# Patient Record
Sex: MALE | Race: WHITE | NOT HISPANIC OR LATINO | Employment: OTHER | URBAN - METROPOLITAN AREA
[De-identification: names, ages, dates, MRNs, and addresses within clinical notes are randomized per-mention and may not be internally consistent; named-entity substitution may affect disease eponyms.]

---

## 2019-12-29 ENCOUNTER — PATIENT MESSAGE (OUTPATIENT)
Dept: FAMILY MEDICINE | Facility: CLINIC | Age: 79
End: 2019-12-29

## 2019-12-29 DIAGNOSIS — I25.10 CORONARY ARTERY DISEASE INVOLVING NATIVE CORONARY ARTERY OF NATIVE HEART WITHOUT ANGINA PECTORIS: ICD-10-CM

## 2019-12-29 DIAGNOSIS — Z79.899 ENCOUNTER FOR LONG-TERM (CURRENT) USE OF OTHER MEDICATIONS: Primary | ICD-10-CM

## 2019-12-29 DIAGNOSIS — E78.49 OTHER HYPERLIPIDEMIA: ICD-10-CM

## 2020-01-07 LAB
ALBUMIN SERPL-MCNC: 4 G/DL (ref 3.6–5.1)
ALBUMIN/GLOB SERPL: 1.5 (CALC) (ref 1–2.5)
ALP SERPL-CCNC: 53 U/L (ref 40–115)
ALT SERPL-CCNC: 27 U/L (ref 9–46)
APPEARANCE UR: CLEAR
AST SERPL-CCNC: 32 U/L (ref 10–35)
BACTERIA #/AREA URNS HPF: NORMAL /HPF
BACTERIA UR CULT: NORMAL
BASOPHILS # BLD AUTO: 60 CELLS/UL (ref 0–200)
BASOPHILS NFR BLD AUTO: 0.9 %
BILIRUB SERPL-MCNC: 1.2 MG/DL (ref 0.2–1.2)
BILIRUB UR QL STRIP: NEGATIVE
BUN SERPL-MCNC: 17 MG/DL (ref 7–25)
BUN/CREAT SERPL: NORMAL (CALC) (ref 6–22)
CALCIUM SERPL-MCNC: 9.5 MG/DL (ref 8.6–10.3)
CHLORIDE SERPL-SCNC: 107 MMOL/L (ref 98–110)
CHOLEST SERPL-MCNC: 118 MG/DL
CHOLEST/HDLC SERPL: 2.8 (CALC)
CO2 SERPL-SCNC: 28 MMOL/L (ref 20–32)
COLOR UR: YELLOW
CREAT SERPL-MCNC: 1.06 MG/DL (ref 0.7–1.11)
EOSINOPHIL # BLD AUTO: 261 CELLS/UL (ref 15–500)
EOSINOPHIL NFR BLD AUTO: 3.9 %
ERYTHROCYTE [DISTWIDTH] IN BLOOD BY AUTOMATED COUNT: 12.5 % (ref 11–15)
GFRSERPLBLD MDRD-ARVRAT: 65 ML/MIN/1.73M2
GLOBULIN SER CALC-MCNC: 2.7 G/DL (CALC) (ref 1.9–3.7)
GLUCOSE SERPL-MCNC: 85 MG/DL (ref 65–99)
GLUCOSE UR QL STRIP: NEGATIVE
HCT VFR BLD AUTO: 37.8 % (ref 38.5–50)
HDLC SERPL-MCNC: 42 MG/DL
HGB BLD-MCNC: 12.9 G/DL (ref 13.2–17.1)
HGB UR QL STRIP: NEGATIVE
HYALINE CASTS #/AREA URNS LPF: NORMAL /LPF
KETONES UR QL STRIP: NEGATIVE
LDLC SERPL CALC-MCNC: 60 MG/DL (CALC)
LEUKOCYTE ESTERASE UR QL STRIP: NEGATIVE
LYMPHOCYTES # BLD AUTO: 1407 CELLS/UL (ref 850–3900)
LYMPHOCYTES NFR BLD AUTO: 21 %
MCH RBC QN AUTO: 32.8 PG (ref 27–33)
MCHC RBC AUTO-ENTMCNC: 34.1 G/DL (ref 32–36)
MCV RBC AUTO: 96.2 FL (ref 80–100)
MONOCYTES # BLD AUTO: 616 CELLS/UL (ref 200–950)
MONOCYTES NFR BLD AUTO: 9.2 %
NEUTROPHILS # BLD AUTO: 4355 CELLS/UL (ref 1500–7800)
NEUTROPHILS NFR BLD AUTO: 65 %
NITRITE UR QL STRIP: NEGATIVE
NONHDLC SERPL-MCNC: 76 MG/DL (CALC)
PH UR STRIP: NORMAL [PH] (ref 5–8)
PLATELET # BLD AUTO: 184 THOUSAND/UL (ref 140–400)
PMV BLD REES-ECKER: 11.2 FL (ref 7.5–12.5)
POTASSIUM SERPL-SCNC: 4.2 MMOL/L (ref 3.5–5.3)
PROT SERPL-MCNC: 6.7 G/DL (ref 6.1–8.1)
PROT UR QL STRIP: NEGATIVE
RBC # BLD AUTO: 3.93 MILLION/UL (ref 4.2–5.8)
RBC #/AREA URNS HPF: NORMAL /HPF
SODIUM SERPL-SCNC: 142 MMOL/L (ref 135–146)
SP GR UR STRIP: 1.02 (ref 1–1.03)
SQUAMOUS #/AREA URNS HPF: NORMAL /HPF
TRIGL SERPL-MCNC: 80 MG/DL
TSH SERPL-ACNC: 3.79 MIU/L (ref 0.4–4.5)
WBC # BLD AUTO: 6.7 THOUSAND/UL (ref 3.8–10.8)
WBC #/AREA URNS HPF: NORMAL /HPF

## 2020-01-15 ENCOUNTER — OFFICE VISIT (OUTPATIENT)
Dept: FAMILY MEDICINE | Facility: CLINIC | Age: 80
End: 2020-01-15
Payer: MEDICARE

## 2020-01-15 VITALS
BODY MASS INDEX: 21.19 KG/M2 | HEART RATE: 60 BPM | HEIGHT: 67 IN | DIASTOLIC BLOOD PRESSURE: 60 MMHG | SYSTOLIC BLOOD PRESSURE: 102 MMHG | WEIGHT: 135 LBS

## 2020-01-15 DIAGNOSIS — K21.9 GASTRO-ESOPHAGEAL REFLUX DISEASE WITHOUT ESOPHAGITIS: Primary | ICD-10-CM

## 2020-01-15 DIAGNOSIS — Z12.5 SPECIAL SCREENING FOR MALIGNANT NEOPLASM OF PROSTATE: ICD-10-CM

## 2020-01-15 DIAGNOSIS — F41.9 ANXIETY: ICD-10-CM

## 2020-01-15 DIAGNOSIS — M50.30 DDD (DEGENERATIVE DISC DISEASE), CERVICAL: ICD-10-CM

## 2020-01-15 DIAGNOSIS — M17.12 PRIMARY OSTEOARTHRITIS OF LEFT KNEE: ICD-10-CM

## 2020-01-15 DIAGNOSIS — H40.1131 PRIMARY OPEN ANGLE GLAUCOMA (POAG) OF BOTH EYES, MILD STAGE: ICD-10-CM

## 2020-01-15 DIAGNOSIS — H90.0 CONDUCTIVE HEARING LOSS, BILATERAL: ICD-10-CM

## 2020-01-15 DIAGNOSIS — N40.0 ENLARGED PROSTATE WITHOUT LOWER URINARY TRACT SYMPTOMS (LUTS): ICD-10-CM

## 2020-01-15 DIAGNOSIS — I25.10 ATHEROSCLEROSIS OF NATIVE CORONARY ARTERY OF NATIVE HEART WITHOUT ANGINA PECTORIS: ICD-10-CM

## 2020-01-15 DIAGNOSIS — E78.00 PURE HYPERCHOLESTEROLEMIA: ICD-10-CM

## 2020-01-15 DIAGNOSIS — Z95.5 H/O HEART ARTERY STENT: ICD-10-CM

## 2020-01-15 DIAGNOSIS — D50.0 IRON DEFICIENCY ANEMIA DUE TO CHRONIC BLOOD LOSS: ICD-10-CM

## 2020-01-15 PROCEDURE — 1159F MED LIST DOCD IN RCRD: CPT | Mod: S$GLB,,, | Performed by: FAMILY MEDICINE

## 2020-01-15 PROCEDURE — 99214 OFFICE O/P EST MOD 30 MIN: CPT | Mod: S$GLB,,, | Performed by: FAMILY MEDICINE

## 2020-01-15 PROCEDURE — 1159F PR MEDICATION LIST DOCUMENTED IN MEDICAL RECORD: ICD-10-PCS | Mod: S$GLB,,, | Performed by: FAMILY MEDICINE

## 2020-01-15 PROCEDURE — 99214 PR OFFICE/OUTPT VISIT, EST, LEVL IV, 30-39 MIN: ICD-10-PCS | Mod: S$GLB,,, | Performed by: FAMILY MEDICINE

## 2020-01-15 RX ORDER — PERPHENAZINE/AMITRIPTYLINE HCL 4MG-10MG
TABLET ORAL
COMMUNITY
End: 2021-04-08

## 2020-01-15 RX ORDER — BALOXAVIR MARBOXIL 20 MG/1
TABLET, FILM COATED ORAL
COMMUNITY
Start: 2019-11-29 | End: 2020-01-15

## 2020-01-15 RX ORDER — ASPIRIN 81 MG/1
81 TABLET ORAL DAILY
COMMUNITY

## 2020-01-15 RX ORDER — ALPRAZOLAM 0.25 MG/1
0.25 TABLET ORAL NIGHTLY PRN
COMMUNITY
Start: 2019-11-20 | End: 2020-07-08 | Stop reason: SDUPTHER

## 2020-01-15 RX ORDER — LATANOPROST 50 UG/ML
1 SOLUTION/ DROPS OPHTHALMIC NIGHTLY
COMMUNITY
Start: 2020-01-05

## 2020-01-15 RX ORDER — PANTOPRAZOLE SODIUM 40 MG/1
40 TABLET, DELAYED RELEASE ORAL DAILY
COMMUNITY
Start: 2019-11-20 | End: 2020-01-15 | Stop reason: SDUPTHER

## 2020-01-15 RX ORDER — EPINEPHRINE 0.22MG
100 AEROSOL WITH ADAPTER (ML) INHALATION DAILY
COMMUNITY
End: 2022-08-18

## 2020-01-15 RX ORDER — METOPROLOL SUCCINATE 25 MG/1
12.5 TABLET, EXTENDED RELEASE ORAL DAILY
COMMUNITY
End: 2020-10-08 | Stop reason: SDUPTHER

## 2020-01-15 RX ORDER — GLUCOSAMINE/CHONDRO SU A 500-400 MG
1 TABLET ORAL 3 TIMES DAILY
COMMUNITY
End: 2022-08-18

## 2020-01-15 RX ORDER — ATORVASTATIN CALCIUM 40 MG/1
40 TABLET, FILM COATED ORAL DAILY
COMMUNITY
Start: 2020-01-03 | End: 2021-03-15

## 2020-01-15 RX ORDER — CETIRIZINE HYDROCHLORIDE 10 MG/1
TABLET ORAL
COMMUNITY
Start: 2019-11-29 | End: 2022-08-18

## 2020-01-15 RX ORDER — PANTOPRAZOLE SODIUM 40 MG/1
40 TABLET, DELAYED RELEASE ORAL DAILY
Qty: 90 TABLET | Refills: 1 | Status: SHIPPED | OUTPATIENT
Start: 2020-01-15 | End: 2020-07-08 | Stop reason: SDUPTHER

## 2020-01-15 NOTE — PROGRESS NOTES
SUBJECTIVE:    Patient ID: Jarred Harrison is a 79 y.o. male.    Chief Complaint: Follow-up (bottles brought but only wants Pantoprazole. Gets other refills from another drBoubacar - )    This 79-year-old male has been active around the house doing chores and yd work.  He is not able exercises he has had a drop toe surgery on the right foot and at 2006 that impairs his walking.    He was seen at urgent care in the month of December for in influenza and flu with fever.  He had the flu vaccine prior.  Today says I feel good.    Cardiologist is Dr. Galen Taylor he has reduce the patient's metoprolol down to 12.5 mg daily and discontinued his lisinopril.    Ophthalmologist is  who states patient glaucoma is doing very well.    Gastroenterologist Dr. neal has done a EGD with dilation for patient's dysphagia in 2018.  Patient is swallowing well and eating well      Patient Message on 12/29/2019   Component Date Value Ref Range Status    WBC 01/06/2020 6.7  3.8 - 10.8 Thousand/uL Final    RBC 01/06/2020 3.93* 4.20 - 5.80 Million/uL Final    Hemoglobin 01/06/2020 12.9* 13.2 - 17.1 g/dL Final    Hematocrit 01/06/2020 37.8* 38.5 - 50.0 % Final    Mean Corpuscular Volume 01/06/2020 96.2  80.0 - 100.0 fL Final    Mean Corpuscular Hemoglobin 01/06/2020 32.8  27.0 - 33.0 pg Final    Mean Corpuscular Hemoglobin Conc 01/06/2020 34.1  32.0 - 36.0 g/dL Final    RDW 01/06/2020 12.5  11.0 - 15.0 % Final    Platelets 01/06/2020 184  140 - 400 Thousand/uL Final    MPV 01/06/2020 11.2  7.5 - 12.5 fL Final    Neutrophils Absolute 01/06/2020 4,355  1,500 - 7,800 cells/uL Final    Lymph # 01/06/2020 1,407  850 - 3,900 cells/uL Final    Mono # 01/06/2020 616  200 - 950 cells/uL Final    Eos # 01/06/2020 261  15 - 500 cells/uL Final    Baso # 01/06/2020 60  0 - 200 cells/uL Final    Neutrophils Relative 01/06/2020 65  % Final    Lymph% 01/06/2020 21.0  % Final    Mono% 01/06/2020 9.2  % Final     Eosinophil% 01/06/2020 3.9  % Final    Basophil% 01/06/2020 0.9  % Final    Glucose 01/06/2020 85  65 - 99 mg/dL Final    BUN, Bld 01/06/2020 17  7 - 25 mg/dL Final    Creatinine 01/06/2020 1.06  0.70 - 1.11 mg/dL Final    eGFR if non African American 01/06/2020 65  > OR = 60 mL/min/1.73m2 Final    eGFR if African American 01/06/2020 76  > OR = 60 mL/min/1.73m2 Final    BUN/Creatinine Ratio 01/06/2020 NOT APPLICABLE  6 - 22 (calc) Final    Sodium 01/06/2020 142  135 - 146 mmol/L Final    Potassium 01/06/2020 4.2  3.5 - 5.3 mmol/L Final    Chloride 01/06/2020 107  98 - 110 mmol/L Final    CO2 01/06/2020 28  20 - 32 mmol/L Final    Calcium 01/06/2020 9.5  8.6 - 10.3 mg/dL Final    Total Protein 01/06/2020 6.7  6.1 - 8.1 g/dL Final    Albumin 01/06/2020 4.0  3.6 - 5.1 g/dL Final    Globulin, Total 01/06/2020 2.7  1.9 - 3.7 g/dL (calc) Final    Albumin/Globulin Ratio 01/06/2020 1.5  1.0 - 2.5 (calc) Final    Total Bilirubin 01/06/2020 1.2  0.2 - 1.2 mg/dL Final    Alkaline Phosphatase 01/06/2020 53  40 - 115 U/L Final    AST 01/06/2020 32  10 - 35 U/L Final    ALT 01/06/2020 27  9 - 46 U/L Final    Cholesterol 01/06/2020 118  <200 mg/dL Final    HDL 01/06/2020 42  >40 mg/dL Final    Triglycerides 01/06/2020 80  <150 mg/dL Final    LDL Cholesterol 01/06/2020 60  mg/dL (calc) Final    Hdl/Cholesterol Ratio 01/06/2020 2.8  <5.0 (calc) Final    Non HDL Chol. (LDL+VLDL) 01/06/2020 76  <130 mg/dL (calc) Final    TSH 01/06/2020 3.79  0.40 - 4.50 mIU/L Final    Color, UA 01/06/2020 YELLOW  YELLOW Final    Appearance, UA 01/06/2020 CLEAR  CLEAR Final    Specific Gravity, UA 01/06/2020 1.016  1.001 - 1.035 Final    pH, UA 01/06/2020 < OR = 5.0  5.0 - 8.0 Final    Glucose, UA 01/06/2020 NEGATIVE  NEGATIVE Final    Bilirubin, UA 01/06/2020 NEGATIVE  NEGATIVE Final    Ketones, UA 01/06/2020 NEGATIVE  NEGATIVE Final    Occult Blood UA 01/06/2020 NEGATIVE  NEGATIVE Final    Protein, UA 01/06/2020  NEGATIVE  NEGATIVE Final    Nitrite, UA 01/06/2020 NEGATIVE  NEGATIVE Final    Leukocytes, UA 01/06/2020 NEGATIVE  NEGATIVE Final    WBC Casts, UA 01/06/2020 NONE SEEN  < OR = 5 /HPF Final    RBC Casts, UA 01/06/2020 0-2  < OR = 2 /HPF Final    Squam Epithel, UA 01/06/2020 NONE SEEN  < OR = 5 /HPF Final    Bacteria, UA 01/06/2020 NONE SEEN  NONE SEEN /HPF Final    Hyaline Casts, UA 01/06/2020 NONE SEEN  NONE SEEN /LPF Final    Reflexive Urine Culture 01/06/2020 NO CULTURE INDICATED   Final       No past medical history on file.  No past surgical history on file.  No family history on file.    Marital Status:   Alcohol History:  has no alcohol history on file.  Tobacco History:  reports that he has never smoked. He has never used smokeless tobacco.  Drug History:  has no drug history on file.    Review of patient's allergies indicates:   Allergen Reactions    Hydrocodone        Current Outpatient Medications:     ALPRAZolam (XANAX) 0.25 MG tablet, Take 0.25 mg by mouth nightly as needed. , Disp: , Rfl:     aspirin (ECOTRIN) 81 MG EC tablet, Take 81 mg by mouth once daily., Disp: , Rfl:     atorvastatin (LIPITOR) 40 MG tablet, Take 40 mg by mouth once daily. , Disp: , Rfl:     coconut oil 1,000 mg Cap, Take by mouth., Disp: , Rfl:     coenzyme Q10 (CO Q-10) 100 mg capsule, Take 100 mg by mouth once daily., Disp: , Rfl:     glucosamine-chondroitin 500-400 mg tablet, Take 1 tablet by mouth 3 (three) times daily., Disp: , Rfl:     latanoprost 0.005 % ophthalmic solution, , Disp: , Rfl:     metoprolol succinate (TOPROL-XL) 25 MG 24 hr tablet, Take 12.5 mg by mouth once daily., Disp: , Rfl:     pantoprazole (PROTONIX) 40 MG tablet, Take 40 mg by mouth once daily. , Disp: , Rfl:     cetirizine (ZYRTEC) 10 MG tablet, TK 1 T PO D PRF ALLERGIES/SINUS COG, Disp: , Rfl:     Review of Systems   Constitutional: Negative for appetite change, chills, fatigue, fever and unexpected weight change.   HENT:  "Negative for congestion, ear pain and trouble swallowing.    Eyes: Negative for pain, discharge and visual disturbance.   Respiratory: Negative for apnea, cough, shortness of breath and wheezing.    Cardiovascular: Negative for chest pain and leg swelling.   Gastrointestinal: Negative for abdominal pain, blood in stool, constipation, diarrhea, nausea and vomiting.        Dysphagia resolved  after EGD w/ dilation , hiatal  Hernia, pantoprazole helps   Endocrine: Negative for cold intolerance, heat intolerance and polydipsia.   Genitourinary: Negative for dysuria, hematuria, testicular pain and urgency.   Musculoskeletal: Positive for arthralgias (wears brace on left knee, rt hand pains , ). Negative for gait problem, joint swelling and myalgias.   Neurological: Negative for dizziness, seizures and numbness.   Psychiatric/Behavioral: Positive for sleep disturbance (wakes up and  worries about family issues). Negative for agitation, behavioral problems and hallucinations. The patient is not nervous/anxious.           Objective:      Vitals:    01/15/20 0925   BP: 102/60   Pulse: 60   Weight: 61.2 kg (135 lb)   Height: 5' 7.25" (1.708 m)     Body mass index is 20.99 kg/m².  Physical Exam   Constitutional: He is oriented to person, place, and time. He appears well-developed and well-nourished.   Has female in no apparent distress   HENT:   Head: Normocephalic and atraumatic.   Right Ear: External ear normal.   Left Ear: External ear normal.   Nose: Nose normal.   Mouth/Throat: Oropharynx is clear and moist.   Wears bilateral hearing aids   Eyes: Pupils are equal, round, and reactive to light. EOM are normal.   Neck: Normal range of motion. Neck supple. Carotid bruit is not present. No thyromegaly present.   Cardiovascular: Normal rate, regular rhythm, normal heart sounds and intact distal pulses.   No murmur heard.  Pulmonary/Chest: Effort normal and breath sounds normal. He has no wheezes. He has no rales.   Abdominal: " Soft. Bowel sounds are normal. He exhibits no distension. There is no hepatosplenomegaly. There is no tenderness.   Musculoskeletal: Normal range of motion. He exhibits no tenderness or deformity.        Lumbar back: Normal. He exhibits no pain and no spasm.   Bends 90 degrees at  waist shoulders have full range of motion, knees have full range of motion with no crepitance.  He walks with a normal gait   Lymphadenopathy:     He has no cervical adenopathy.   Neurological: He is alert and oriented to person, place, and time. No cranial nerve deficit. Coordination normal.   Skin: Skin is warm and dry. No rash noted.   Psychiatric: He has a normal mood and affect. His behavior is normal. Judgment and thought content normal.   Nursing note and vitals reviewed.        Assessment:       1. Gastro-esophageal reflux disease without esophagitis    2. DDD (degenerative disc disease), cervical    3. Anxiety    4. Primary open angle glaucoma (POAG) of both eyes, mild stage    5. Conductive hearing loss, bilateral    6. Pure hypercholesterolemia    7. H/O heart artery stent    8. Atherosclerosis of native coronary artery of native heart without angina pectoris    9. Enlarged prostate without lower urinary tract symptoms (luts)    10. Iron deficiency anemia due to chronic blood loss    11. Primary osteoarthritis of left knee         Plan:       Gastro-esophageal reflux disease without esophagitis  Patient needs refill on pantoprazole.  He has no GI complaints at this time  DDD (degenerative disc disease), cervical    Anxiety  Patient has anxiety at nightly causes some insomnia  Primary open angle glaucoma (POAG) of both eyes, mild stage  Controlled well with current eye drops  Conductive hearing loss, bilateral  He wears bilateral hearing aids  Pure hypercholesterolemia  Cholesterol is at goal with current regimen  H/O heart artery stent  Followed by Cardiology q.6 months  Atherosclerosis of native coronary artery of native heart  without angina pectoris    Enlarged prostate without lower urinary tract symptoms (luts)  No longer sees any urologist, we will do a PSA in 6 months  Iron deficiency anemia due to chronic blood loss  Hematocrit stable at 37.8 hands is this is normal chromic normocytic indices with no response to iron therapy in the past  Primary osteoarthritis of left knee  Continue to wear knee brace as needed    No follow-ups on file.

## 2020-07-02 ENCOUNTER — PES CALL (OUTPATIENT)
Dept: ADMINISTRATIVE | Facility: CLINIC | Age: 80
End: 2020-07-02

## 2020-07-02 ENCOUNTER — PATIENT MESSAGE (OUTPATIENT)
Dept: FAMILY MEDICINE | Facility: CLINIC | Age: 80
End: 2020-07-02

## 2020-07-08 ENCOUNTER — TELEPHONE (OUTPATIENT)
Dept: FAMILY MEDICINE | Facility: CLINIC | Age: 80
End: 2020-07-08

## 2020-07-10 ENCOUNTER — TELEPHONE (OUTPATIENT)
Dept: FAMILY MEDICINE | Facility: CLINIC | Age: 80
End: 2020-07-10

## 2020-07-10 LAB
ALBUMIN SERPL-MCNC: 4.2 G/DL (ref 3.6–5.1)
ALBUMIN/GLOB SERPL: 1.6 (CALC) (ref 1–2.5)
ALP SERPL-CCNC: 45 U/L (ref 35–144)
ALT SERPL-CCNC: 22 U/L (ref 9–46)
AST SERPL-CCNC: 30 U/L (ref 10–35)
BASOPHILS # BLD AUTO: 62 CELLS/UL (ref 0–200)
BASOPHILS NFR BLD AUTO: 0.8 %
BILIRUB SERPL-MCNC: 1.2 MG/DL (ref 0.2–1.2)
BUN SERPL-MCNC: 19 MG/DL (ref 7–25)
BUN/CREAT SERPL: NORMAL (CALC) (ref 6–22)
CALCIUM SERPL-MCNC: 9.9 MG/DL (ref 8.6–10.3)
CHLORIDE SERPL-SCNC: 109 MMOL/L (ref 98–110)
CHOLEST SERPL-MCNC: 117 MG/DL
CHOLEST/HDLC SERPL: 2.5 (CALC)
CO2 SERPL-SCNC: 27 MMOL/L (ref 20–32)
CREAT SERPL-MCNC: 0.96 MG/DL (ref 0.7–1.11)
EOSINOPHIL # BLD AUTO: 250 CELLS/UL (ref 15–500)
EOSINOPHIL NFR BLD AUTO: 3.2 %
ERYTHROCYTE [DISTWIDTH] IN BLOOD BY AUTOMATED COUNT: 12.3 % (ref 11–15)
FERRITIN SERPL-MCNC: 171 NG/ML (ref 24–380)
GFRSERPLBLD MDRD-ARVRAT: 73 ML/MIN/1.73M2
GLOBULIN SER CALC-MCNC: 2.7 G/DL (CALC) (ref 1.9–3.7)
GLUCOSE SERPL-MCNC: 97 MG/DL (ref 65–99)
HCT VFR BLD AUTO: 39.2 % (ref 38.5–50)
HDLC SERPL-MCNC: 47 MG/DL
HGB BLD-MCNC: 13 G/DL (ref 13.2–17.1)
LDLC SERPL CALC-MCNC: 56 MG/DL (CALC)
LYMPHOCYTES # BLD AUTO: 1412 CELLS/UL (ref 850–3900)
LYMPHOCYTES NFR BLD AUTO: 18.1 %
MCH RBC QN AUTO: 32.1 PG (ref 27–33)
MCHC RBC AUTO-ENTMCNC: 33.2 G/DL (ref 32–36)
MCV RBC AUTO: 96.8 FL (ref 80–100)
MONOCYTES # BLD AUTO: 593 CELLS/UL (ref 200–950)
MONOCYTES NFR BLD AUTO: 7.6 %
NEUTROPHILS # BLD AUTO: 5483 CELLS/UL (ref 1500–7800)
NEUTROPHILS NFR BLD AUTO: 70.3 %
NONHDLC SERPL-MCNC: 70 MG/DL (CALC)
PLATELET # BLD AUTO: 169 THOUSAND/UL (ref 140–400)
PMV BLD REES-ECKER: 11 FL (ref 7.5–12.5)
POTASSIUM SERPL-SCNC: 4.5 MMOL/L (ref 3.5–5.3)
PROT SERPL-MCNC: 6.9 G/DL (ref 6.1–8.1)
PSA SERPL-MCNC: 2.8 NG/ML
RBC # BLD AUTO: 4.05 MILLION/UL (ref 4.2–5.8)
SODIUM SERPL-SCNC: 143 MMOL/L (ref 135–146)
TRIGL SERPL-MCNC: 63 MG/DL
WBC # BLD AUTO: 7.8 THOUSAND/UL (ref 3.8–10.8)

## 2020-07-15 ENCOUNTER — OFFICE VISIT (OUTPATIENT)
Dept: FAMILY MEDICINE | Facility: CLINIC | Age: 80
End: 2020-07-15
Payer: MEDICARE

## 2020-07-15 DIAGNOSIS — M54.12 CERVICAL RADICULOPATHY: ICD-10-CM

## 2020-07-15 DIAGNOSIS — M17.12 PRIMARY OSTEOARTHRITIS OF LEFT KNEE: ICD-10-CM

## 2020-07-15 DIAGNOSIS — I49.5 SICK SINUS SYNDROME: ICD-10-CM

## 2020-07-15 DIAGNOSIS — N40.0 BENIGN PROSTATIC HYPERPLASIA WITHOUT LOWER URINARY TRACT SYMPTOMS: ICD-10-CM

## 2020-07-15 DIAGNOSIS — E78.2 MIXED HYPERLIPIDEMIA: ICD-10-CM

## 2020-07-15 DIAGNOSIS — F41.9 ANXIETY: ICD-10-CM

## 2020-07-15 DIAGNOSIS — K21.9 GASTRO-ESOPHAGEAL REFLUX DISEASE WITHOUT ESOPHAGITIS: ICD-10-CM

## 2020-07-15 DIAGNOSIS — H90.0 CONDUCTIVE HEARING LOSS, BILATERAL: ICD-10-CM

## 2020-07-15 DIAGNOSIS — I25.10 ATHEROSCLEROSIS OF NATIVE CORONARY ARTERY OF NATIVE HEART WITHOUT ANGINA PECTORIS: Primary | ICD-10-CM

## 2020-07-15 DIAGNOSIS — Z95.5 H/O HEART ARTERY STENT: ICD-10-CM

## 2020-07-15 DIAGNOSIS — E78.00 PURE HYPERCHOLESTEROLEMIA: ICD-10-CM

## 2020-07-15 DIAGNOSIS — I20.89 OTHER FORMS OF ANGINA PECTORIS: ICD-10-CM

## 2020-07-15 DIAGNOSIS — M50.30 DDD (DEGENERATIVE DISC DISEASE), CERVICAL: ICD-10-CM

## 2020-07-15 DIAGNOSIS — H40.1131 PRIMARY OPEN ANGLE GLAUCOMA (POAG) OF BOTH EYES, MILD STAGE: ICD-10-CM

## 2020-07-15 PROCEDURE — 99442 PR PHYSICIAN TELEPHONE EVALUATION 11-20 MIN: CPT | Mod: 95,,, | Performed by: FAMILY MEDICINE

## 2020-07-15 PROCEDURE — 99442 PR PHYSICIAN TELEPHONE EVALUATION 11-20 MIN: ICD-10-PCS | Mod: 95,,, | Performed by: FAMILY MEDICINE

## 2020-07-15 RX ORDER — ALPRAZOLAM 0.25 MG/1
0.25 TABLET ORAL NIGHTLY PRN
Qty: 90 TABLET | Refills: 1 | Status: SHIPPED | OUTPATIENT
Start: 2020-07-15 | End: 2021-01-18

## 2020-07-15 RX ORDER — PANTOPRAZOLE SODIUM 40 MG/1
40 TABLET, DELAYED RELEASE ORAL DAILY
Qty: 90 TABLET | Refills: 1 | Status: SHIPPED | OUTPATIENT
Start: 2020-07-15 | End: 2020-12-07 | Stop reason: SDUPTHER

## 2020-07-15 NOTE — PROGRESS NOTES
Subjective:        The chief complaint leading to consultation is:  Hypertension  The patient location is:  Home  Visit type: Virtual visit with synchronous audio/video or audio only  This was a phone conversation in lieu of in-person visit due to the coronavirus emergency. Patient acknowledged and agreed to the telephone encounter.     This is a telemedicine visit for an 80-year-old male.  He has been isolating at home due to the COVID virus crisis.  He stays active outdoor by mowing the lawn and weed eating.  His walking is fine has no limitations.  He has had no recent falls.    He sees Dr. Galen De La Torre cardiology.  He has some coronary stents ; followed by the cardiologist.  He denies any angina or shortness of breath .      No past surgical history on file.  No past medical history on file.  No family history on file.     Social History:   Marital Status:   Alcohol History:  has no history on file for alcohol.  Tobacco History:  reports that he has never smoked. He has never used smokeless tobacco.  Drug History:  has no history on file for drug.    Review of patient's allergies indicates:   Allergen Reactions    Hydrocodone        Current Outpatient Medications   Medication Sig Dispense Refill    ALPRAZolam (XANAX) 0.25 MG tablet Take 1 tablet (0.25 mg total) by mouth nightly as needed. 90 tablet 1    aspirin (ECOTRIN) 81 MG EC tablet Take 81 mg by mouth once daily.      atorvastatin (LIPITOR) 40 MG tablet Take 40 mg by mouth once daily.       cetirizine (ZYRTEC) 10 MG tablet TK 1 T PO D PRF ALLERGIES/SINUS COG      coconut oil 1,000 mg Cap Take by mouth.      coenzyme Q10 (CO Q-10) 100 mg capsule Take 100 mg by mouth once daily.      glucosamine-chondroitin 500-400 mg tablet Take 1 tablet by mouth 3 (three) times daily.      latanoprost 0.005 % ophthalmic solution       metoprolol succinate (TOPROL-XL) 25 MG 24 hr tablet Take 12.5 mg by mouth once daily.      pantoprazole (PROTONIX)  40 MG tablet Take 1 tablet (40 mg total) by mouth once daily. 90 tablet 1     No current facility-administered medications for this visit.        Review of Systems   Constitutional: Negative for appetite change, chills, fatigue, fever and unexpected weight change.   HENT: Negative for congestion, ear pain and trouble swallowing.    Eyes: Negative for pain, discharge and visual disturbance.   Respiratory: Negative for apnea, cough, shortness of breath and wheezing.    Cardiovascular: Negative for chest pain and leg swelling.   Gastrointestinal: Negative for abdominal pain, blood in stool, constipation, diarrhea, nausea and vomiting.        Pantoprazole q am ,    Endocrine: Negative for cold intolerance, heat intolerance and polydipsia.   Genitourinary: Negative for dysuria, hematuria, testicular pain and urgency.        Nocturia 2-3 x night   Musculoskeletal: Positive for back pain (only hurts if I overdo the yardwork) and neck pain. Negative for gait problem, joint swelling and myalgias.   Neurological: Negative for dizziness, seizures and numbness.   Psychiatric/Behavioral: Positive for sleep disturbance (benadryl 25  mg hs / xanax also used). Negative for agitation, behavioral problems and hallucinations. The patient is not nervous/anxious.          Objective:        Physical Exam:   Physical Exam         Assessment:       1. Atherosclerosis of native coronary artery of native heart without angina pectoris    2. Gastro-esophageal reflux disease without esophagitis    3. Anxiety    4. Mixed hyperlipidemia    5. Other forms of angina pectoris    6. Sick sinus syndrome    7. Cervical radiculopathy    8. DDD (degenerative disc disease), cervical    9. Primary open angle glaucoma (POAG) of both eyes, mild stage    10. Conductive hearing loss, bilateral    11. H/O heart artery stent    12. Pure hypercholesterolemia    13. Benign prostatic hyperplasia without lower urinary tract symptoms    14. Primary osteoarthritis of  left knee      Plan:   Atherosclerosis of native coronary artery of native heart without angina pectoris  Patient coronary status seems stable.  He is asymptomatic.  Follow-up with Dr. Galen De La Torre in October  Gastro-esophageal reflux disease without esophagitis  -     pantoprazole (PROTONIX) 40 MG tablet; Take 1 tablet (40 mg total) by mouth once daily.  Dispense: 90 tablet; Refill: 1  Refill pantoprazole  Anxiety  -     ALPRAZolam (XANAX) 0.25 MG tablet; Take 1 tablet (0.25 mg total) by mouth nightly as needed.  Dispense: 90 tablet; Refill: 1  Xanax as needed  Mixed hyperlipidemia  -     CBC auto differential; Future; Expected date: 07/15/2020  -     Lipid Panel; Future; Expected date: 07/15/2020  -     Comprehensive metabolic panel; Future; Expected date: 07/15/2020  Last cholesterol is 117, needs a new batch of labs this summer  Other forms of angina pectoris    Sick sinus syndrome    Cervical radiculopathy    DDD (degenerative disc disease), cervical    Primary open angle glaucoma (POAG) of both eyes, mild stage    Conductive hearing loss, bilateral    H/O heart artery stent    Pure hypercholesterolemia    Benign prostatic hyperplasia without lower urinary tract symptoms    Primary osteoarthritis of left knee      No follow-ups on file.    Total time spent with patient:  15 min    Each patient to whom he or she provides medical services by telemedicine is:  (1) informed of the relationship between the physician and patient and the respective role of any other health care provider with respect to management of the patient; and (2) notified that he or she may decline to receive medical services by telemedicine and may withdraw from such care at any time.    This note was created using [employer] voice recognition software that occasionally misinterprets phrases or words. Established Patient - Audio Only Telehealth Visit                             This service was not originating from a related E/M service  provided within the previous 7 days nor will  to an E/M service or procedure within the next 24 hours or my soonest available appointment.  Prevailing standard of care was able to be met in this audio-only visit.

## 2020-07-19 VITALS
DIASTOLIC BLOOD PRESSURE: 64 MMHG | WEIGHT: 135 LBS | HEART RATE: 67 BPM | BODY MASS INDEX: 20.99 KG/M2 | SYSTOLIC BLOOD PRESSURE: 119 MMHG

## 2020-07-19 PROBLEM — I20.89 OTHER FORMS OF ANGINA PECTORIS: Status: ACTIVE | Noted: 2020-07-19

## 2020-07-19 PROBLEM — I49.5 SICK SINUS SYNDROME: Status: ACTIVE | Noted: 2020-07-19

## 2020-09-03 ENCOUNTER — PES CALL (OUTPATIENT)
Dept: ADMINISTRATIVE | Facility: CLINIC | Age: 80
End: 2020-09-03

## 2020-09-25 DIAGNOSIS — F41.9 ANXIETY: Primary | ICD-10-CM

## 2020-09-25 NOTE — TELEPHONE ENCOUNTER
Called pt's daughter. Advised I will gice Dr Waldron her message but it will be Monday when I will have an answer.    Printed.

## 2020-09-25 NOTE — TELEPHONE ENCOUNTER
----- Message from Marianna Melvin sent at 9/25/2020 11:07 AM CDT -----  Pt's daughter is calling she is asking if her father can try Lexapro for anxiety while dealing with his wifes health issues.  Daughter stated he has tried/failed Xanax in the past and she herself takes Lexapro with great results.   [facility] on [address] daughter Snehal @  [phone]

## 2020-09-25 NOTE — TELEPHONE ENCOUNTER
I really do not know this patient well. I do not anticipate an issue with starting a low dose Lexapro but may want to hold for Dr. Waldron on Monday.

## 2020-09-28 RX ORDER — ESCITALOPRAM OXALATE 5 MG/1
5 TABLET ORAL DAILY
COMMUNITY
End: 2020-09-28 | Stop reason: SDUPTHER

## 2020-09-28 RX ORDER — ESCITALOPRAM OXALATE 5 MG/1
5 TABLET ORAL DAILY
Qty: 30 TABLET | Refills: 2 | Status: SHIPPED | OUTPATIENT
Start: 2020-09-28 | End: 2021-04-08

## 2020-09-28 NOTE — TELEPHONE ENCOUNTER
Spoke with pt daughter, per Dr. Waldron ok to add Lexapro 5mg q am. Snehal said okay thank you so much

## 2020-10-08 ENCOUNTER — OFFICE VISIT (OUTPATIENT)
Dept: CARDIOLOGY | Facility: CLINIC | Age: 80
End: 2020-10-08
Payer: MEDICARE

## 2020-10-08 VITALS
HEART RATE: 56 BPM | DIASTOLIC BLOOD PRESSURE: 60 MMHG | BODY MASS INDEX: 19.93 KG/M2 | SYSTOLIC BLOOD PRESSURE: 120 MMHG | WEIGHT: 127 LBS | HEIGHT: 67 IN | TEMPERATURE: 97 F | OXYGEN SATURATION: 98 %

## 2020-10-08 DIAGNOSIS — I25.10 ATHEROSCLEROSIS OF NATIVE CORONARY ARTERY OF NATIVE HEART WITHOUT ANGINA PECTORIS: ICD-10-CM

## 2020-10-08 DIAGNOSIS — E78.2 MIXED HYPERLIPIDEMIA: ICD-10-CM

## 2020-10-08 DIAGNOSIS — Z95.5 H/O HEART ARTERY STENT: Primary | ICD-10-CM

## 2020-10-08 PROCEDURE — 99212 OFFICE O/P EST SF 10 MIN: CPT | Mod: S$GLB,,, | Performed by: INTERNAL MEDICINE

## 2020-10-08 PROCEDURE — 99212 PR OFFICE/OUTPT VISIT, EST, LEVL II, 10-19 MIN: ICD-10-PCS | Mod: S$GLB,,, | Performed by: INTERNAL MEDICINE

## 2020-10-08 RX ORDER — METOPROLOL SUCCINATE 25 MG/1
12.5 TABLET, EXTENDED RELEASE ORAL DAILY
Qty: 45 TABLET | Refills: 3 | Status: SHIPPED | OUTPATIENT
Start: 2020-10-08 | End: 2021-08-17 | Stop reason: SDUPTHER

## 2020-10-13 ENCOUNTER — HOSPITAL ENCOUNTER (OUTPATIENT)
Dept: RADIOLOGY | Facility: HOSPITAL | Age: 80
Discharge: HOME | End: 2020-10-13
Attending: PHYSICAL MEDICINE & REHABILITATION
Payer: MEDICARE

## 2020-10-13 ENCOUNTER — TELEPHONE (OUTPATIENT)
Dept: SPINE | Facility: CLINIC | Age: 80
End: 2020-10-13

## 2020-10-13 ENCOUNTER — OFFICE VISIT (OUTPATIENT)
Dept: SPINE | Facility: CLINIC | Age: 80
End: 2020-10-13
Payer: MEDICARE

## 2020-10-13 VITALS — BODY MASS INDEX: 19.93 KG/M2 | WEIGHT: 127 LBS | HEIGHT: 67 IN

## 2020-10-13 DIAGNOSIS — M54.9 DORSALGIA, UNSPECIFIED: ICD-10-CM

## 2020-10-13 DIAGNOSIS — M54.50 ACUTE LEFT-SIDED LOW BACK PAIN WITHOUT SCIATICA: Primary | ICD-10-CM

## 2020-10-13 PROCEDURE — 96372 PR INJECTION,THERAP/PROPH/DIAG2ST, IM OR SUBCUT: ICD-10-PCS | Mod: S$GLB,,, | Performed by: PHYSICAL MEDICINE & REHABILITATION

## 2020-10-13 PROCEDURE — 72110 X-RAY EXAM L-2 SPINE 4/>VWS: CPT | Mod: 26,,, | Performed by: RADIOLOGY

## 2020-10-13 PROCEDURE — 72110 XR LUMBAR SPINE 5 VIEW WITH FLEX AND EXT: ICD-10-PCS | Mod: 26,,, | Performed by: RADIOLOGY

## 2020-10-13 PROCEDURE — 96372 THER/PROPH/DIAG INJ SC/IM: CPT | Mod: S$GLB,,, | Performed by: PHYSICAL MEDICINE & REHABILITATION

## 2020-10-13 PROCEDURE — 99204 OFFICE O/P NEW MOD 45 MIN: CPT | Mod: 25,S$GLB,, | Performed by: PHYSICAL MEDICINE & REHABILITATION

## 2020-10-13 PROCEDURE — 72110 X-RAY EXAM L-2 SPINE 4/>VWS: CPT | Mod: TC,FY

## 2020-10-13 PROCEDURE — 99204 PR OFFICE/OUTPT VISIT, NEW, LEVL IV, 45-59 MIN: ICD-10-PCS | Mod: 25,S$GLB,, | Performed by: PHYSICAL MEDICINE & REHABILITATION

## 2020-10-13 RX ORDER — KETOROLAC TROMETHAMINE 30 MG/ML
30 INJECTION, SOLUTION INTRAMUSCULAR; INTRAVENOUS ONCE
Status: COMPLETED | OUTPATIENT
Start: 2020-10-13 | End: 2020-10-13

## 2020-10-13 RX ORDER — METHYLPREDNISOLONE 4 MG/1
TABLET ORAL
Qty: 1 PACKAGE | Refills: 0 | Status: SHIPPED | OUTPATIENT
Start: 2020-10-13 | End: 2020-11-03

## 2020-10-13 RX ORDER — METHOCARBAMOL 500 MG/1
500 TABLET, FILM COATED ORAL 2 TIMES DAILY PRN
Qty: 30 TABLET | Refills: 0 | Status: SHIPPED | OUTPATIENT
Start: 2020-10-13 | End: 2020-10-23

## 2020-10-13 RX ORDER — METHYLPREDNISOLONE ACETATE 40 MG/ML
40 INJECTION, SUSPENSION INTRA-ARTICULAR; INTRALESIONAL; INTRAMUSCULAR; SOFT TISSUE ONCE
Status: COMPLETED | OUTPATIENT
Start: 2020-10-13 | End: 2020-10-13

## 2020-10-13 RX ADMIN — KETOROLAC TROMETHAMINE 30 MG: 30 INJECTION, SOLUTION INTRAMUSCULAR; INTRAVENOUS at 09:10

## 2020-10-13 RX ADMIN — METHYLPREDNISOLONE ACETATE 40 MG: 40 INJECTION, SUSPENSION INTRA-ARTICULAR; INTRALESIONAL; INTRAMUSCULAR; SOFT TISSUE at 09:10

## 2020-10-13 NOTE — PROGRESS NOTES
SUBJECTIVE:    Patient ID: Jarred Harrison is a 80 y.o. male.    Chief Complaint: Back Pain    This is an 80-year-old man who sees Dr. Waldron for his primary care.  History of hyperlipidemia and coronary artery disease status post stent only on low-dose aspirin.  Dr. De La Torre is his cardiologist.  Long history of intermittent left-sided low back pain but nothing severe.  Never been worked up.  Presents with several weeks history of left-sided low back pain at the lumbosacral junction.  No specific injury.  Similar to previous episodes but more severe.  No persistent leg pain.  No weakness.  No bowel or bladder dysfunction fever chills sweats or unexpected weight loss.  Pain level 8 or 9/10.  Try Tylenol with modest improvement.  Flexeril made him sleepy.  He is avoiding nonsteroidal anti-inflammatory drugs due to his history of coronary disease        Past Medical History:   Diagnosis Date    AI (aortic insufficiency)     Coronary artery disease     GERD (gastroesophageal reflux disease)     Hyperlipidemia     LBBB (left bundle branch block)      Social History     Socioeconomic History    Marital status:      Spouse name: Not on file    Number of children: Not on file    Years of education: Not on file    Highest education level: Not on file   Occupational History    Not on file   Social Needs    Financial resource strain: Not on file    Food insecurity     Worry: Not on file     Inability: Not on file    Transportation needs     Medical: Not on file     Non-medical: Not on file   Tobacco Use    Smoking status: Never Smoker    Smokeless tobacco: Never Used   Substance and Sexual Activity    Alcohol use: Not Currently    Drug use: Not on file    Sexual activity: Not on file   Lifestyle    Physical activity     Days per week: Not on file     Minutes per session: Not on file    Stress: Not on file   Relationships    Social connections     Talks on phone: Not on file     Gets together: Not  "on file     Attends [employer] service: Not on file     Active member of club or organization: Not on file     Attends meetings of clubs or organizations: Not on file     Relationship status: Not on file   Other Topics Concern    Not on file   Social History Narrative    Not on file     Past Surgical History:   Procedure Laterality Date    CARDIAC CATHETERIZATION      CATARACT EXTRACTION      CORONARY ANGIOPLASTY  08/29/82015    FOOT SURGERY Right     INSERT / REPLACE / REMOVE PACEMAKER      KNEE SURGERY Left     right elbow       History reviewed. No pertinent family history.  Vitals:    10/13/20 0905   Weight: 57.6 kg (126 lb 15.8 oz)   Height: 5' 7" (1.702 m)       Review of Systems   Constitutional: Negative for chills, diaphoresis, fatigue, fever and unexpected weight change.   HENT: Negative for trouble swallowing.    Eyes: Negative for visual disturbance.   Respiratory: Negative for shortness of breath.    Cardiovascular: Negative for chest pain.   Gastrointestinal: Negative for abdominal pain, constipation, diarrhea, nausea and vomiting.   Genitourinary: Negative for difficulty urinating.   Musculoskeletal: Negative for arthralgias, back pain, gait problem, joint swelling, myalgias, neck pain and neck stiffness.   Neurological: Negative for dizziness, speech difficulty, weakness, light-headedness, numbness and headaches.          Objective:      Physical Exam  Neurological:      Mental Status: He is alert and oriented to person, place, and time.      Comments: He is a thin man in no acute distress  Mild tenderness on the left at the lumbosacral junction with no palpable masses  Forward flexion is to 90° with mild pain at the lumbosacral junction.  Extension causes no pain.  He can heel and toe walk normally  Deep tendon reflexes +1 at both knees and both ankles  Strength is normal in both lower extremities  Straight leg raise negative bilaterally  HIMANSHU testing on the left causes left-sided low back " pain.  HIMANSHU testing on the right negative             Assessment:       1. Acute left-sided low back pain without sciatica    2. Dorsalgia, unspecified           Plan:     he has a nonfocal examination from a neurological standpoint and no historical red flags.  I suspect he has low back pain on basis of degenerative disc disease.  Will get some x-rays.  Given a shot of Toradol and Depo-Medrol followed by Medrol Dosepak.  Consider physical therapy.  He wants to defer that for now since he is in the process of moving and his wife has been ill and fairly immobile.  Will touch base with him in about a week and see how he is doing      Acute left-sided low back pain without sciatica    Dorsalgia, unspecified  -     X-Ray Lumbar Complete With Flex And Ext; Future; Expected date: 10/13/2020    Other orders  -     methylPREDNISolone acetate injection 40 mg  -     ketorolac injection 30 mg  -     methylPREDNISolone (MEDROL DOSEPACK) 4 mg tablet; use as directed  Dispense: 1 Package; Refill: 0

## 2020-10-31 LAB
ALBUMIN SERPL-MCNC: 4 G/DL (ref 3.6–5.1)
ALBUMIN/GLOB SERPL: 1.6 (CALC) (ref 1–2.5)
ALP SERPL-CCNC: 39 U/L (ref 35–144)
ALT SERPL-CCNC: 28 U/L (ref 9–46)
AST SERPL-CCNC: 35 U/L (ref 10–35)
BILIRUB SERPL-MCNC: 1.1 MG/DL (ref 0.2–1.2)
BUN SERPL-MCNC: 20 MG/DL (ref 7–25)
BUN/CREAT SERPL: NORMAL (CALC) (ref 6–22)
CALCIUM SERPL-MCNC: 9.7 MG/DL (ref 8.6–10.3)
CHLORIDE SERPL-SCNC: 105 MMOL/L (ref 98–110)
CHOLEST SERPL-MCNC: 116 MG/DL
CHOLEST/HDLC SERPL: 2.5 (CALC)
CO2 SERPL-SCNC: 31 MMOL/L (ref 20–32)
CREAT SERPL-MCNC: 0.82 MG/DL (ref 0.7–1.11)
ERYTHROCYTE [DISTWIDTH] IN BLOOD BY AUTOMATED COUNT: 12.3 % (ref 11–15)
GFRSERPLBLD MDRD-ARVRAT: 82 ML/MIN/1.73M2
GLOBULIN SER CALC-MCNC: 2.5 G/DL (CALC) (ref 1.9–3.7)
GLUCOSE SERPL-MCNC: 98 MG/DL (ref 65–99)
HCT VFR BLD AUTO: 35.9 % (ref 38.5–50)
HDLC SERPL-MCNC: 47 MG/DL
HGB BLD-MCNC: 12.1 G/DL (ref 13.2–17.1)
LDLC SERPL CALC-MCNC: 55 MG/DL (CALC)
MCH RBC QN AUTO: 32.8 PG (ref 27–33)
MCHC RBC AUTO-ENTMCNC: 33.7 G/DL (ref 32–36)
MCV RBC AUTO: 97.3 FL (ref 80–100)
NONHDLC SERPL-MCNC: 69 MG/DL (CALC)
PLATELET # BLD AUTO: 178 THOUSAND/UL (ref 140–400)
PMV BLD REES-ECKER: 10.5 FL (ref 7.5–12.5)
POTASSIUM SERPL-SCNC: 4.3 MMOL/L (ref 3.5–5.3)
PROT SERPL-MCNC: 6.5 G/DL (ref 6.1–8.1)
RBC # BLD AUTO: 3.69 MILLION/UL (ref 4.2–5.8)
SODIUM SERPL-SCNC: 141 MMOL/L (ref 135–146)
TRIGL SERPL-MCNC: 61 MG/DL
WBC # BLD AUTO: 8.3 THOUSAND/UL (ref 3.8–10.8)

## 2020-11-04 ENCOUNTER — PATIENT MESSAGE (OUTPATIENT)
Dept: FAMILY MEDICINE | Facility: CLINIC | Age: 80
End: 2020-11-04

## 2020-12-04 ENCOUNTER — TELEPHONE (OUTPATIENT)
Dept: FAMILY MEDICINE | Facility: CLINIC | Age: 80
End: 2020-12-04

## 2020-12-04 NOTE — TELEPHONE ENCOUNTER
"Spoke with pts wife who states this is "already taken care of" they are going to get tested out of precaution on Tuesday.  "

## 2020-12-04 NOTE — TELEPHONE ENCOUNTER
----- Message from Catarino Portillo sent at 12/4/2020  7:35 AM CST -----  Regarding: NEEDING CALL BACK  VM-Pt was exposed to covid on Monday he was around this person on thanksgiving wants to know what to do   [phone]

## 2020-12-07 DIAGNOSIS — K21.9 GASTRO-ESOPHAGEAL REFLUX DISEASE WITHOUT ESOPHAGITIS: ICD-10-CM

## 2020-12-07 RX ORDER — PANTOPRAZOLE SODIUM 40 MG/1
40 TABLET, DELAYED RELEASE ORAL DAILY
Qty: 90 TABLET | Refills: 1 | Status: SHIPPED | OUTPATIENT
Start: 2020-12-07 | End: 2021-06-03 | Stop reason: SDUPTHER

## 2020-12-31 NOTE — ASSESSMENT & PLAN NOTE
1st schedule attempt    Procedure: Breath Testing    Breath Testing: Lactose intolerance    HBT Reason for Referral: diarrhea    Preferred Location: [facility]    Scheduling Instructions: If you have not heard from the scheduling office within 2 business days, please call [phone].           Associated Diagnoses    Diarrhea, unspecified type [R19.7]  - Primary            Stable on current medical therapy

## 2021-01-04 ENCOUNTER — TELEPHONE (OUTPATIENT)
Dept: FAMILY MEDICINE | Facility: CLINIC | Age: 81
End: 2021-01-04

## 2021-01-07 LAB
ALBUMIN SERPL-MCNC: 4 G/DL (ref 3.6–5.1)
ALBUMIN/GLOB SERPL: 1.6 (CALC) (ref 1–2.5)
ALP SERPL-CCNC: 47 U/L (ref 35–144)
ALT SERPL-CCNC: 24 U/L (ref 9–46)
AST SERPL-CCNC: 31 U/L (ref 10–35)
BASOPHILS # BLD AUTO: 63 CELLS/UL (ref 0–200)
BASOPHILS NFR BLD AUTO: 0.9 %
BILIRUB SERPL-MCNC: 0.7 MG/DL (ref 0.2–1.2)
BUN SERPL-MCNC: 20 MG/DL (ref 7–25)
BUN/CREAT SERPL: NORMAL (CALC) (ref 6–22)
CALCIUM SERPL-MCNC: 9.9 MG/DL (ref 8.6–10.3)
CHLORIDE SERPL-SCNC: 107 MMOL/L (ref 98–110)
CHOLEST SERPL-MCNC: 111 MG/DL
CHOLEST/HDLC SERPL: 2.2 (CALC)
CO2 SERPL-SCNC: 31 MMOL/L (ref 20–32)
CREAT SERPL-MCNC: 0.95 MG/DL (ref 0.7–1.11)
EOSINOPHIL # BLD AUTO: 280 CELLS/UL (ref 15–500)
EOSINOPHIL NFR BLD AUTO: 4 %
ERYTHROCYTE [DISTWIDTH] IN BLOOD BY AUTOMATED COUNT: 11.8 % (ref 11–15)
GFRSERPLBLD MDRD-ARVRAT: 74 ML/MIN/1.73M2
GLOBULIN SER CALC-MCNC: 2.5 G/DL (CALC) (ref 1.9–3.7)
GLUCOSE SERPL-MCNC: 93 MG/DL (ref 65–99)
HCT VFR BLD AUTO: 38.7 % (ref 38.5–50)
HDLC SERPL-MCNC: 50 MG/DL
HGB BLD-MCNC: 12.7 G/DL (ref 13.2–17.1)
LDLC SERPL CALC-MCNC: 46 MG/DL (CALC)
LYMPHOCYTES # BLD AUTO: 1316 CELLS/UL (ref 850–3900)
LYMPHOCYTES NFR BLD AUTO: 18.8 %
MCH RBC QN AUTO: 32.2 PG (ref 27–33)
MCHC RBC AUTO-ENTMCNC: 32.8 G/DL (ref 32–36)
MCV RBC AUTO: 98 FL (ref 80–100)
MONOCYTES # BLD AUTO: 609 CELLS/UL (ref 200–950)
MONOCYTES NFR BLD AUTO: 8.7 %
NEUTROPHILS # BLD AUTO: 4732 CELLS/UL (ref 1500–7800)
NEUTROPHILS NFR BLD AUTO: 67.6 %
NONHDLC SERPL-MCNC: 61 MG/DL (CALC)
PLATELET # BLD AUTO: 172 THOUSAND/UL (ref 140–400)
PMV BLD REES-ECKER: 11.2 FL (ref 7.5–12.5)
POTASSIUM SERPL-SCNC: 4.4 MMOL/L (ref 3.5–5.3)
PROT SERPL-MCNC: 6.5 G/DL (ref 6.1–8.1)
RBC # BLD AUTO: 3.95 MILLION/UL (ref 4.2–5.8)
SODIUM SERPL-SCNC: 142 MMOL/L (ref 135–146)
TRIGL SERPL-MCNC: 72 MG/DL
WBC # BLD AUTO: 7 THOUSAND/UL (ref 3.8–10.8)

## 2021-01-10 ENCOUNTER — IMMUNIZATION (OUTPATIENT)
Dept: PRIMARY CARE CLINIC | Facility: CLINIC | Age: 81
End: 2021-01-10
Payer: MEDICARE

## 2021-01-10 DIAGNOSIS — Z23 NEED FOR VACCINATION: ICD-10-CM

## 2021-01-10 PROCEDURE — 0001A COVID-19, MRNA, LNP-S, PF, 30 MCG/0.3 ML DOSE VACCINE: ICD-10-PCS | Mod: S$GLB,,, | Performed by: FAMILY MEDICINE

## 2021-01-10 PROCEDURE — 0001A COVID-19, MRNA, LNP-S, PF, 30 MCG/0.3 ML DOSE VACCINE: CPT | Mod: S$GLB,,, | Performed by: FAMILY MEDICINE

## 2021-01-10 PROCEDURE — 91300 COVID-19, MRNA, LNP-S, PF, 30 MCG/0.3 ML DOSE VACCINE: ICD-10-PCS | Mod: S$GLB,,, | Performed by: FAMILY MEDICINE

## 2021-01-10 PROCEDURE — 91300 COVID-19, MRNA, LNP-S, PF, 30 MCG/0.3 ML DOSE VACCINE: CPT | Mod: S$GLB,,, | Performed by: FAMILY MEDICINE

## 2021-01-18 ENCOUNTER — OFFICE VISIT (OUTPATIENT)
Dept: FAMILY MEDICINE | Facility: CLINIC | Age: 81
End: 2021-01-18
Payer: MEDICARE

## 2021-01-18 VITALS
HEART RATE: 60 BPM | WEIGHT: 134 LBS | DIASTOLIC BLOOD PRESSURE: 68 MMHG | BODY MASS INDEX: 20.99 KG/M2 | SYSTOLIC BLOOD PRESSURE: 120 MMHG

## 2021-01-18 DIAGNOSIS — K21.9 GASTRO-ESOPHAGEAL REFLUX DISEASE WITHOUT ESOPHAGITIS: ICD-10-CM

## 2021-01-18 DIAGNOSIS — N40.0 ENLARGED PROSTATE WITHOUT LOWER URINARY TRACT SYMPTOMS (LUTS): ICD-10-CM

## 2021-01-18 DIAGNOSIS — M17.12 PRIMARY OSTEOARTHRITIS OF LEFT KNEE: ICD-10-CM

## 2021-01-18 DIAGNOSIS — I20.89 OTHER FORMS OF ANGINA PECTORIS: ICD-10-CM

## 2021-01-18 DIAGNOSIS — F41.9 ANXIETY: Primary | ICD-10-CM

## 2021-01-18 DIAGNOSIS — Z95.5 H/O HEART ARTERY STENT: ICD-10-CM

## 2021-01-18 DIAGNOSIS — M50.30 DDD (DEGENERATIVE DISC DISEASE), CERVICAL: ICD-10-CM

## 2021-01-18 DIAGNOSIS — H90.0 CONDUCTIVE HEARING LOSS, BILATERAL: ICD-10-CM

## 2021-01-18 DIAGNOSIS — I25.10 ATHEROSCLEROSIS OF NATIVE CORONARY ARTERY OF NATIVE HEART WITHOUT ANGINA PECTORIS: ICD-10-CM

## 2021-01-18 DIAGNOSIS — H40.1131 PRIMARY OPEN ANGLE GLAUCOMA (POAG) OF BOTH EYES, MILD STAGE: ICD-10-CM

## 2021-01-18 DIAGNOSIS — E78.00 PURE HYPERCHOLESTEROLEMIA: ICD-10-CM

## 2021-01-18 PROCEDURE — 1159F MED LIST DOCD IN RCRD: CPT | Mod: S$GLB,,, | Performed by: FAMILY MEDICINE

## 2021-01-18 PROCEDURE — 99214 PR OFFICE/OUTPT VISIT, EST, LEVL IV, 30-39 MIN: ICD-10-PCS | Mod: S$GLB,,, | Performed by: FAMILY MEDICINE

## 2021-01-18 PROCEDURE — 99214 OFFICE O/P EST MOD 30 MIN: CPT | Mod: S$GLB,,, | Performed by: FAMILY MEDICINE

## 2021-01-18 PROCEDURE — 1159F PR MEDICATION LIST DOCUMENTED IN MEDICAL RECORD: ICD-10-PCS | Mod: S$GLB,,, | Performed by: FAMILY MEDICINE

## 2021-01-18 RX ORDER — INFLUENZA A VIRUS A/MICHIGAN/45/2015 X-275 (H1N1) ANTIGEN (FORMALDEHYDE INACTIVATED), INFLUENZA A VIRUS A/SINGAPORE/INFIMH-16-0019/2016 IVR-186 (H3N2) ANTIGEN (FORMALDEHYDE INACTIVATED), INFLUENZA B VIRUS B/PHUKET/3073/2013 ANTIGEN (FORMALDEHYDE INACTIVATED), AND INFLUENZA B VIRUS B/MARYLAND/15/2016 BX-69A ANTIGEN (FORMALDEHYDE INACTIVATED) 60; 60; 60; 60 UG/.7ML; UG/.7ML; UG/.7ML; UG/.7ML
INJECTION, SUSPENSION INTRAMUSCULAR
COMMUNITY
Start: 2020-10-22 | End: 2022-06-28

## 2021-01-26 ENCOUNTER — TELEPHONE (OUTPATIENT)
Dept: FAMILY MEDICINE | Facility: CLINIC | Age: 81
End: 2021-01-26

## 2021-01-31 ENCOUNTER — IMMUNIZATION (OUTPATIENT)
Dept: PRIMARY CARE CLINIC | Facility: CLINIC | Age: 81
End: 2021-01-31
Payer: MEDICARE

## 2021-01-31 DIAGNOSIS — Z23 NEED FOR VACCINATION: Primary | ICD-10-CM

## 2021-01-31 PROCEDURE — 0002A COVID-19, MRNA, LNP-S, PF, 30 MCG/0.3 ML DOSE VACCINE: ICD-10-PCS | Mod: S$GLB,,, | Performed by: FAMILY MEDICINE

## 2021-01-31 PROCEDURE — 91300 COVID-19, MRNA, LNP-S, PF, 30 MCG/0.3 ML DOSE VACCINE: CPT | Mod: S$GLB,,, | Performed by: FAMILY MEDICINE

## 2021-01-31 PROCEDURE — 0002A COVID-19, MRNA, LNP-S, PF, 30 MCG/0.3 ML DOSE VACCINE: CPT | Mod: S$GLB,,, | Performed by: FAMILY MEDICINE

## 2021-01-31 PROCEDURE — 91300 COVID-19, MRNA, LNP-S, PF, 30 MCG/0.3 ML DOSE VACCINE: ICD-10-PCS | Mod: S$GLB,,, | Performed by: FAMILY MEDICINE

## 2021-04-08 ENCOUNTER — OFFICE VISIT (OUTPATIENT)
Dept: CARDIOLOGY | Facility: CLINIC | Age: 81
End: 2021-04-08
Payer: MEDICARE

## 2021-04-08 VITALS
BODY MASS INDEX: 21.5 KG/M2 | DIASTOLIC BLOOD PRESSURE: 60 MMHG | OXYGEN SATURATION: 98 % | SYSTOLIC BLOOD PRESSURE: 122 MMHG | HEART RATE: 59 BPM | WEIGHT: 137 LBS | HEIGHT: 67 IN

## 2021-04-08 DIAGNOSIS — D50.0 IRON DEFICIENCY ANEMIA DUE TO CHRONIC BLOOD LOSS: ICD-10-CM

## 2021-04-08 DIAGNOSIS — E78.2 MIXED HYPERLIPIDEMIA: ICD-10-CM

## 2021-04-08 DIAGNOSIS — I25.10 ATHEROSCLEROSIS OF NATIVE CORONARY ARTERY OF NATIVE HEART WITHOUT ANGINA PECTORIS: ICD-10-CM

## 2021-04-08 PROCEDURE — 1101F PT FALLS ASSESS-DOCD LE1/YR: CPT | Mod: CPTII,S$GLB,, | Performed by: INTERNAL MEDICINE

## 2021-04-08 PROCEDURE — 3288F PR FALLS RISK ASSESSMENT DOCUMENTED: ICD-10-PCS | Mod: CPTII,S$GLB,, | Performed by: INTERNAL MEDICINE

## 2021-04-08 PROCEDURE — 1101F PR PT FALLS ASSESS DOC 0-1 FALLS W/OUT INJ PAST YR: ICD-10-PCS | Mod: CPTII,S$GLB,, | Performed by: INTERNAL MEDICINE

## 2021-04-08 PROCEDURE — 1159F MED LIST DOCD IN RCRD: CPT | Mod: S$GLB,,, | Performed by: INTERNAL MEDICINE

## 2021-04-08 PROCEDURE — 3288F FALL RISK ASSESSMENT DOCD: CPT | Mod: CPTII,S$GLB,, | Performed by: INTERNAL MEDICINE

## 2021-04-08 PROCEDURE — 1159F PR MEDICATION LIST DOCUMENTED IN MEDICAL RECORD: ICD-10-PCS | Mod: S$GLB,,, | Performed by: INTERNAL MEDICINE

## 2021-04-08 PROCEDURE — 99214 OFFICE O/P EST MOD 30 MIN: CPT | Mod: S$GLB,,, | Performed by: INTERNAL MEDICINE

## 2021-04-08 PROCEDURE — 99214 PR OFFICE/OUTPT VISIT, EST, LEVL IV, 30-39 MIN: ICD-10-PCS | Mod: S$GLB,,, | Performed by: INTERNAL MEDICINE

## 2021-06-01 ENCOUNTER — TELEPHONE (OUTPATIENT)
Dept: FAMILY MEDICINE | Facility: CLINIC | Age: 81
End: 2021-06-01

## 2021-06-03 ENCOUNTER — OFFICE VISIT (OUTPATIENT)
Dept: FAMILY MEDICINE | Facility: CLINIC | Age: 81
End: 2021-06-03
Payer: MEDICARE

## 2021-06-03 VITALS
WEIGHT: 137 LBS | HEART RATE: 68 BPM | SYSTOLIC BLOOD PRESSURE: 138 MMHG | HEIGHT: 67 IN | BODY MASS INDEX: 21.5 KG/M2 | DIASTOLIC BLOOD PRESSURE: 66 MMHG

## 2021-06-03 DIAGNOSIS — F51.01 PRIMARY INSOMNIA: ICD-10-CM

## 2021-06-03 DIAGNOSIS — K21.9 GASTRO-ESOPHAGEAL REFLUX DISEASE WITHOUT ESOPHAGITIS: ICD-10-CM

## 2021-06-03 DIAGNOSIS — F41.9 ANXIETY: Primary | ICD-10-CM

## 2021-06-03 DIAGNOSIS — I25.10 ATHEROSCLEROSIS OF NATIVE CORONARY ARTERY OF NATIVE HEART WITHOUT ANGINA PECTORIS: ICD-10-CM

## 2021-06-03 PROCEDURE — 1159F MED LIST DOCD IN RCRD: CPT | Mod: S$GLB,,, | Performed by: NURSE PRACTITIONER

## 2021-06-03 PROCEDURE — 99214 PR OFFICE/OUTPT VISIT, EST, LEVL IV, 30-39 MIN: ICD-10-PCS | Mod: S$GLB,,, | Performed by: NURSE PRACTITIONER

## 2021-06-03 PROCEDURE — 99214 OFFICE O/P EST MOD 30 MIN: CPT | Mod: S$GLB,,, | Performed by: NURSE PRACTITIONER

## 2021-06-03 PROCEDURE — 3288F PR FALLS RISK ASSESSMENT DOCUMENTED: ICD-10-PCS | Mod: S$GLB,,, | Performed by: NURSE PRACTITIONER

## 2021-06-03 PROCEDURE — 3288F FALL RISK ASSESSMENT DOCD: CPT | Mod: S$GLB,,, | Performed by: NURSE PRACTITIONER

## 2021-06-03 PROCEDURE — 1101F PT FALLS ASSESS-DOCD LE1/YR: CPT | Mod: S$GLB,,, | Performed by: NURSE PRACTITIONER

## 2021-06-03 PROCEDURE — 1159F PR MEDICATION LIST DOCUMENTED IN MEDICAL RECORD: ICD-10-PCS | Mod: S$GLB,,, | Performed by: NURSE PRACTITIONER

## 2021-06-03 PROCEDURE — 1101F PR PT FALLS ASSESS DOC 0-1 FALLS W/OUT INJ PAST YR: ICD-10-PCS | Mod: S$GLB,,, | Performed by: NURSE PRACTITIONER

## 2021-06-03 RX ORDER — METOPROLOL SUCCINATE 25 MG/1
12.5 TABLET, EXTENDED RELEASE ORAL DAILY
Qty: 45 TABLET | Refills: 3 | Status: CANCELLED | OUTPATIENT
Start: 2021-06-03

## 2021-06-03 RX ORDER — PANTOPRAZOLE SODIUM 40 MG/1
40 TABLET, DELAYED RELEASE ORAL DAILY
Qty: 90 TABLET | Refills: 1 | Status: SHIPPED | OUTPATIENT
Start: 2021-06-03 | End: 2021-12-27 | Stop reason: SDUPTHER

## 2021-06-03 RX ORDER — ATORVASTATIN CALCIUM 40 MG/1
40 TABLET, FILM COATED ORAL NIGHTLY
Qty: 90 TABLET | Refills: 3 | Status: CANCELLED | OUTPATIENT
Start: 2021-06-03

## 2021-06-03 RX ORDER — TRAZODONE HYDROCHLORIDE 50 MG/1
50 TABLET ORAL NIGHTLY
Qty: 30 TABLET | Refills: 11 | Status: SHIPPED | OUTPATIENT
Start: 2021-06-03 | End: 2021-07-01

## 2021-06-30 ENCOUNTER — TELEPHONE (OUTPATIENT)
Dept: FAMILY MEDICINE | Facility: CLINIC | Age: 81
End: 2021-06-30

## 2021-06-30 DIAGNOSIS — Z79.899 ENCOUNTER FOR LONG-TERM (CURRENT) USE OF OTHER MEDICATIONS: ICD-10-CM

## 2021-06-30 DIAGNOSIS — Z00.00 ROUTINE GENERAL MEDICAL EXAMINATION AT A HEALTH CARE FACILITY: Primary | ICD-10-CM

## 2021-06-30 DIAGNOSIS — E78.2 MIXED HYPERLIPIDEMIA: ICD-10-CM

## 2021-06-30 DIAGNOSIS — E78.00 PURE HYPERCHOLESTEROLEMIA: ICD-10-CM

## 2021-07-07 ENCOUNTER — TELEPHONE (OUTPATIENT)
Dept: CARDIOLOGY | Facility: CLINIC | Age: 81
End: 2021-07-07

## 2021-07-14 ENCOUNTER — OFFICE VISIT (OUTPATIENT)
Dept: CARDIOLOGY | Facility: CLINIC | Age: 81
End: 2021-07-14
Payer: MEDICARE

## 2021-07-14 VITALS
BODY MASS INDEX: 21.66 KG/M2 | HEART RATE: 54 BPM | DIASTOLIC BLOOD PRESSURE: 82 MMHG | WEIGHT: 138 LBS | HEIGHT: 67 IN | OXYGEN SATURATION: 99 % | SYSTOLIC BLOOD PRESSURE: 154 MMHG

## 2021-07-14 DIAGNOSIS — I25.10 ATHEROSCLEROSIS OF NATIVE CORONARY ARTERY OF NATIVE HEART WITHOUT ANGINA PECTORIS: ICD-10-CM

## 2021-07-14 DIAGNOSIS — R07.9 CHEST PAIN, UNSPECIFIED TYPE: Primary | ICD-10-CM

## 2021-07-14 DIAGNOSIS — E78.2 MIXED HYPERLIPIDEMIA: ICD-10-CM

## 2021-07-14 DIAGNOSIS — I25.118 CORONARY ARTERY DISEASE OF NATIVE ARTERY OF NATIVE HEART WITH STABLE ANGINA PECTORIS: ICD-10-CM

## 2021-07-14 DIAGNOSIS — I10 ESSENTIAL HYPERTENSION: ICD-10-CM

## 2021-07-14 DIAGNOSIS — I44.7 LBBB (LEFT BUNDLE BRANCH BLOCK): ICD-10-CM

## 2021-07-14 DIAGNOSIS — E78.5 DYSLIPIDEMIA: ICD-10-CM

## 2021-07-14 PROCEDURE — 93010 EKG 12-LEAD: ICD-10-PCS | Mod: S$GLB,,, | Performed by: INTERNAL MEDICINE

## 2021-07-14 PROCEDURE — 3288F FALL RISK ASSESSMENT DOCD: CPT | Mod: CPTII,S$GLB,, | Performed by: NURSE PRACTITIONER

## 2021-07-14 PROCEDURE — 1159F PR MEDICATION LIST DOCUMENTED IN MEDICAL RECORD: ICD-10-PCS | Mod: S$GLB,,, | Performed by: NURSE PRACTITIONER

## 2021-07-14 PROCEDURE — 3077F PR MOST RECENT SYSTOLIC BLOOD PRESSURE >= 140 MM HG: ICD-10-PCS | Mod: CPTII,S$GLB,, | Performed by: NURSE PRACTITIONER

## 2021-07-14 PROCEDURE — 1126F AMNT PAIN NOTED NONE PRSNT: CPT | Mod: S$GLB,,, | Performed by: NURSE PRACTITIONER

## 2021-07-14 PROCEDURE — 3079F PR MOST RECENT DIASTOLIC BLOOD PRESSURE 80-89 MM HG: ICD-10-PCS | Mod: CPTII,S$GLB,, | Performed by: NURSE PRACTITIONER

## 2021-07-14 PROCEDURE — 1101F PT FALLS ASSESS-DOCD LE1/YR: CPT | Mod: CPTII,S$GLB,, | Performed by: NURSE PRACTITIONER

## 2021-07-14 PROCEDURE — 3288F PR FALLS RISK ASSESSMENT DOCUMENTED: ICD-10-PCS | Mod: CPTII,S$GLB,, | Performed by: NURSE PRACTITIONER

## 2021-07-14 PROCEDURE — 93005 EKG 12-LEAD: ICD-10-PCS | Mod: S$GLB,,, | Performed by: NURSE PRACTITIONER

## 2021-07-14 PROCEDURE — 93005 ELECTROCARDIOGRAM TRACING: CPT | Mod: S$GLB,,, | Performed by: NURSE PRACTITIONER

## 2021-07-14 PROCEDURE — 99214 PR OFFICE/OUTPT VISIT, EST, LEVL IV, 30-39 MIN: ICD-10-PCS | Mod: S$GLB,,, | Performed by: NURSE PRACTITIONER

## 2021-07-14 PROCEDURE — 93010 ELECTROCARDIOGRAM REPORT: CPT | Mod: S$GLB,,, | Performed by: INTERNAL MEDICINE

## 2021-07-14 PROCEDURE — 1126F PR PAIN SEVERITY QUANTIFIED, NO PAIN PRESENT: ICD-10-PCS | Mod: S$GLB,,, | Performed by: NURSE PRACTITIONER

## 2021-07-14 PROCEDURE — 99214 OFFICE O/P EST MOD 30 MIN: CPT | Mod: S$GLB,,, | Performed by: NURSE PRACTITIONER

## 2021-07-14 PROCEDURE — 1159F MED LIST DOCD IN RCRD: CPT | Mod: S$GLB,,, | Performed by: NURSE PRACTITIONER

## 2021-07-14 PROCEDURE — 1101F PR PT FALLS ASSESS DOC 0-1 FALLS W/OUT INJ PAST YR: ICD-10-PCS | Mod: CPTII,S$GLB,, | Performed by: NURSE PRACTITIONER

## 2021-07-14 PROCEDURE — 3079F DIAST BP 80-89 MM HG: CPT | Mod: CPTII,S$GLB,, | Performed by: NURSE PRACTITIONER

## 2021-07-14 PROCEDURE — 3077F SYST BP >= 140 MM HG: CPT | Mod: CPTII,S$GLB,, | Performed by: NURSE PRACTITIONER

## 2021-07-14 RX ORDER — CHOLECALCIFEROL (VITAMIN D3) 25 MCG
1000 TABLET ORAL DAILY
COMMUNITY
End: 2022-08-18

## 2021-07-14 RX ORDER — ASCORBIC ACID 500 MG
500 TABLET ORAL DAILY
COMMUNITY
End: 2022-08-18

## 2021-07-14 RX ORDER — HYOSCYAMINE SULFATE 0.125 MG
125 TABLET ORAL EVERY 6 HOURS PRN
Status: ON HOLD | COMMUNITY
End: 2022-12-06 | Stop reason: HOSPADM

## 2021-07-14 RX ORDER — NITROGLYCERIN 0.4 MG/1
0.4 TABLET SUBLINGUAL EVERY 5 MIN PRN
Qty: 30 TABLET | Refills: 1 | Status: SHIPPED | OUTPATIENT
Start: 2021-07-14 | End: 2022-12-04

## 2021-07-22 ENCOUNTER — HOSPITAL ENCOUNTER (OUTPATIENT)
Dept: CARDIOLOGY | Facility: CLINIC | Age: 81
Discharge: HOME | End: 2021-07-22
Attending: NURSE PRACTITIONER
Payer: MEDICARE

## 2021-07-22 ENCOUNTER — HOSPITAL ENCOUNTER (OUTPATIENT)
Dept: RADIOLOGY | Facility: CLINIC | Age: 81
Discharge: HOME | End: 2021-07-22
Attending: NURSE PRACTITIONER
Payer: MEDICARE

## 2021-07-22 VITALS — HEIGHT: 67 IN | BODY MASS INDEX: 21.66 KG/M2 | WEIGHT: 138 LBS

## 2021-07-22 DIAGNOSIS — I10 ESSENTIAL HYPERTENSION: ICD-10-CM

## 2021-07-22 DIAGNOSIS — E78.5 DYSLIPIDEMIA: ICD-10-CM

## 2021-07-22 DIAGNOSIS — R07.9 CHEST PAIN, UNSPECIFIED TYPE: ICD-10-CM

## 2021-07-22 DIAGNOSIS — I25.118 CORONARY ARTERY DISEASE OF NATIVE ARTERY OF NATIVE HEART WITH STABLE ANGINA PECTORIS: ICD-10-CM

## 2021-07-22 PROCEDURE — 93015 STRESS TEST WITH MYOCARDIAL PERFUSION (CUPID ONLY): ICD-10-PCS | Mod: S$GLB,,, | Performed by: INTERNAL MEDICINE

## 2021-07-22 PROCEDURE — 93306 ECHO (CUPID ONLY): ICD-10-PCS | Mod: S$GLB,,, | Performed by: INTERNAL MEDICINE

## 2021-07-22 PROCEDURE — 78452 HT MUSCLE IMAGE SPECT MULT: CPT | Mod: S$GLB,,, | Performed by: INTERNAL MEDICINE

## 2021-07-22 PROCEDURE — 93015 CV STRESS TEST SUPVJ I&R: CPT | Mod: S$GLB,,, | Performed by: INTERNAL MEDICINE

## 2021-07-22 PROCEDURE — A9502 STRESS TEST WITH MYOCARDIAL PERFUSION (CUPID ONLY): ICD-10-PCS | Mod: S$GLB,,, | Performed by: INTERNAL MEDICINE

## 2021-07-22 PROCEDURE — 78452 STRESS TEST WITH MYOCARDIAL PERFUSION (CUPID ONLY): ICD-10-PCS | Mod: S$GLB,,, | Performed by: INTERNAL MEDICINE

## 2021-07-22 PROCEDURE — A9502 TC99M TETROFOSMIN: HCPCS | Mod: S$GLB,,, | Performed by: INTERNAL MEDICINE

## 2021-07-22 PROCEDURE — 93306 TTE W/DOPPLER COMPLETE: CPT | Mod: S$GLB,,, | Performed by: INTERNAL MEDICINE

## 2021-07-22 RX ORDER — REGADENOSON 0.08 MG/ML
0.4 INJECTION, SOLUTION INTRAVENOUS ONCE
Status: COMPLETED | OUTPATIENT
Start: 2021-07-22 | End: 2021-07-22

## 2021-07-22 RX ADMIN — REGADENOSON 0.4 MG: 0.08 INJECTION, SOLUTION INTRAVENOUS at 09:07

## 2021-08-02 LAB
CV PHARM DOSE: 0.4 MG
CV STRESS BASE HR: 53 BPM
DIASTOLIC BLOOD PRESSURE: 82 MMHG
EJECTION FRACTION- HIGH: 65 %
END DIASTOLIC INDEX-HIGH: 158 ML/M2
END DIASTOLIC INDEX-LOW: 94 ML/M2
END SYSTOLIC INDEX-HIGH: 71 ML/M2
END SYSTOLIC INDEX-LOW: 33 ML/M2
NUC STRESS DIASTOLIC VOLUME INDEX: 76
NUC STRESS EJECTION FRACTION: 64 %
NUC STRESS SYSTOLIC VOLUME INDEX: 28
OHS CV CPX 1 MINUTE RECOVERY HEART RATE: 90 BPM
OHS CV CPX 85 PERCENT MAX PREDICTED HEART RATE MALE: 116
OHS CV CPX MAX PREDICTED HEART RATE: 137
OHS CV CPX PATIENT IS FEMALE: 0
OHS CV CPX PATIENT IS MALE: 1
OHS CV CPX PEAK DIASTOLIC BLOOD PRESSURE: 68 MMHG
OHS CV CPX PEAK HEAR RATE: 91 BPM
OHS CV CPX PEAK RATE PRESSURE PRODUCT: NORMAL
OHS CV CPX PEAK SYSTOLIC BLOOD PRESSURE: 130 MMHG
OHS CV CPX PERCENT MAX PREDICTED HEART RATE ACHIEVED: 66
OHS CV CPX RATE PRESSURE PRODUCT PRESENTING: 7844
RETIRED EF AND QEF - SEE NOTES: 53 %
SYSTOLIC BLOOD PRESSURE: 148 MMHG

## 2021-08-03 LAB
AORTIC ROOT ANNULUS: 3.4 CM
AORTIC VALVE CUSP SEPERATION: 1.7 CM
AV INDEX (PROSTH): 0.8
AV MEAN GRADIENT: 3 MMHG
AV PEAK GRADIENT: 6 MMHG
AV VALVE AREA: 2.78 CM2
AV VELOCITY RATIO: 0.66
BSA FOR ECHO PROCEDURE: 1.72 M2
CV ECHO LV RWT: 0.39 CM
DOP CALC AO PEAK VEL: 1.18 M/S
DOP CALC AO VTI: 26 CM
DOP CALC LVOT AREA: 3.5 CM2
DOP CALC LVOT DIAMETER: 2.1 CM
DOP CALC LVOT PEAK VEL: 0.78 M/S
DOP CALC LVOT STROKE VOLUME: 72.35 CM3
DOP CALCLVOT PEAK VEL VTI: 20.9 CM
E WAVE DECELERATION TIME: 127 MS
E/A RATIO: 0.75
E/E' RATIO: 5.85 M/S
ECHO LV POSTERIOR WALL: 0.92 CM (ref 0.6–1.1)
EJECTION FRACTION: 55 %
FRACTIONAL SHORTENING: 27 % (ref 28–44)
INTERVENTRICULAR SEPTUM: 1.18 CM (ref 0.6–1.1)
IVRT: 95 MS
LA MAJOR: 3.8 CM
LEFT ATRIUM SIZE: 3.6 CM
LEFT INTERNAL DIMENSION IN SYSTOLE: 3.44 CM (ref 2.1–4)
LEFT VENTRICLE MASS INDEX: 103 G/M2
LEFT VENTRICULAR INTERNAL DIMENSION IN DIASTOLE: 4.74 CM (ref 3.5–6)
LEFT VENTRICULAR MASS: 178.25 G
LV LATERAL E/E' RATIO: 5.43 M/S
LV SEPTAL E/E' RATIO: 6.33 M/S
MV PEAK A VEL: 0.51 M/S
MV PEAK E VEL: 0.38 M/S
PISA TR MAX VEL: 1.97 M/S
RA PRESSURE: 3 MMHG
RIGHT VENTRICULAR END-DIASTOLIC DIMENSION: 3.22 CM
TDI LATERAL: 0.07 M/S
TDI SEPTAL: 0.06 M/S
TDI: 0.07 M/S
TR MAX PG: 16 MMHG
TV REST PULMONARY ARTERY PRESSURE: 19 MMHG

## 2021-08-10 ENCOUNTER — TELEPHONE (OUTPATIENT)
Dept: CARDIOLOGY | Facility: CLINIC | Age: 81
End: 2021-08-10

## 2021-08-11 ENCOUNTER — OFFICE VISIT (OUTPATIENT)
Dept: CARDIOLOGY | Facility: CLINIC | Age: 81
End: 2021-08-11
Payer: MEDICARE

## 2021-08-11 VITALS
WEIGHT: 135 LBS | HEIGHT: 67 IN | BODY MASS INDEX: 21.19 KG/M2 | HEART RATE: 67 BPM | DIASTOLIC BLOOD PRESSURE: 64 MMHG | OXYGEN SATURATION: 99 % | SYSTOLIC BLOOD PRESSURE: 128 MMHG

## 2021-08-11 DIAGNOSIS — G47.00 INSOMNIA, UNSPECIFIED TYPE: ICD-10-CM

## 2021-08-11 DIAGNOSIS — I25.10 ATHEROSCLEROSIS OF NATIVE CORONARY ARTERY OF NATIVE HEART WITHOUT ANGINA PECTORIS: ICD-10-CM

## 2021-08-11 DIAGNOSIS — I25.10 CORONARY ARTERY DISEASE INVOLVING NATIVE CORONARY ARTERY OF NATIVE HEART WITHOUT ANGINA PECTORIS: Primary | ICD-10-CM

## 2021-08-11 DIAGNOSIS — I44.7 LBBB (LEFT BUNDLE BRANCH BLOCK): ICD-10-CM

## 2021-08-11 DIAGNOSIS — I10 ESSENTIAL HYPERTENSION: ICD-10-CM

## 2021-08-11 DIAGNOSIS — E78.2 MIXED HYPERLIPIDEMIA: ICD-10-CM

## 2021-08-11 PROCEDURE — 99214 OFFICE O/P EST MOD 30 MIN: CPT | Mod: S$GLB,,, | Performed by: NURSE PRACTITIONER

## 2021-08-11 PROCEDURE — 1126F PR PAIN SEVERITY QUANTIFIED, NO PAIN PRESENT: ICD-10-PCS | Mod: CPTII,S$GLB,, | Performed by: NURSE PRACTITIONER

## 2021-08-11 PROCEDURE — 3078F PR MOST RECENT DIASTOLIC BLOOD PRESSURE < 80 MM HG: ICD-10-PCS | Mod: CPTII,S$GLB,, | Performed by: NURSE PRACTITIONER

## 2021-08-11 PROCEDURE — 1101F PR PT FALLS ASSESS DOC 0-1 FALLS W/OUT INJ PAST YR: ICD-10-PCS | Mod: CPTII,S$GLB,, | Performed by: NURSE PRACTITIONER

## 2021-08-11 PROCEDURE — 3074F SYST BP LT 130 MM HG: CPT | Mod: CPTII,S$GLB,, | Performed by: NURSE PRACTITIONER

## 2021-08-11 PROCEDURE — 3288F FALL RISK ASSESSMENT DOCD: CPT | Mod: CPTII,S$GLB,, | Performed by: NURSE PRACTITIONER

## 2021-08-11 PROCEDURE — 1160F PR REVIEW ALL MEDS BY PRESCRIBER/CLIN PHARMACIST DOCUMENTED: ICD-10-PCS | Mod: CPTII,S$GLB,, | Performed by: NURSE PRACTITIONER

## 2021-08-11 PROCEDURE — 3078F DIAST BP <80 MM HG: CPT | Mod: CPTII,S$GLB,, | Performed by: NURSE PRACTITIONER

## 2021-08-11 PROCEDURE — 3074F PR MOST RECENT SYSTOLIC BLOOD PRESSURE < 130 MM HG: ICD-10-PCS | Mod: CPTII,S$GLB,, | Performed by: NURSE PRACTITIONER

## 2021-08-11 PROCEDURE — 99214 PR OFFICE/OUTPT VISIT, EST, LEVL IV, 30-39 MIN: ICD-10-PCS | Mod: S$GLB,,, | Performed by: NURSE PRACTITIONER

## 2021-08-11 PROCEDURE — 3288F PR FALLS RISK ASSESSMENT DOCUMENTED: ICD-10-PCS | Mod: CPTII,S$GLB,, | Performed by: NURSE PRACTITIONER

## 2021-08-11 PROCEDURE — 1159F MED LIST DOCD IN RCRD: CPT | Mod: CPTII,S$GLB,, | Performed by: NURSE PRACTITIONER

## 2021-08-11 PROCEDURE — 1126F AMNT PAIN NOTED NONE PRSNT: CPT | Mod: CPTII,S$GLB,, | Performed by: NURSE PRACTITIONER

## 2021-08-11 PROCEDURE — 1101F PT FALLS ASSESS-DOCD LE1/YR: CPT | Mod: CPTII,S$GLB,, | Performed by: NURSE PRACTITIONER

## 2021-08-11 PROCEDURE — 1160F RVW MEDS BY RX/DR IN RCRD: CPT | Mod: CPTII,S$GLB,, | Performed by: NURSE PRACTITIONER

## 2021-08-11 PROCEDURE — 1159F PR MEDICATION LIST DOCUMENTED IN MEDICAL RECORD: ICD-10-PCS | Mod: CPTII,S$GLB,, | Performed by: NURSE PRACTITIONER

## 2021-08-15 PROBLEM — G47.00 INSOMNIA: Status: ACTIVE | Noted: 2021-08-15

## 2021-10-01 ENCOUNTER — LAB VISIT (OUTPATIENT)
Dept: LAB | Facility: HOSPITAL | Age: 81
End: 2021-10-01
Attending: NURSE PRACTITIONER
Payer: MEDICARE

## 2021-10-01 DIAGNOSIS — I25.10 CORONARY ARTERY DISEASE INVOLVING NATIVE CORONARY ARTERY OF NATIVE HEART WITHOUT ANGINA PECTORIS: ICD-10-CM

## 2021-10-01 DIAGNOSIS — I44.7 LBBB (LEFT BUNDLE BRANCH BLOCK): ICD-10-CM

## 2021-10-01 DIAGNOSIS — I10 ESSENTIAL HYPERTENSION: ICD-10-CM

## 2021-10-01 LAB
ALBUMIN SERPL BCP-MCNC: 4.4 G/DL (ref 3.5–5.2)
ALP SERPL-CCNC: 44 U/L (ref 55–135)
ALT SERPL W/O P-5'-P-CCNC: 29 U/L (ref 10–44)
ANION GAP SERPL CALC-SCNC: 9 MMOL/L (ref 8–16)
AST SERPL-CCNC: 36 U/L (ref 10–40)
BILIRUB SERPL-MCNC: 1.2 MG/DL (ref 0.1–1)
BUN SERPL-MCNC: 17 MG/DL (ref 8–23)
CALCIUM SERPL-MCNC: 10.2 MG/DL (ref 8.7–10.5)
CHLORIDE SERPL-SCNC: 107 MMOL/L (ref 95–110)
CHOLEST SERPL-MCNC: 111 MG/DL (ref 120–199)
CHOLEST/HDLC SERPL: 2.4 {RATIO} (ref 2–5)
CO2 SERPL-SCNC: 27 MMOL/L (ref 23–29)
CREAT SERPL-MCNC: 1 MG/DL (ref 0.5–1.4)
ERYTHROCYTE [DISTWIDTH] IN BLOOD BY AUTOMATED COUNT: 12.6 % (ref 11.5–14.5)
EST. GFR  (AFRICAN AMERICAN): >60 ML/MIN/1.73 M^2
EST. GFR  (NON AFRICAN AMERICAN): >60 ML/MIN/1.73 M^2
GLUCOSE SERPL-MCNC: 111 MG/DL (ref 70–110)
HCT VFR BLD AUTO: 38.7 % (ref 40–54)
HDLC SERPL-MCNC: 47 MG/DL (ref 40–75)
HDLC SERPL: 42.3 % (ref 20–50)
HGB BLD-MCNC: 12.9 G/DL (ref 14–18)
LDLC SERPL CALC-MCNC: 52.8 MG/DL (ref 63–159)
MCH RBC QN AUTO: 32.7 PG (ref 27–31)
MCHC RBC AUTO-ENTMCNC: 33.3 G/DL (ref 32–36)
MCV RBC AUTO: 98 FL (ref 82–98)
NONHDLC SERPL-MCNC: 64 MG/DL
PLATELET # BLD AUTO: 168 K/UL (ref 150–450)
PMV BLD AUTO: 10 FL (ref 9.2–12.9)
POTASSIUM SERPL-SCNC: 4.5 MMOL/L (ref 3.5–5.1)
PROT SERPL-MCNC: 7.4 G/DL (ref 6–8.4)
RBC # BLD AUTO: 3.95 M/UL (ref 4.6–6.2)
SODIUM SERPL-SCNC: 143 MMOL/L (ref 136–145)
TRIGL SERPL-MCNC: 56 MG/DL (ref 30–150)
TSH SERPL DL<=0.005 MIU/L-ACNC: 3.27 UIU/ML (ref 0.34–5.6)
WBC # BLD AUTO: 6.98 K/UL (ref 3.9–12.7)

## 2021-10-01 PROCEDURE — 80061 LIPID PANEL: CPT | Performed by: NURSE PRACTITIONER

## 2021-10-01 PROCEDURE — 36415 COLL VENOUS BLD VENIPUNCTURE: CPT | Performed by: NURSE PRACTITIONER

## 2021-10-01 PROCEDURE — 85027 COMPLETE CBC AUTOMATED: CPT | Performed by: NURSE PRACTITIONER

## 2021-10-01 PROCEDURE — 80053 COMPREHEN METABOLIC PANEL: CPT | Performed by: NURSE PRACTITIONER

## 2021-10-01 PROCEDURE — 84443 ASSAY THYROID STIM HORMONE: CPT | Performed by: NURSE PRACTITIONER

## 2021-10-11 ENCOUNTER — OFFICE VISIT (OUTPATIENT)
Dept: CARDIOLOGY | Facility: CLINIC | Age: 81
End: 2021-10-11
Payer: MEDICARE

## 2021-10-11 VITALS
HEIGHT: 67 IN | HEART RATE: 47 BPM | BODY MASS INDEX: 21.66 KG/M2 | DIASTOLIC BLOOD PRESSURE: 72 MMHG | WEIGHT: 138 LBS | SYSTOLIC BLOOD PRESSURE: 122 MMHG | OXYGEN SATURATION: 97 %

## 2021-10-11 DIAGNOSIS — I25.10 ATHEROSCLEROSIS OF NATIVE CORONARY ARTERY OF NATIVE HEART WITHOUT ANGINA PECTORIS: ICD-10-CM

## 2021-10-11 DIAGNOSIS — E78.2 MIXED HYPERLIPIDEMIA: ICD-10-CM

## 2021-10-11 DIAGNOSIS — I10 ESSENTIAL HYPERTENSION: ICD-10-CM

## 2021-10-11 PROCEDURE — 1159F MED LIST DOCD IN RCRD: CPT | Mod: CPTII,S$GLB,, | Performed by: INTERNAL MEDICINE

## 2021-10-11 PROCEDURE — 1101F PR PT FALLS ASSESS DOC 0-1 FALLS W/OUT INJ PAST YR: ICD-10-PCS | Mod: CPTII,S$GLB,, | Performed by: INTERNAL MEDICINE

## 2021-10-11 PROCEDURE — 1160F RVW MEDS BY RX/DR IN RCRD: CPT | Mod: CPTII,S$GLB,, | Performed by: INTERNAL MEDICINE

## 2021-10-11 PROCEDURE — 1101F PT FALLS ASSESS-DOCD LE1/YR: CPT | Mod: CPTII,S$GLB,, | Performed by: INTERNAL MEDICINE

## 2021-10-11 PROCEDURE — 3288F FALL RISK ASSESSMENT DOCD: CPT | Mod: CPTII,S$GLB,, | Performed by: INTERNAL MEDICINE

## 2021-10-11 PROCEDURE — 3288F PR FALLS RISK ASSESSMENT DOCUMENTED: ICD-10-PCS | Mod: CPTII,S$GLB,, | Performed by: INTERNAL MEDICINE

## 2021-10-11 PROCEDURE — 3078F DIAST BP <80 MM HG: CPT | Mod: CPTII,S$GLB,, | Performed by: INTERNAL MEDICINE

## 2021-10-11 PROCEDURE — 99213 PR OFFICE/OUTPT VISIT, EST, LEVL III, 20-29 MIN: ICD-10-PCS | Mod: S$GLB,,, | Performed by: INTERNAL MEDICINE

## 2021-10-11 PROCEDURE — 1126F PR PAIN SEVERITY QUANTIFIED, NO PAIN PRESENT: ICD-10-PCS | Mod: CPTII,S$GLB,, | Performed by: INTERNAL MEDICINE

## 2021-10-11 PROCEDURE — 3074F PR MOST RECENT SYSTOLIC BLOOD PRESSURE < 130 MM HG: ICD-10-PCS | Mod: CPTII,S$GLB,, | Performed by: INTERNAL MEDICINE

## 2021-10-11 PROCEDURE — 3078F PR MOST RECENT DIASTOLIC BLOOD PRESSURE < 80 MM HG: ICD-10-PCS | Mod: CPTII,S$GLB,, | Performed by: INTERNAL MEDICINE

## 2021-10-11 PROCEDURE — 1160F PR REVIEW ALL MEDS BY PRESCRIBER/CLIN PHARMACIST DOCUMENTED: ICD-10-PCS | Mod: CPTII,S$GLB,, | Performed by: INTERNAL MEDICINE

## 2021-10-11 PROCEDURE — 1159F PR MEDICATION LIST DOCUMENTED IN MEDICAL RECORD: ICD-10-PCS | Mod: CPTII,S$GLB,, | Performed by: INTERNAL MEDICINE

## 2021-10-11 PROCEDURE — 3074F SYST BP LT 130 MM HG: CPT | Mod: CPTII,S$GLB,, | Performed by: INTERNAL MEDICINE

## 2021-10-11 PROCEDURE — 1126F AMNT PAIN NOTED NONE PRSNT: CPT | Mod: CPTII,S$GLB,, | Performed by: INTERNAL MEDICINE

## 2021-10-11 PROCEDURE — 99213 OFFICE O/P EST LOW 20 MIN: CPT | Mod: S$GLB,,, | Performed by: INTERNAL MEDICINE

## 2021-10-11 RX ORDER — ESCITALOPRAM OXALATE 10 MG/1
10 TABLET ORAL DAILY
COMMUNITY
Start: 2021-09-30 | End: 2022-06-28

## 2021-10-13 ENCOUNTER — IMMUNIZATION (OUTPATIENT)
Dept: PRIMARY CARE CLINIC | Facility: CLINIC | Age: 81
End: 2021-10-13
Payer: MEDICARE

## 2021-10-13 DIAGNOSIS — Z23 NEED FOR VACCINATION: Primary | ICD-10-CM

## 2021-10-13 PROCEDURE — 0003A COVID-19, MRNA, LNP-S, PF, 30 MCG/0.3 ML DOSE VACCINE: CPT | Mod: S$GLB,,, | Performed by: FAMILY MEDICINE

## 2021-10-13 PROCEDURE — 91300 COVID-19, MRNA, LNP-S, PF, 30 MCG/0.3 ML DOSE VACCINE: CPT | Mod: S$GLB,,, | Performed by: FAMILY MEDICINE

## 2021-10-13 PROCEDURE — 0003A COVID-19, MRNA, LNP-S, PF, 30 MCG/0.3 ML DOSE VACCINE: ICD-10-PCS | Mod: S$GLB,,, | Performed by: FAMILY MEDICINE

## 2021-10-13 PROCEDURE — 91300 COVID-19, MRNA, LNP-S, PF, 30 MCG/0.3 ML DOSE VACCINE: ICD-10-PCS | Mod: S$GLB,,, | Performed by: FAMILY MEDICINE

## 2021-12-21 LAB
ALBUMIN SERPL-MCNC: 4.1 G/DL (ref 3.6–5.1)
ALBUMIN/GLOB SERPL: 1.6 (CALC) (ref 1–2.5)
ALP SERPL-CCNC: 45 U/L (ref 35–144)
ALT SERPL-CCNC: 21 U/L (ref 9–46)
APPEARANCE UR: CLEAR
AST SERPL-CCNC: 31 U/L (ref 10–35)
BACTERIA #/AREA URNS HPF: NORMAL /HPF
BACTERIA UR CULT: NORMAL
BASOPHILS # BLD AUTO: 52 CELLS/UL (ref 0–200)
BASOPHILS NFR BLD AUTO: 0.8 %
BILIRUB SERPL-MCNC: 0.8 MG/DL (ref 0.2–1.2)
BILIRUB UR QL STRIP: NEGATIVE
BUN SERPL-MCNC: 16 MG/DL (ref 7–25)
BUN/CREAT SERPL: NORMAL (CALC) (ref 6–22)
CALCIUM SERPL-MCNC: 9.8 MG/DL (ref 8.6–10.3)
CHLORIDE SERPL-SCNC: 106 MMOL/L (ref 98–110)
CHOLEST SERPL-MCNC: 96 MG/DL
CHOLEST/HDLC SERPL: 2.1 (CALC)
CO2 SERPL-SCNC: 32 MMOL/L (ref 20–32)
COLOR UR: YELLOW
CREAT SERPL-MCNC: 1.01 MG/DL (ref 0.7–1.11)
EOSINOPHIL # BLD AUTO: 267 CELLS/UL (ref 15–500)
EOSINOPHIL NFR BLD AUTO: 4.1 %
ERYTHROCYTE [DISTWIDTH] IN BLOOD BY AUTOMATED COUNT: 12 % (ref 11–15)
GLOBULIN SER CALC-MCNC: 2.6 G/DL (CALC) (ref 1.9–3.7)
GLUCOSE SERPL-MCNC: 91 MG/DL (ref 65–99)
GLUCOSE UR QL STRIP: NEGATIVE
HCT VFR BLD AUTO: 37.1 % (ref 38.5–50)
HDLC SERPL-MCNC: 45 MG/DL
HGB BLD-MCNC: 12.4 G/DL (ref 13.2–17.1)
HGB UR QL STRIP: NEGATIVE
HYALINE CASTS #/AREA URNS LPF: NORMAL /LPF
KETONES UR QL STRIP: NEGATIVE
LDLC SERPL CALC-MCNC: 37 MG/DL (CALC)
LEUKOCYTE ESTERASE UR QL STRIP: NEGATIVE
LYMPHOCYTES # BLD AUTO: 1222 CELLS/UL (ref 850–3900)
LYMPHOCYTES NFR BLD AUTO: 18.8 %
MCH RBC QN AUTO: 32.6 PG (ref 27–33)
MCHC RBC AUTO-ENTMCNC: 33.4 G/DL (ref 32–36)
MCV RBC AUTO: 97.6 FL (ref 80–100)
MONOCYTES # BLD AUTO: 585 CELLS/UL (ref 200–950)
MONOCYTES NFR BLD AUTO: 9 %
NEUTROPHILS # BLD AUTO: 4375 CELLS/UL (ref 1500–7800)
NEUTROPHILS NFR BLD AUTO: 67.3 %
NITRITE UR QL STRIP: NEGATIVE
NONHDLC SERPL-MCNC: 51 MG/DL (CALC)
PH UR STRIP: 6 [PH] (ref 5–8)
PLATELET # BLD AUTO: 147 THOUSAND/UL (ref 140–400)
PMV BLD REES-ECKER: 11.2 FL (ref 7.5–12.5)
POTASSIUM SERPL-SCNC: 4.1 MMOL/L (ref 3.5–5.3)
PROT SERPL-MCNC: 6.7 G/DL (ref 6.1–8.1)
PROT UR QL STRIP: NEGATIVE
RBC # BLD AUTO: 3.8 MILLION/UL (ref 4.2–5.8)
RBC #/AREA URNS HPF: NORMAL /HPF
SODIUM SERPL-SCNC: 141 MMOL/L (ref 135–146)
SP GR UR STRIP: 1.02 (ref 1–1.03)
SQUAMOUS #/AREA URNS HPF: NORMAL /HPF
TRIGL SERPL-MCNC: 49 MG/DL
TSH SERPL-ACNC: 3.25 MIU/L (ref 0.4–4.5)
WBC # BLD AUTO: 6.5 THOUSAND/UL (ref 3.8–10.8)
WBC #/AREA URNS HPF: NORMAL /HPF

## 2021-12-27 ENCOUNTER — OFFICE VISIT (OUTPATIENT)
Dept: FAMILY MEDICINE | Facility: CLINIC | Age: 81
End: 2021-12-27
Payer: MEDICARE

## 2021-12-27 VITALS
HEIGHT: 67 IN | BODY MASS INDEX: 21.19 KG/M2 | HEART RATE: 52 BPM | SYSTOLIC BLOOD PRESSURE: 120 MMHG | WEIGHT: 135 LBS | DIASTOLIC BLOOD PRESSURE: 60 MMHG

## 2021-12-27 DIAGNOSIS — Z95.5 H/O HEART ARTERY STENT: ICD-10-CM

## 2021-12-27 DIAGNOSIS — G31.84 MILD COGNITIVE IMPAIRMENT WITH MEMORY LOSS: Primary | ICD-10-CM

## 2021-12-27 DIAGNOSIS — F51.01 PRIMARY INSOMNIA: ICD-10-CM

## 2021-12-27 DIAGNOSIS — M50.30 DDD (DEGENERATIVE DISC DISEASE), CERVICAL: ICD-10-CM

## 2021-12-27 DIAGNOSIS — D64.9 NORMOCHROMIC NORMOCYTIC ANEMIA: ICD-10-CM

## 2021-12-27 DIAGNOSIS — I25.10 ATHEROSCLEROSIS OF NATIVE CORONARY ARTERY OF NATIVE HEART WITHOUT ANGINA PECTORIS: ICD-10-CM

## 2021-12-27 DIAGNOSIS — E78.2 MIXED HYPERLIPIDEMIA: ICD-10-CM

## 2021-12-27 DIAGNOSIS — F41.9 ANXIETY: ICD-10-CM

## 2021-12-27 DIAGNOSIS — K21.9 GASTRO-ESOPHAGEAL REFLUX DISEASE WITHOUT ESOPHAGITIS: ICD-10-CM

## 2021-12-27 DIAGNOSIS — M54.12 CERVICAL RADICULOPATHY: ICD-10-CM

## 2021-12-27 DIAGNOSIS — M17.12 PRIMARY OSTEOARTHRITIS OF LEFT KNEE: ICD-10-CM

## 2021-12-27 DIAGNOSIS — H40.1131 PRIMARY OPEN ANGLE GLAUCOMA (POAG) OF BOTH EYES, MILD STAGE: ICD-10-CM

## 2021-12-27 DIAGNOSIS — H90.0 CONDUCTIVE HEARING LOSS, BILATERAL: ICD-10-CM

## 2021-12-27 PROCEDURE — 1159F MED LIST DOCD IN RCRD: CPT | Mod: S$GLB,,, | Performed by: FAMILY MEDICINE

## 2021-12-27 PROCEDURE — 99214 OFFICE O/P EST MOD 30 MIN: CPT | Mod: S$GLB,,, | Performed by: FAMILY MEDICINE

## 2021-12-27 PROCEDURE — 1159F PR MEDICATION LIST DOCUMENTED IN MEDICAL RECORD: ICD-10-PCS | Mod: S$GLB,,, | Performed by: FAMILY MEDICINE

## 2021-12-27 PROCEDURE — 3078F PR MOST RECENT DIASTOLIC BLOOD PRESSURE < 80 MM HG: ICD-10-PCS | Mod: S$GLB,,, | Performed by: FAMILY MEDICINE

## 2021-12-27 PROCEDURE — 3074F SYST BP LT 130 MM HG: CPT | Mod: S$GLB,,, | Performed by: FAMILY MEDICINE

## 2021-12-27 PROCEDURE — 99214 PR OFFICE/OUTPT VISIT, EST, LEVL IV, 30-39 MIN: ICD-10-PCS | Mod: S$GLB,,, | Performed by: FAMILY MEDICINE

## 2021-12-27 PROCEDURE — 3078F DIAST BP <80 MM HG: CPT | Mod: S$GLB,,, | Performed by: FAMILY MEDICINE

## 2021-12-27 PROCEDURE — 3074F PR MOST RECENT SYSTOLIC BLOOD PRESSURE < 130 MM HG: ICD-10-PCS | Mod: S$GLB,,, | Performed by: FAMILY MEDICINE

## 2021-12-27 RX ORDER — PANTOPRAZOLE SODIUM 40 MG/1
40 TABLET, DELAYED RELEASE ORAL DAILY
Qty: 90 TABLET | Refills: 1 | Status: SHIPPED | OUTPATIENT
Start: 2021-12-27 | End: 2022-06-28 | Stop reason: SDUPTHER

## 2021-12-27 RX ORDER — TRAZODONE HYDROCHLORIDE 50 MG/1
TABLET ORAL
Qty: 90 TABLET | Refills: 1 | Status: CANCELLED | OUTPATIENT
Start: 2021-12-27

## 2022-01-31 ENCOUNTER — TELEPHONE (OUTPATIENT)
Dept: CARDIOLOGY | Facility: CLINIC | Age: 82
End: 2022-01-31
Payer: MEDICARE

## 2022-01-31 NOTE — TELEPHONE ENCOUNTER
----- Message from Ash Rodríguez sent at 1/31/2022  4:13 PM CST -----  Type:  Needs Medical Advice    Who Called: pt daughter  Symptoms (please be specific):   How long has patient had these symptoms:   Pharmacy name and phone #:    Would the patient rather a call back or a response via [facility]? Daughter page call back   Best Call Back Number:   Additional Information: bp concern, dizzy spells

## 2022-01-31 NOTE — TELEPHONE ENCOUNTER
S/w pts daughter & 2 weak spells with in 10 mins  Low bp- 80/ 40 ; 100/ 40    Advised to hold the metoprolol if he hasnt taken it today.  Advised will send message to Cher CARROLL. She would like to speak to Cher

## 2022-04-13 ENCOUNTER — OFFICE VISIT (OUTPATIENT)
Dept: CARDIOLOGY | Facility: CLINIC | Age: 82
End: 2022-04-13
Payer: MEDICARE

## 2022-04-13 VITALS
SYSTOLIC BLOOD PRESSURE: 130 MMHG | HEIGHT: 67 IN | HEART RATE: 71 BPM | WEIGHT: 135 LBS | BODY MASS INDEX: 21.19 KG/M2 | DIASTOLIC BLOOD PRESSURE: 80 MMHG | OXYGEN SATURATION: 98 %

## 2022-04-13 DIAGNOSIS — I25.10 ATHEROSCLEROSIS OF NATIVE CORONARY ARTERY OF NATIVE HEART WITHOUT ANGINA PECTORIS: ICD-10-CM

## 2022-04-13 DIAGNOSIS — E78.00 PURE HYPERCHOLESTEROLEMIA: ICD-10-CM

## 2022-04-13 DIAGNOSIS — G31.84 MILD COGNITIVE IMPAIRMENT: ICD-10-CM

## 2022-04-13 DIAGNOSIS — E78.2 MIXED HYPERLIPIDEMIA: Primary | ICD-10-CM

## 2022-04-13 PROCEDURE — 3075F SYST BP GE 130 - 139MM HG: CPT | Mod: CPTII,S$GLB,, | Performed by: INTERNAL MEDICINE

## 2022-04-13 PROCEDURE — 1159F MED LIST DOCD IN RCRD: CPT | Mod: CPTII,S$GLB,, | Performed by: INTERNAL MEDICINE

## 2022-04-13 PROCEDURE — 3288F PR FALLS RISK ASSESSMENT DOCUMENTED: ICD-10-PCS | Mod: CPTII,S$GLB,, | Performed by: INTERNAL MEDICINE

## 2022-04-13 PROCEDURE — 1160F RVW MEDS BY RX/DR IN RCRD: CPT | Mod: CPTII,S$GLB,, | Performed by: INTERNAL MEDICINE

## 2022-04-13 PROCEDURE — 3079F PR MOST RECENT DIASTOLIC BLOOD PRESSURE 80-89 MM HG: ICD-10-PCS | Mod: CPTII,S$GLB,, | Performed by: INTERNAL MEDICINE

## 2022-04-13 PROCEDURE — 99214 PR OFFICE/OUTPT VISIT, EST, LEVL IV, 30-39 MIN: ICD-10-PCS | Mod: S$GLB,,, | Performed by: INTERNAL MEDICINE

## 2022-04-13 PROCEDURE — 1126F PR PAIN SEVERITY QUANTIFIED, NO PAIN PRESENT: ICD-10-PCS | Mod: CPTII,S$GLB,, | Performed by: INTERNAL MEDICINE

## 2022-04-13 PROCEDURE — 3288F FALL RISK ASSESSMENT DOCD: CPT | Mod: CPTII,S$GLB,, | Performed by: INTERNAL MEDICINE

## 2022-04-13 PROCEDURE — 1159F PR MEDICATION LIST DOCUMENTED IN MEDICAL RECORD: ICD-10-PCS | Mod: CPTII,S$GLB,, | Performed by: INTERNAL MEDICINE

## 2022-04-13 PROCEDURE — 99214 OFFICE O/P EST MOD 30 MIN: CPT | Mod: S$GLB,,, | Performed by: INTERNAL MEDICINE

## 2022-04-13 PROCEDURE — 1101F PT FALLS ASSESS-DOCD LE1/YR: CPT | Mod: CPTII,S$GLB,, | Performed by: INTERNAL MEDICINE

## 2022-04-13 PROCEDURE — 3075F PR MOST RECENT SYSTOLIC BLOOD PRESS GE 130-139MM HG: ICD-10-PCS | Mod: CPTII,S$GLB,, | Performed by: INTERNAL MEDICINE

## 2022-04-13 PROCEDURE — 3079F DIAST BP 80-89 MM HG: CPT | Mod: CPTII,S$GLB,, | Performed by: INTERNAL MEDICINE

## 2022-04-13 PROCEDURE — 1101F PR PT FALLS ASSESS DOC 0-1 FALLS W/OUT INJ PAST YR: ICD-10-PCS | Mod: CPTII,S$GLB,, | Performed by: INTERNAL MEDICINE

## 2022-04-13 PROCEDURE — 1126F AMNT PAIN NOTED NONE PRSNT: CPT | Mod: CPTII,S$GLB,, | Performed by: INTERNAL MEDICINE

## 2022-04-13 PROCEDURE — 1160F PR REVIEW ALL MEDS BY PRESCRIBER/CLIN PHARMACIST DOCUMENTED: ICD-10-PCS | Mod: CPTII,S$GLB,, | Performed by: INTERNAL MEDICINE

## 2022-04-13 RX ORDER — EZETIMIBE 10 MG/1
10 TABLET ORAL DAILY
Qty: 90 TABLET | Refills: 3 | Status: SHIPPED | OUTPATIENT
Start: 2022-04-13 | End: 2022-06-28 | Stop reason: SDUPTHER

## 2022-04-13 NOTE — ASSESSMENT & PLAN NOTE
Due to the mild cognitive impairment will discontinue Lipitor and see if there is any improvement meanwhile will attempt ezetimibe to control his cholesterol

## 2022-04-13 NOTE — ASSESSMENT & PLAN NOTE
Will discontinue atorvastatin to see if he is attributing to his impairment.  Will attempt ezetimibe to control his cholesterol

## 2022-04-13 NOTE — PROGRESS NOTES
Patient ID:  Jarred Harrison is a 82 y.o. male who presents for follow-up of Coronary Artery Disease, Hypertension, and Hyperlipidemia      Approximately 1 week ago when he was bending down he was having episodes of dizziness.  That has resolved.  He discontinue taking Lexapro approximately 5-6 weeks ago.  He was diagnosed this month of by a neuro psychologist with mild cognitive impairment during the discussion of memory loss a contributing factor was a statin.  He has been on Lipitor 40 mg daily.  He does have coronary artery disease.  Will discontinue the Lipitor and try ezetimibe to see if will control his cholesterol.      Past Medical History:   Diagnosis Date    AI (aortic insufficiency)     Coronary artery disease     GERD (gastroesophageal reflux disease)     Hyperlipidemia     Hypertension     LBBB (left bundle branch block)     SSS (sick sinus syndrome)         Past Surgical History:   Procedure Laterality Date    CARDIAC CATHETERIZATION      CATARACT EXTRACTION      CORONARY ANGIOPLASTY  08/29/82015    CORONARY STENT PLACEMENT  2015    Dr Parr    FOOT SURGERY Right     KNEE SURGERY Left     right elbow            Current Outpatient Medications   Medication Instructions    ascorbic acid (vitamin C) (VITAMIN C) 500 mg, Oral, Daily    aspirin (ECOTRIN) 81 mg, Oral, Daily    atorvastatin (LIPITOR) 40 MG tablet TAKE 1 TABLET BY MOUTH AT  BEDTIME    cetirizine (ZYRTEC) 10 MG tablet TK 1 T PO D PRF ALLERGIES/SINUS COG    coenzyme Q10 100 mg, Oral, Daily    EScitalopram oxalate (LEXAPRO) 10 mg, Oral, Daily    ezetimibe (ZETIA) 10 mg, Oral, Daily    FLUZONE HIGHDOSE QUAD 20-21  mcg/0.7 mL Syrg ADM 0.7ML IM UTD    gluc tellez/chondro tellez A/vit C/Mn (GLUCOSAMINE 1500 COMPLEX ORAL) Glucosamine    AND CHONDROITIN 1500, 1200 MG MULTIVITAMIN    glucosamine-chondroitin 500-400 mg tablet 1 tablet, Oral, 3 times daily    hyoscyamine (ANASPAZ,LEVSIN) 125 mcg, Oral, 2 times daily     "latanoprost 0.005 % ophthalmic solution No dose, route, or frequency recorded.    metoprolol succinate (TOPROL-XL) 25 MG 24 hr tablet TAKE ONE-HALF TABLET BY  MOUTH ONCE DAILY    multivitamin capsule 1 capsule, Oral, Daily    nitroGLYCERIN (NITROSTAT) 0.4 mg, Sublingual, Every 5 min PRN    pantoprazole (PROTONIX) 40 mg, Oral, Daily    traZODone (DESYREL) 50 MG tablet 1 tablet QHS prn insomnia    vitamin D (VITAMIN D3) 1,000 Units, Oral, Daily        Review of patient's allergies indicates:   Allergen Reactions    Hydrocodone         Review of Systems   Cardiovascular: Negative for chest pain, dyspnea on exertion and palpitations.   Respiratory: Negative for cough and shortness of breath.    Gastrointestinal: Negative for heartburn.   Neurological: Positive for light-headedness.   Psychiatric/Behavioral: Positive for memory loss.        Objective:     Vitals:    04/13/22 1300   BP: 130/80   BP Location: Left arm   Patient Position: Sitting   BP Method: Medium (Manual)   Pulse: 71   SpO2: 98%   Weight: 61.2 kg (135 lb)   Height: 5' 7" (1.702 m)       Physical Exam  Vitals and nursing note reviewed.   Constitutional:       Appearance: He is well-developed.   HENT:      Head: Normocephalic and atraumatic.   Eyes:      Conjunctiva/sclera: Conjunctivae normal.   Cardiovascular:      Rate and Rhythm: Normal rate and regular rhythm.      Heart sounds: Normal heart sounds.   Pulmonary:      Effort: Pulmonary effort is normal.      Breath sounds: Normal breath sounds.   Abdominal:      General: Bowel sounds are normal.      Palpations: Abdomen is soft.   Musculoskeletal:         General: Normal range of motion.   Skin:     General: Skin is warm and dry.   Neurological:      Mental Status: He is alert and oriented to person, place, and time.   Psychiatric:         Behavior: Behavior normal.         Thought Content: Thought content normal.         Judgment: Judgment normal.       CMP  Sodium   Date Value Ref Range Status "   12/20/2021 141 135 - 146 mmol/L Final     Potassium   Date Value Ref Range Status   12/20/2021 4.1 3.5 - 5.3 mmol/L Final     Chloride   Date Value Ref Range Status   12/20/2021 106 98 - 110 mmol/L Final     CO2   Date Value Ref Range Status   12/20/2021 32 20 - 32 mmol/L Final     Glucose   Date Value Ref Range Status   12/20/2021 91 65 - 99 mg/dL Final     Comment:                   Fasting reference interval          BUN   Date Value Ref Range Status   12/20/2021 16 7 - 25 mg/dL Final     Creatinine   Date Value Ref Range Status   12/20/2021 1.01 0.70 - 1.11 mg/dL Final     Comment:     For patients >49 years of age, the reference limit  for Creatinine is approximately 13% higher for people  identified as -American.          Calcium   Date Value Ref Range Status   12/20/2021 9.8 8.6 - 10.3 mg/dL Final     Total Protein   Date Value Ref Range Status   12/20/2021 6.7 6.1 - 8.1 g/dL Final     Albumin   Date Value Ref Range Status   12/20/2021 4.1 3.6 - 5.1 g/dL Final     Total Bilirubin   Date Value Ref Range Status   12/20/2021 0.8 0.2 - 1.2 mg/dL Final     Alkaline Phosphatase   Date Value Ref Range Status   10/01/2021 44 (L) 55 - 135 U/L Final     AST   Date Value Ref Range Status   12/20/2021 31 10 - 35 U/L Final     ALT   Date Value Ref Range Status   12/20/2021 21 9 - 46 U/L Final     Anion Gap   Date Value Ref Range Status   10/01/2021 9 8 - 16 mmol/L Final     eGFR if    Date Value Ref Range Status   12/20/2021 79 > OR = 60 mL/min/1.73m2 Final     eGFR if non    Date Value Ref Range Status   12/20/2021 68 > OR = 60 mL/min/1.73m2 Final      BMP  Lab Results   Component Value Date     12/20/2021    K 4.1 12/20/2021     12/20/2021    CO2 32 12/20/2021    BUN 16 12/20/2021    CREATININE 1.01 12/20/2021    CALCIUM 9.8 12/20/2021    ANIONGAP 9 10/01/2021    ESTGFRAFRICA 79 12/20/2021    EGFRNONAA 68 12/20/2021      BNP  @LABRCNTIP(BNP,BNPTRIAGEBLO)@   Lab  Results   Component Value Date    CHOL 96 12/20/2021    CHOL 111 (L) 10/01/2021    CHOL 111 01/06/2021     Lab Results   Component Value Date    HDL 45 12/20/2021    HDL 47 10/01/2021    HDL 50 01/06/2021     Lab Results   Component Value Date    LDLCALC 37 12/20/2021    LDLCALC 52.8 (L) 10/01/2021    LDLCALC 46 01/06/2021     Lab Results   Component Value Date    TRIG 49 12/20/2021    TRIG 56 10/01/2021    TRIG 72 01/06/2021     Lab Results   Component Value Date    CHOLHDL 2.1 12/20/2021    CHOLHDL 42.3 10/01/2021    CHOLHDL 2.2 01/06/2021      Lab Results   Component Value Date    TSH 3.25 12/20/2021     No results found for: LABA1C, HGBA1C  Lab Results   Component Value Date    WBC 6.5 12/20/2021    HGB 12.4 (L) 12/20/2021    HCT 37.1 (L) 12/20/2021    MCV 97.6 12/20/2021     12/20/2021         Results for orders placed during the hospital encounter of 07/22/21    Echo    Interpretation Summary  · The left ventricle is normal in size with normal systolic function.  · The estimated ejection fraction is 55%.  · Normal left ventricular diastolic function.  · Normal right ventricular size with normal right ventricular systolic function.  · Mild mitral regurgitation.  · Mild tricuspid regurgitation.  · Normal central venous pressure (3 mmHg).  · The estimated PA systolic pressure is 19 mmHg.     No results found for this or any previous visit.         Assessment:       Atherosclerosis of native coronary artery of native heart without angina pectoris    Stable no symptoms of angina    Pure hypercholesterolemia   Due to the mild cognitive impairment will discontinue Lipitor and see if there is any improvement meanwhile will attempt ezetimibe to control his cholesterol    Mild cognitive impairment   Will discontinue atorvastatin to see if he is attributing to his impairment.  Will attempt ezetimibe to control his cholesterol       Plan:        discontinue atorvastatin   ezetimibe 10 mg p.o. daily   return to the  office in 6 months with a lipid profile and a CMP

## 2022-04-29 ENCOUNTER — TELEPHONE (OUTPATIENT)
Dept: FAMILY MEDICINE | Facility: CLINIC | Age: 82
End: 2022-04-29

## 2022-04-29 ENCOUNTER — PATIENT MESSAGE (OUTPATIENT)
Dept: CARDIOLOGY | Facility: CLINIC | Age: 82
End: 2022-04-29
Payer: MEDICARE

## 2022-04-29 NOTE — TELEPHONE ENCOUNTER
Spoke with pts daughter who states he has been having a lot of frequent falls after standing. This has been going on for the last month. They are checking his blood pressure and it does frequently run low - they are following up with Dr. De La Torre. She states she would really like  to be able to come out for him as well to check his vital signs and help evaluate him. She is aware multiple things could cause this including this BP so they are going to come in on Monday with Dr. Waldron to discuss but over the weekend they are going to check BP more frequently and make sure he changes positions very slowly.

## 2022-04-29 NOTE — TELEPHONE ENCOUNTER
----- Message from Marianna Melvin sent at 4/29/2022  8:31 AM CDT -----  Pt's daughter Snehal addison said he is having episodes of dizziness and falling over the last few weeks several times.  # [phone]

## 2022-05-02 ENCOUNTER — OFFICE VISIT (OUTPATIENT)
Dept: FAMILY MEDICINE | Facility: CLINIC | Age: 82
End: 2022-05-02
Payer: MEDICARE

## 2022-05-02 VITALS
HEIGHT: 67 IN | BODY MASS INDEX: 21.19 KG/M2 | HEART RATE: 60 BPM | SYSTOLIC BLOOD PRESSURE: 118 MMHG | DIASTOLIC BLOOD PRESSURE: 60 MMHG | WEIGHT: 135 LBS

## 2022-05-02 DIAGNOSIS — I25.10 ATHEROSCLEROSIS OF NATIVE CORONARY ARTERY OF NATIVE HEART WITHOUT ANGINA PECTORIS: ICD-10-CM

## 2022-05-02 DIAGNOSIS — H90.0 CONDUCTIVE HEARING LOSS, BILATERAL: ICD-10-CM

## 2022-05-02 DIAGNOSIS — F51.01 PRIMARY INSOMNIA: ICD-10-CM

## 2022-05-02 DIAGNOSIS — I10 ESSENTIAL HYPERTENSION: ICD-10-CM

## 2022-05-02 DIAGNOSIS — I49.5 SICK SINUS SYNDROME: ICD-10-CM

## 2022-05-02 DIAGNOSIS — Z95.5 H/O HEART ARTERY STENT: ICD-10-CM

## 2022-05-02 DIAGNOSIS — E78.2 MIXED HYPERLIPIDEMIA: ICD-10-CM

## 2022-05-02 DIAGNOSIS — G31.84 MILD COGNITIVE IMPAIRMENT: ICD-10-CM

## 2022-05-02 DIAGNOSIS — I95.1 ORTHOSTATIC HYPOTENSION: Primary | ICD-10-CM

## 2022-05-02 DIAGNOSIS — F41.9 ANXIETY: ICD-10-CM

## 2022-05-02 PROCEDURE — 3078F PR MOST RECENT DIASTOLIC BLOOD PRESSURE < 80 MM HG: ICD-10-PCS | Mod: S$GLB,,, | Performed by: FAMILY MEDICINE

## 2022-05-02 PROCEDURE — 3074F SYST BP LT 130 MM HG: CPT | Mod: S$GLB,,, | Performed by: FAMILY MEDICINE

## 2022-05-02 PROCEDURE — 1100F PTFALLS ASSESS-DOCD GE2>/YR: CPT | Mod: S$GLB,,, | Performed by: FAMILY MEDICINE

## 2022-05-02 PROCEDURE — 3074F PR MOST RECENT SYSTOLIC BLOOD PRESSURE < 130 MM HG: ICD-10-PCS | Mod: S$GLB,,, | Performed by: FAMILY MEDICINE

## 2022-05-02 PROCEDURE — 3288F FALL RISK ASSESSMENT DOCD: CPT | Mod: S$GLB,,, | Performed by: FAMILY MEDICINE

## 2022-05-02 PROCEDURE — 3288F PR FALLS RISK ASSESSMENT DOCUMENTED: ICD-10-PCS | Mod: S$GLB,,, | Performed by: FAMILY MEDICINE

## 2022-05-02 PROCEDURE — 1100F PR PT FALLS ASSESS DOC 2+ FALLS/FALL W/INJURY/YR: ICD-10-PCS | Mod: S$GLB,,, | Performed by: FAMILY MEDICINE

## 2022-05-02 PROCEDURE — 99214 OFFICE O/P EST MOD 30 MIN: CPT | Mod: S$GLB,,, | Performed by: FAMILY MEDICINE

## 2022-05-02 PROCEDURE — 3078F DIAST BP <80 MM HG: CPT | Mod: S$GLB,,, | Performed by: FAMILY MEDICINE

## 2022-05-02 PROCEDURE — 99214 PR OFFICE/OUTPT VISIT, EST, LEVL IV, 30-39 MIN: ICD-10-PCS | Mod: S$GLB,,, | Performed by: FAMILY MEDICINE

## 2022-05-04 LAB
ALBUMIN SERPL-MCNC: 4.2 G/DL (ref 3.6–5.1)
ALBUMIN/GLOB SERPL: 1.7 (CALC) (ref 1–2.5)
ALP SERPL-CCNC: 49 U/L (ref 35–144)
ALT SERPL-CCNC: 16 U/L (ref 9–46)
APPEARANCE UR: CLEAR
AST SERPL-CCNC: 26 U/L (ref 10–35)
BACTERIA #/AREA URNS HPF: NORMAL /HPF
BACTERIA UR CULT: NORMAL
BASOPHILS # BLD AUTO: 61 CELLS/UL (ref 0–200)
BASOPHILS NFR BLD AUTO: 1 %
BILIRUB SERPL-MCNC: 0.7 MG/DL (ref 0.2–1.2)
BILIRUB UR QL STRIP: NEGATIVE
BUN SERPL-MCNC: 20 MG/DL (ref 7–25)
BUN/CREAT SERPL: NORMAL (CALC) (ref 6–22)
CALCIUM SERPL-MCNC: 10.1 MG/DL (ref 8.6–10.3)
CHLORIDE SERPL-SCNC: 104 MMOL/L (ref 98–110)
CHOLEST SERPL-MCNC: 118 MG/DL
CHOLEST/HDLC SERPL: 2.7 (CALC)
CO2 SERPL-SCNC: 29 MMOL/L (ref 20–32)
COLOR UR: YELLOW
CREAT SERPL-MCNC: 1.11 MG/DL (ref 0.7–1.11)
EOSINOPHIL # BLD AUTO: 305 CELLS/UL (ref 15–500)
EOSINOPHIL NFR BLD AUTO: 5 %
ERYTHROCYTE [DISTWIDTH] IN BLOOD BY AUTOMATED COUNT: 11.9 % (ref 11–15)
GLOBULIN SER CALC-MCNC: 2.5 G/DL (CALC) (ref 1.9–3.7)
GLUCOSE SERPL-MCNC: 91 MG/DL (ref 65–99)
GLUCOSE UR QL STRIP: NEGATIVE
HCT VFR BLD AUTO: 37.3 % (ref 38.5–50)
HDLC SERPL-MCNC: 44 MG/DL
HGB BLD-MCNC: 12.3 G/DL (ref 13.2–17.1)
HGB UR QL STRIP: NEGATIVE
HYALINE CASTS #/AREA URNS LPF: NORMAL /LPF
KETONES UR QL STRIP: NEGATIVE
LDLC SERPL CALC-MCNC: 60 MG/DL (CALC)
LEUKOCYTE ESTERASE UR QL STRIP: NEGATIVE
LYMPHOCYTES # BLD AUTO: 1196 CELLS/UL (ref 850–3900)
LYMPHOCYTES NFR BLD AUTO: 19.6 %
MCH RBC QN AUTO: 32.5 PG (ref 27–33)
MCHC RBC AUTO-ENTMCNC: 33 G/DL (ref 32–36)
MCV RBC AUTO: 98.7 FL (ref 80–100)
MONOCYTES # BLD AUTO: 531 CELLS/UL (ref 200–950)
MONOCYTES NFR BLD AUTO: 8.7 %
NEUTROPHILS # BLD AUTO: 4008 CELLS/UL (ref 1500–7800)
NEUTROPHILS NFR BLD AUTO: 65.7 %
NITRITE UR QL STRIP: NEGATIVE
NONHDLC SERPL-MCNC: 74 MG/DL (CALC)
PH UR STRIP: 7 [PH] (ref 5–8)
PLATELET # BLD AUTO: 170 THOUSAND/UL (ref 140–400)
PMV BLD REES-ECKER: 10.3 FL (ref 7.5–12.5)
POTASSIUM SERPL-SCNC: 4.7 MMOL/L (ref 3.5–5.3)
PROT SERPL-MCNC: 6.7 G/DL (ref 6.1–8.1)
PROT UR QL STRIP: NEGATIVE
RBC # BLD AUTO: 3.78 MILLION/UL (ref 4.2–5.8)
RBC #/AREA URNS HPF: NORMAL /HPF
SODIUM SERPL-SCNC: 139 MMOL/L (ref 135–146)
SP GR UR STRIP: 1.01 (ref 1–1.03)
SQUAMOUS #/AREA URNS HPF: NORMAL /HPF
TRIGL SERPL-MCNC: 56 MG/DL
TSH SERPL-ACNC: 3.96 MIU/L (ref 0.4–4.5)
WBC # BLD AUTO: 6.1 THOUSAND/UL (ref 3.8–10.8)
WBC #/AREA URNS HPF: NORMAL /HPF

## 2022-05-04 NOTE — PROGRESS NOTES
SUBJECTIVE:    Patient ID: Jarred Harrison is a 82 y.o. male.    Chief Complaint: Fall (4 falls in the last 3 weeks // memory and equilibrium loss // double vision //abc  )    This 82-year-old male has been falling down.  He feels tired all the time, he family has noticed increased short-term memory loss.  Other symptoms include falling asleep easily.  2 hour nap taken after lunch.    Disequilibrium and dizziness is exacerbated by looking up.  He gets some double vision later in the day while watching TV.    Recently moved to a [place] in no longer does yd chores he will do house chores and cleaning.    Neurology Dr. Bennie Giron.  Patient does not have hallucinations, he remembers 3 recent presidents.    He was taken off his metoprolol and atorvastatin thinking that may have exacerbated some of the memory loss.    Blood pressures have been rather low lately.  Home blood pressure measurements show he dips down in the 90 or 80 systolic range, no syncope however ;no loss of consciousness.    Family members take turns sleeping in the house with patient and his wife 3 times a week.  This is to  monitor their safety      No visits with results within 6 Month(s) from this visit.   Latest known visit with results is:   Lab Visit on 10/01/2021   Component Date Value Ref Range Status    WBC 10/01/2021 6.98  3.90 - 12.70 K/uL Final    RBC 10/01/2021 3.95 (A) 4.60 - 6.20 M/uL Final    Hemoglobin 10/01/2021 12.9 (A) 14.0 - 18.0 g/dL Final    Hematocrit 10/01/2021 38.7 (A) 40.0 - 54.0 % Final    MCV 10/01/2021 98  82 - 98 fL Final    MCH 10/01/2021 32.7 (A) 27.0 - 31.0 pg Final    MCHC 10/01/2021 33.3  32.0 - 36.0 g/dL Final    RDW 10/01/2021 12.6  11.5 - 14.5 % Final    Platelets 10/01/2021 168  150 - 450 K/uL Final    MPV 10/01/2021 10.0  9.2 - 12.9 fL Final    Sodium 10/01/2021 143  136 - 145 mmol/L Final    Potassium 10/01/2021 4.5  3.5 - 5.1 mmol/L Final    Chloride 10/01/2021 107  95 - 110 mmol/L Final    CO2  10/01/2021 27  23 - 29 mmol/L Final    Glucose 10/01/2021 111 (A) 70 - 110 mg/dL Final    BUN 10/01/2021 17  8 - 23 mg/dL Final    Creatinine 10/01/2021 1.0  0.5 - 1.4 mg/dL Final    Calcium 10/01/2021 10.2  8.7 - 10.5 mg/dL Final    Total Protein 10/01/2021 7.4  6.0 - 8.4 g/dL Final    Albumin 10/01/2021 4.4  3.5 - 5.2 g/dL Final    Total Bilirubin 10/01/2021 1.2 (A) 0.1 - 1.0 mg/dL Final    Alkaline Phosphatase 10/01/2021 44 (A) 55 - 135 U/L Final    AST 10/01/2021 36  10 - 40 U/L Final    ALT 10/01/2021 29  10 - 44 U/L Final    Anion Gap 10/01/2021 9  8 - 16 mmol/L Final    eGFR if African American 10/01/2021 >60.0  >60 mL/min/1.73 m^2 Final    eGFR if non African American 10/01/2021 >60.0  >60 mL/min/1.73 m^2 Final    Cholesterol 10/01/2021 111 (A) 120 - 199 mg/dL Final    Triglycerides 10/01/2021 56  30 - 150 mg/dL Final    HDL 10/01/2021 47  40 - 75 mg/dL Final    LDL Cholesterol 10/01/2021 52.8 (A) 63.0 - 159.0 mg/dL Final    HDL/Cholesterol Ratio 10/01/2021 42.3  20.0 - 50.0 % Final    Total Cholesterol/HDL Ratio 10/01/2021 2.4  2.0 - 5.0 Final    Non-HDL Cholesterol 10/01/2021 64  mg/dL Final    TSH 10/01/2021 3.270  0.340 - 5.600 uIU/mL Final       Past Medical History:   Diagnosis Date    AI (aortic insufficiency)     Coronary artery disease     GERD (gastroesophageal reflux disease)     Hyperlipidemia     Hypertension     LBBB (left bundle branch block)     SSS (sick sinus syndrome)      Social History     Socioeconomic History    Marital status:    Tobacco Use    Smoking status: Never Smoker    Smokeless tobacco: Never Used   Substance and Sexual Activity    Alcohol use: Not Currently     Past Surgical History:   Procedure Laterality Date    CARDIAC CATHETERIZATION      CATARACT EXTRACTION      CORONARY ANGIOPLASTY  08/29/82015    CORONARY STENT PLACEMENT  2015    Dr Parr    FOOT SURGERY Right     KNEE SURGERY Left     right elbow       No family  history on file.    Review of patient's allergies indicates:   Allergen Reactions    Hydrocodone        Current Outpatient Medications:     ascorbic acid, vitamin C, (VITAMIN C) 500 MG tablet, Take 500 mg by mouth once daily., Disp: , Rfl:     aspirin (ECOTRIN) 81 MG EC tablet, Take 81 mg by mouth once daily., Disp: , Rfl:     cetirizine (ZYRTEC) 10 MG tablet, TK 1 T PO D PRF ALLERGIES/SINUS COG, Disp: , Rfl:     coenzyme Q10 100 mg capsule, Take 100 mg by mouth once daily., Disp: , Rfl:     EScitalopram oxalate (LEXAPRO) 10 MG tablet, Take 10 mg by mouth once daily., Disp: , Rfl:     ezetimibe (ZETIA) 10 mg tablet, Take 1 tablet (10 mg total) by mouth once daily., Disp: 90 tablet, Rfl: 3    gluc tellez/chondro tellez A/vit C/Mn (GLUCOSAMINE 1500 COMPLEX ORAL), Glucosamine   AND CHONDROITIN 1500, 1200 MG MULTIVITAMIN, Disp: , Rfl:     glucosamine-chondroitin 500-400 mg tablet, Take 1 tablet by mouth 3 (three) times daily., Disp: , Rfl:     latanoprost 0.005 % ophthalmic solution, , Disp: , Rfl:     pantoprazole (PROTONIX) 40 MG tablet, Take 1 tablet (40 mg total) by mouth once daily., Disp: 90 tablet, Rfl: 1    traZODone (DESYREL) 50 MG tablet, 1 tablet QHS prn insomnia, Disp: 90 tablet, Rfl: 1    vitamin D (VITAMIN D3) 1000 units Tab, Take 1,000 Units by mouth once daily., Disp: , Rfl:     atorvastatin (LIPITOR) 40 MG tablet, TAKE 1 TABLET BY MOUTH AT  BEDTIME (Patient not taking: Reported on 5/2/2022), Disp: 90 tablet, Rfl: 3    FLUZONE HIGHDOSE QUAD 20-21  mcg/0.7 mL Syrg, ADM 0.7ML IM UTD, Disp: , Rfl:     hyoscyamine (ANASPAZ,LEVSIN) 0.125 mg Tab, Take 125 mcg by mouth 2 (two) times a day., Disp: , Rfl:     metoprolol succinate (TOPROL-XL) 25 MG 24 hr tablet, TAKE ONE-HALF TABLET BY  MOUTH ONCE DAILY (Patient not taking: Reported on 5/2/2022), Disp: 45 tablet, Rfl: 3    multivitamin capsule, Take 1 capsule by mouth once daily., Disp: , Rfl:     nitroGLYCERIN (NITROSTAT) 0.4 MG SL tablet, Place  "1 tablet (0.4 mg total) under the tongue every 5 (five) minutes as needed for Chest pain. (Patient not taking: Reported on 5/2/2022), Disp: 30 tablet, Rfl: 1    Review of Systems   Constitutional: Negative for appetite change, chills, fatigue, fever and unexpected weight change.   HENT: Negative for congestion, ear pain and trouble swallowing.    Eyes: Negative for pain, discharge and visual disturbance.   Respiratory: Negative for apnea, cough, shortness of breath and wheezing.    Cardiovascular: Negative for chest pain and leg swelling.   Gastrointestinal: Negative for abdominal pain, blood in stool, constipation, diarrhea, nausea and vomiting.   Endocrine: Negative for cold intolerance, heat intolerance and polydipsia.   Genitourinary: Negative for dysuria, hematuria, testicular pain and urgency.   Musculoskeletal: Negative for gait problem, joint swelling and myalgias.   Neurological: Positive for dizziness and numbness. Negative for seizures.        Wobbly gait frequent falls   Psychiatric/Behavioral: Negative for agitation, behavioral problems and hallucinations. The patient is not nervous/anxious.           Objective:      Vitals:    05/02/22 1551   BP: 118/60   Pulse: 60   Weight: 61.2 kg (135 lb)   Height: 5' 7" (1.702 m)     Physical Exam  Vitals and nursing note reviewed.   Constitutional:       General: He is not in acute distress.     Appearance: He is well-developed. He is not toxic-appearing.      Comments: Elderly male with frequent confusion, poor short-term memory   HENT:      Head: Normocephalic and atraumatic.      Right Ear: Tympanic membrane and external ear normal.      Left Ear: Tympanic membrane and external ear normal.      Ears:      Comments: Bilateral hearing aids, hearing loss is present     Nose: Nose normal.      Mouth/Throat:      Pharynx: Oropharynx is clear.   Eyes:      Pupils: Pupils are equal, round, and reactive to light.   Neck:      Thyroid: No thyromegaly.      Vascular: No " carotid bruit.   Cardiovascular:      Rate and Rhythm: Normal rate and regular rhythm.      Heart sounds: Normal heart sounds. No murmur heard.  Pulmonary:      Effort: Pulmonary effort is normal.      Breath sounds: Normal breath sounds. No wheezing or rales.   Abdominal:      General: Bowel sounds are normal. There is no distension.      Palpations: Abdomen is soft.      Tenderness: There is no abdominal tenderness.   Musculoskeletal:         General: No tenderness or deformity. Normal range of motion.      Cervical back: Normal range of motion and neck supple.      Lumbar back: Normal. No spasms.      Comments: Bends 90 degrees at  waist, shoulders and knees good range of motion, no pitting edema to lower extremities.   Lymphadenopathy:      Cervical: No cervical adenopathy.   Skin:     General: Skin is warm and dry.      Findings: No rash.   Neurological:      Mental Status: He is alert and oriented to person, place, and time.      Cranial Nerves: No cranial nerve deficit.      Coordination: Coordination normal.      Comments: Balance appears good today   Psychiatric:         Behavior: Behavior normal.         Thought Content: Thought content normal.         Judgment: Judgment normal.           Assessment:       1. Orthostatic hypotension    2. Primary insomnia    3. Anxiety    4. Mixed hyperlipidemia    5. Mild cognitive impairment    6. Conductive hearing loss, bilateral    7. Atherosclerosis of native coronary artery of native heart without angina pectoris    8. Essential hypertension    9. H/O heart artery stent    10. Sick sinus syndrome         Plan:       Orthostatic hypotension  -     CBC Auto Differential; Future; Expected date: 05/02/2022  -     Comprehensive Metabolic Panel; Future; Expected date: 05/02/2022  -     Lipid Panel; Future; Expected date: 05/02/2022  -     TSH w/reflex to FT4; Future; Expected date: 05/02/2022  -     Urinalysis, Reflex to Urine Culture Urine, Clean Catch; Future; Expected  date: 05/02/2022  Increase salt in his diet, goal is to keep systolic blood pressure above 100. May need midrodrine 2.5 mg in the future get labs drawn this week to re-evaluate his medical status   Primary insomnia      Anxiety      Mixed hyperlipidemia  -     CBC Auto Differential; Future; Expected date: 05/02/2022  -     Comprehensive Metabolic Panel; Future; Expected date: 05/02/2022  -     Lipid Panel; Future; Expected date: 05/02/2022  -     TSH w/reflex to FT4; Future; Expected date: 05/02/2022  -     Urinalysis, Reflex to Urine Culture Urine, Clean Catch; Future; Expected date: 05/02/2022    Mild cognitive impairment  Short-term memory is suffering  Conductive hearing loss, bilateral  Continue hearing aids  Atherosclerosis of native coronary artery of native heart without angina pectoris    Essential hypertension  Blood pressure running low lately  H/O heart artery stent    Sick sinus syndrome      No follow-ups on file.        5/3/2022 Jeyson Waldron

## 2022-05-08 NOTE — PROGRESS NOTES
Call patient's daughter.  CBC shows stable anemia with hematocrit of 37.1.  Sugar kidneys liver and thyroid are all normal.  Cholesterol excellent at 118. Urinalysis shows no infection.  Continue  current medications

## 2022-05-09 ENCOUNTER — TELEPHONE (OUTPATIENT)
Dept: FAMILY MEDICINE | Facility: CLINIC | Age: 82
End: 2022-05-09

## 2022-05-09 NOTE — TELEPHONE ENCOUNTER
----- Message from Jeyson Waldron MD sent at 5/7/2022  9:05 PM CDT -----  Call patient's daughter.  CBC shows stable anemia with hematocrit of 37.1.  Sugar kidneys liver and thyroid are all normal.  Cholesterol excellent at 118. Urinalysis shows no infection.  Continue  current medications

## 2022-05-25 ENCOUNTER — PATIENT MESSAGE (OUTPATIENT)
Dept: CARDIOLOGY | Facility: CLINIC | Age: 82
End: 2022-05-25
Payer: MEDICARE

## 2022-06-06 ENCOUNTER — TELEPHONE (OUTPATIENT)
Dept: CARDIOLOGY | Facility: CLINIC | Age: 82
End: 2022-06-06
Payer: MEDICARE

## 2022-06-06 NOTE — TELEPHONE ENCOUNTER
----- Message from Breonna Dong sent at 6/6/2022 12:32 PM CDT -----  Contact: pt  Type: Needs Medical Advice    Who: Called: Pt daughter     Best Call Back Number: [phone]    Inquiry/Question: Pt needs a 6 month follow up for around October 13 please call to schedule  Thank you~

## 2022-06-07 ENCOUNTER — IMMUNIZATION (OUTPATIENT)
Dept: PRIMARY CARE CLINIC | Facility: CLINIC | Age: 82
End: 2022-06-07
Payer: MEDICARE

## 2022-06-07 DIAGNOSIS — Z23 NEED FOR VACCINATION: Primary | ICD-10-CM

## 2022-06-07 PROCEDURE — 0054A PR IMMUNIZ ADMIN, SARS-COV-2 COVID-19 VACC, TRI SUCROSE, 30MCG/0.3ML, BOOSTER DOSE: ICD-10-PCS | Mod: S$GLB,,, | Performed by: FAMILY MEDICINE

## 2022-06-07 PROCEDURE — 0054A PR IMMUNIZ ADMIN, SARS-COV-2 COVID-19 VACC, TRI SUCROSE, 30MCG/0.3ML, BOOSTER DOSE: CPT | Mod: S$GLB,,, | Performed by: FAMILY MEDICINE

## 2022-06-07 PROCEDURE — 91305 COVID-19, MRNA, LNP-S, PF, 30 MCG/0.3 ML DOSE VACCINE (PFIZER): CPT | Mod: S$GLB,,, | Performed by: FAMILY MEDICINE

## 2022-06-07 PROCEDURE — 91305 COVID-19, MRNA, LNP-S, PF, 30 MCG/0.3 ML DOSE VACCINE (PFIZER): ICD-10-PCS | Mod: S$GLB,,, | Performed by: FAMILY MEDICINE

## 2022-06-10 ENCOUNTER — TELEPHONE (OUTPATIENT)
Dept: FAMILY MEDICINE | Facility: CLINIC | Age: 82
End: 2022-06-10

## 2022-06-10 NOTE — TELEPHONE ENCOUNTER
Kidneys are satisfactory. He could just get naproxen 250mg otc. Can take every 4-6 hours as needed for back pain over the next couple days. Do not want chronic use of this though

## 2022-06-10 NOTE — TELEPHONE ENCOUNTER
----- Message from Merced Rose sent at 6/10/2022  9:00 AM CDT -----  Patient daughter (Taylor)called and stated that the patient hurt his back while trying to help his wife into the house yesterday and she would like to know if the doctor can call in something like a naproxen if any questions please give her a call at [phone]

## 2022-06-13 ENCOUNTER — OFFICE VISIT (OUTPATIENT)
Dept: SPINE | Facility: CLINIC | Age: 82
End: 2022-06-13
Payer: MEDICARE

## 2022-06-13 ENCOUNTER — HOSPITAL ENCOUNTER (OUTPATIENT)
Dept: RADIOLOGY | Facility: HOSPITAL | Age: 82
Discharge: HOME | End: 2022-06-13
Attending: PHYSICAL MEDICINE & REHABILITATION
Payer: MEDICARE

## 2022-06-13 ENCOUNTER — PATIENT MESSAGE (OUTPATIENT)
Dept: SPINE | Facility: CLINIC | Age: 82
End: 2022-06-13

## 2022-06-13 ENCOUNTER — TELEPHONE (OUTPATIENT)
Dept: REHABILITATION | Facility: HOSPITAL | Age: 82
End: 2022-06-13
Payer: MEDICARE

## 2022-06-13 VITALS — BODY MASS INDEX: 21.19 KG/M2 | HEIGHT: 67 IN | WEIGHT: 135 LBS

## 2022-06-13 DIAGNOSIS — M54.42 ACUTE LEFT-SIDED LOW BACK PAIN WITH LEFT-SIDED SCIATICA: ICD-10-CM

## 2022-06-13 DIAGNOSIS — M54.42 ACUTE LEFT-SIDED LOW BACK PAIN WITH LEFT-SIDED SCIATICA: Primary | ICD-10-CM

## 2022-06-13 DIAGNOSIS — K64.9 HEMORRHOIDS, UNSPECIFIED HEMORRHOID TYPE: ICD-10-CM

## 2022-06-13 PROCEDURE — 72100 X-RAY EXAM L-S SPINE 2/3 VWS: CPT | Mod: 26,,, | Performed by: RADIOLOGY

## 2022-06-13 PROCEDURE — 96372 PR INJECTION,THERAP/PROPH/DIAG2ST, IM OR SUBCUT: ICD-10-PCS | Mod: S$GLB,,, | Performed by: PHYSICAL MEDICINE & REHABILITATION

## 2022-06-13 PROCEDURE — 72100 XR LUMBAR SPINE AP AND LATERAL: ICD-10-PCS | Mod: 26,,, | Performed by: RADIOLOGY

## 2022-06-13 PROCEDURE — 1125F PR PAIN SEVERITY QUANTIFIED, PAIN PRESENT: ICD-10-PCS | Mod: CPTII,S$GLB,, | Performed by: PHYSICAL MEDICINE & REHABILITATION

## 2022-06-13 PROCEDURE — 1101F PR PT FALLS ASSESS DOC 0-1 FALLS W/OUT INJ PAST YR: ICD-10-PCS | Mod: CPTII,S$GLB,, | Performed by: PHYSICAL MEDICINE & REHABILITATION

## 2022-06-13 PROCEDURE — 1160F PR REVIEW ALL MEDS BY PRESCRIBER/CLIN PHARMACIST DOCUMENTED: ICD-10-PCS | Mod: CPTII,S$GLB,, | Performed by: PHYSICAL MEDICINE & REHABILITATION

## 2022-06-13 PROCEDURE — 3288F FALL RISK ASSESSMENT DOCD: CPT | Mod: CPTII,S$GLB,, | Performed by: PHYSICAL MEDICINE & REHABILITATION

## 2022-06-13 PROCEDURE — 72100 X-RAY EXAM L-S SPINE 2/3 VWS: CPT | Mod: TC,FY

## 2022-06-13 PROCEDURE — 1160F RVW MEDS BY RX/DR IN RCRD: CPT | Mod: CPTII,S$GLB,, | Performed by: PHYSICAL MEDICINE & REHABILITATION

## 2022-06-13 PROCEDURE — 96372 THER/PROPH/DIAG INJ SC/IM: CPT | Mod: S$GLB,,, | Performed by: PHYSICAL MEDICINE & REHABILITATION

## 2022-06-13 PROCEDURE — 3288F PR FALLS RISK ASSESSMENT DOCUMENTED: ICD-10-PCS | Mod: CPTII,S$GLB,, | Performed by: PHYSICAL MEDICINE & REHABILITATION

## 2022-06-13 PROCEDURE — 99213 PR OFFICE/OUTPT VISIT, EST, LEVL III, 20-29 MIN: ICD-10-PCS | Mod: 25,S$GLB,, | Performed by: PHYSICAL MEDICINE & REHABILITATION

## 2022-06-13 PROCEDURE — 1159F PR MEDICATION LIST DOCUMENTED IN MEDICAL RECORD: ICD-10-PCS | Mod: CPTII,S$GLB,, | Performed by: PHYSICAL MEDICINE & REHABILITATION

## 2022-06-13 PROCEDURE — 1159F MED LIST DOCD IN RCRD: CPT | Mod: CPTII,S$GLB,, | Performed by: PHYSICAL MEDICINE & REHABILITATION

## 2022-06-13 PROCEDURE — 1125F AMNT PAIN NOTED PAIN PRSNT: CPT | Mod: CPTII,S$GLB,, | Performed by: PHYSICAL MEDICINE & REHABILITATION

## 2022-06-13 PROCEDURE — 1101F PT FALLS ASSESS-DOCD LE1/YR: CPT | Mod: CPTII,S$GLB,, | Performed by: PHYSICAL MEDICINE & REHABILITATION

## 2022-06-13 PROCEDURE — 99213 OFFICE O/P EST LOW 20 MIN: CPT | Mod: 25,S$GLB,, | Performed by: PHYSICAL MEDICINE & REHABILITATION

## 2022-06-13 RX ORDER — MEMANTINE HYDROCHLORIDE 10 MG/1
10 TABLET ORAL 2 TIMES DAILY
COMMUNITY
Start: 2022-04-14 | End: 2022-06-28

## 2022-06-13 RX ORDER — METHOCARBAMOL 500 MG/1
500 TABLET, FILM COATED ORAL 2 TIMES DAILY PRN
Qty: 60 TABLET | Refills: 0 | Status: SHIPPED | OUTPATIENT
Start: 2022-06-13 | End: 2022-07-12 | Stop reason: SDUPTHER

## 2022-06-13 RX ORDER — METHYLPREDNISOLONE ACETATE 40 MG/ML
40 INJECTION, SUSPENSION INTRA-ARTICULAR; INTRALESIONAL; INTRAMUSCULAR; SOFT TISSUE
Status: COMPLETED | OUTPATIENT
Start: 2022-06-13 | End: 2022-06-13

## 2022-06-13 RX ORDER — KETOROLAC TROMETHAMINE 30 MG/ML
30 INJECTION, SOLUTION INTRAMUSCULAR; INTRAVENOUS ONCE
Status: COMPLETED | OUTPATIENT
Start: 2022-06-13 | End: 2022-06-13

## 2022-06-13 RX ORDER — TEMAZEPAM 7.5 MG/1
7.5 CAPSULE ORAL NIGHTLY
COMMUNITY
Start: 2022-05-12 | End: 2022-07-18

## 2022-06-13 RX ORDER — METHYLPREDNISOLONE 4 MG/1
TABLET ORAL
Qty: 1 EACH | Refills: 0 | Status: SHIPPED | OUTPATIENT
Start: 2022-06-13 | End: 2022-06-28

## 2022-06-13 RX ORDER — ESCITALOPRAM OXALATE 20 MG/1
20 TABLET ORAL DAILY
COMMUNITY
Start: 2022-06-08 | End: 2022-06-28 | Stop reason: SDUPTHER

## 2022-06-13 RX ADMIN — METHYLPREDNISOLONE ACETATE 40 MG: 40 INJECTION, SUSPENSION INTRA-ARTICULAR; INTRALESIONAL; INTRAMUSCULAR; SOFT TISSUE at 11:06

## 2022-06-13 RX ADMIN — KETOROLAC TROMETHAMINE 30 MG: 30 INJECTION, SOLUTION INTRAMUSCULAR; INTRAVENOUS at 11:06

## 2022-06-13 NOTE — PROGRESS NOTES
"  SUBJECTIVE:    Patient ID: Jarred Harrison is a 82 y.o. male.    Chief Complaint: Back Pain (low back pain)    This is an 78-year-old man I saw in 2020 with complaints of left-sided low back pain.  He responded to Depo-Medrol and Toradol followed by Medrol pack.  He presents today with recurrent left-sided low back pain radiating into the left calf.  He says he was trying to assist his wife a few days ago and injured his back.  He did not fall.  He says this is familiar pain but more intense than in the past.  Bowel and bladder are intact.  He has tried some Robaxin for pain which does help.  His pain level is 8/10.  He has been constipated and says he has a hemorrhoid.        Past Medical History:   Diagnosis Date    AI (aortic insufficiency)     Coronary artery disease     GERD (gastroesophageal reflux disease)     Hyperlipidemia     Hypertension     LBBB (left bundle branch block)     SSS (sick sinus syndrome)      Social History     Socioeconomic History    Marital status:    Tobacco Use    Smoking status: Never Smoker    Smokeless tobacco: Never Used   Substance and Sexual Activity    Alcohol use: Not Currently     Past Surgical History:   Procedure Laterality Date    CARDIAC CATHETERIZATION      CATARACT EXTRACTION      CORONARY ANGIOPLASTY  08/29/82015    CORONARY STENT PLACEMENT  2015    Dr Parr    FOOT SURGERY Right     KNEE SURGERY Left     right elbow       History reviewed. No pertinent family history.  Vitals:    06/13/22 1023   Weight: 61.2 kg (135 lb)   Height: 5' 7" (1.702 m)       Review of Systems   Constitutional: Negative for chills, diaphoresis, fatigue, fever and unexpected weight change.   HENT: Negative for trouble swallowing.    Eyes: Negative for visual disturbance.   Respiratory: Negative for shortness of breath.    Cardiovascular: Negative for chest pain.   Gastrointestinal: Negative for abdominal pain, constipation, diarrhea, nausea and vomiting. "   Genitourinary: Negative for difficulty urinating.   Musculoskeletal: Negative for arthralgias, back pain, gait problem, joint swelling, myalgias, neck pain and neck stiffness.   Neurological: Negative for dizziness, speech difficulty, weakness, light-headedness, numbness and headaches.          Objective:      Physical Exam  Neurological:      Mental Status: He is alert and oriented to person, place, and time.      Comments: He is awake and in no acute distress  Mild tenderness to palpation left lumbar paraspinous musculature with no palpable masses  Forward flexion is to about 60° before complains of pain on left at the lumbosacral junction.  Extension likewise causes pain on left at the lumbosacral junction  Deep tendon reflexes are +2 at both knees and +1 at both ankles  Strength is normal in both lower extremities  Straight leg raise on the left causes left-sided low back pain but no leg pain  HIMANSHU test on the left causes left-sided low back pain.  HIMANSHU test on the right is negative             Assessment:       1. Acute left-sided low back pain with left-sided sciatica    2. Hemorrhoids, unspecified hemorrhoid type           Plan:     his symptoms are most consistent with a lumbar strain versus exacerbation of underlying degenerative disc disease.  Regardless he can be treated conservatively.  We are going to give him a shot of Depo-Medrol and Toradol followed by Medrol Dosepak.  Refill Robaxin for pain.  I gave him a prescription for Proctofoam but recommend follow-up with primary care.  We will get some x-rays of the lumbar spine which I can report to him over the phone unless I see something worrisome.  We will follow-up with him by phone in about 1 week      Acute left-sided low back pain with left-sided sciatica    Hemorrhoids, unspecified hemorrhoid type    Other orders  -     methylPREDNISolone acetate injection 40 mg  -     ketorolac injection 30 mg  -     methylPREDNISolone (MEDROL DOSEPACK) 4 mg  tablet; use as directed  Dispense: 1 each; Refill: 0  -     methocarbamoL (ROBAXIN) 500 MG Tab; Take 1 tablet (500 mg total) by mouth 2 (two) times daily as needed (Pain).  Dispense: 60 tablet; Refill: 0  -     hydrocortisone-pramoxine (PROCTOFOAM-HS) rectal foam; Place 1 applicator rectally 2 (two) times daily.  Dispense: 1 applicator; Refill: 0

## 2022-06-15 ENCOUNTER — TELEPHONE (OUTPATIENT)
Dept: SPINE | Facility: CLINIC | Age: 82
End: 2022-06-15
Payer: MEDICARE

## 2022-06-15 NOTE — TELEPHONE ENCOUNTER
Left msg on machine that I am returning her call   Patient was last seen in office 7/28/2021 patient would like a prescription refill for Omeprazole.

## 2022-06-15 NOTE — TELEPHONE ENCOUNTER
----- Message from Stacey M Lefort sent at 6/15/2022  9:00 AM CDT -----  Daughter Snehal Cunningham would like a callback at [phone] - she said her dad is still in a great deal of pain. Thank you.

## 2022-06-16 ENCOUNTER — TELEPHONE (OUTPATIENT)
Dept: SPINE | Facility: CLINIC | Age: 82
End: 2022-06-16
Payer: MEDICARE

## 2022-06-16 ENCOUNTER — PATIENT MESSAGE (OUTPATIENT)
Dept: SPINE | Facility: CLINIC | Age: 82
End: 2022-06-16
Payer: MEDICARE

## 2022-06-16 DIAGNOSIS — M54.42 ACUTE LEFT-SIDED LOW BACK PAIN WITH LEFT-SIDED SCIATICA: Primary | ICD-10-CM

## 2022-06-16 DIAGNOSIS — M54.9 DORSALGIA, UNSPECIFIED: ICD-10-CM

## 2022-06-16 DIAGNOSIS — M54.42 ACUTE LEFT-SIDED LOW BACK PAIN WITH LEFT-SIDED SCIATICA: ICD-10-CM

## 2022-06-16 RX ORDER — TRAMADOL HYDROCHLORIDE 50 MG/1
50 TABLET ORAL EVERY 6 HOURS PRN
Qty: 28 TABLET | Refills: 0 | OUTPATIENT
Start: 2022-06-16 | End: 2022-07-02

## 2022-06-16 RX ORDER — GABAPENTIN 100 MG/1
100 CAPSULE ORAL NIGHTLY
Qty: 30 CAPSULE | Refills: 1 | Status: SHIPPED | OUTPATIENT
Start: 2022-06-16 | End: 2022-08-18

## 2022-06-16 RX ORDER — TRAMADOL HYDROCHLORIDE 50 MG/1
50 TABLET ORAL EVERY 6 HOURS PRN
Qty: 28 TABLET | Refills: 0 | Status: SHIPPED | OUTPATIENT
Start: 2022-06-16 | End: 2022-06-16 | Stop reason: SDUPTHER

## 2022-06-16 NOTE — TELEPHONE ENCOUNTER
Noted.  Since his MRI is scheduled so far out would he like to try course of physical therapy in the meantime?  Also please make sure he is taking an anti-inflammatory medication

## 2022-06-16 NOTE — TELEPHONE ENCOUNTER
Returned call to patient's daughter, Radha to advise per provider: I am going to send in a low dose of gabapentin he can take at night.  I am ordering an MRI of the lumbar spine as well.  Follow-up with me after the scan.  Since he is in severe pain he may benefit from an epidural steroid injection. Radha reports that they will more than likely not give him the gabapentin as he is sensitive to medications. Scheduled for MRI 7/6 and f/u to review in office 7/7. Verbalizes understanding.

## 2022-06-16 NOTE — TELEPHONE ENCOUNTER
Spoke with patient's daughter advised that Dr Mccann would probably not change too much until he finishes his medrol dose arin. She said she will make sure he is taking naproxen as well. If he is still in pain once he finishes the medrol arin they will call back.

## 2022-06-16 NOTE — TELEPHONE ENCOUNTER
Returned call to patient's daughter, Snehal who reports she had taken care of scheduling PT and now asking for tramadol as the family does not want him to take gabapentin as already prescribed. Advised would forward to provider for revew and response. Verbalizes understanding.

## 2022-06-16 NOTE — TELEPHONE ENCOUNTER
Prescription sent.  Accidentally sent the 1st prescription to mail order.  I resubmitted it to the local pharmacy

## 2022-06-16 NOTE — TELEPHONE ENCOUNTER
Spoke with patient daughter, Radha who is agreeable to trying PT. Advused once referral placed PT dept would contact to schedule. Verbalizes understanding.

## 2022-06-16 NOTE — TELEPHONE ENCOUNTER
----- Message from Stacey M Lefort sent at 6/16/2022  9:20 AM CDT -----  Daughter Taylor is requesting a callback at [phone].  Thank you.

## 2022-06-16 NOTE — TELEPHONE ENCOUNTER
----- Message from Stacey M Lefort sent at 6/16/2022  3:17 PM CDT -----  Daughter Snehal Cunningham is requesting a callback at [phone]. She has issues as to when his PT will begin. She can be reached at [phone].  Thank you.

## 2022-06-16 NOTE — TELEPHONE ENCOUNTER
I am going to send in a low dose of gabapentin he can take at night.  I am ordering an MRI of the lumbar spine as well.  Follow-up with me after the scan.  Since he is in severe pain he may benefit from an epidural steroid injection

## 2022-06-27 ENCOUNTER — CLINICAL SUPPORT (OUTPATIENT)
Dept: REHABILITATION | Facility: HOSPITAL | Age: 82
End: 2022-06-27
Attending: PHYSICAL MEDICINE & REHABILITATION
Payer: MEDICARE

## 2022-06-27 DIAGNOSIS — M54.42 ACUTE LEFT-SIDED LOW BACK PAIN WITH LEFT-SIDED SCIATICA: ICD-10-CM

## 2022-06-27 DIAGNOSIS — M62.89 MUSCLE TIGHTNESS: ICD-10-CM

## 2022-06-27 DIAGNOSIS — R53.1 DECREASED STRENGTH: ICD-10-CM

## 2022-06-27 DIAGNOSIS — R26.89 DECREASED FUNCTIONAL MOBILITY: ICD-10-CM

## 2022-06-27 DIAGNOSIS — M25.60 DECREASED RANGE OF MOTION: ICD-10-CM

## 2022-06-27 PROCEDURE — 97161 PT EVAL LOW COMPLEX 20 MIN: CPT | Mod: PN

## 2022-06-27 PROCEDURE — 97110 THERAPEUTIC EXERCISES: CPT | Mod: PN

## 2022-06-27 NOTE — PLAN OF CARE
[facility]  Physical Therapy Initial Evaluation    Name: Jarred Harrison  [facility] Number: [id number]    Therapy Diagnosis:   Encounter Diagnoses   Name Primary?    Acute left-sided low back pain with left-sided sciatica     Muscle tightness     Decreased strength     Decreased functional mobility     Decreased range of motion      Physician: Jeyson Mccann MD   Physician Orders: PT Eval and Treat  Medical Diagnosis from Referral: M54.42 (ICD-10-CM) - Acute left-sided low back pain with left-sided sciatica   Evaluation Date: 6/27/2022  Authorization Period Expiration: 12/31/2022   Plan of Care Expiration: 9/30/2022    Progress Update: 7/27/2022  FOTO: 1 / 3   Visit # / Visits authorized: 1 / 1       PRECAUTIONS: Standard Precautions     Time In: 1515  Time Out: 1600  Total Appointment Time (timed & untimed codes): 45 minutes    SUBJECTIVE     Chief complaint:   P1:  L sided low back pain    P2:   Radicular pain down posterior thigh/leg to medial portion of calf    History of current condition:  P1, P2:  Pain started about 3 weeks ago.  States that he attempted to catch his wife from falling.  States that he felt a sharp pain at P1.  States that he has experienced P2 several times per day since onset.   P2 usually lasts about 10-15 mins prior to relief.  Pain intensity and frequency has improved since onset.  States that he is now able to walk without an increase in pain.  Numbness/tingling occurs about 10-15 mins and has happened less than 1x per day.  States that he has had back pain for the past couple of years.      Previous episode:  none    Aggravating Factors:  Sit to stand, bending over  Easing Factors:  Bringing knees to chest    Imaging:      FINDINGS:  There is no acute abnormality or definite change in the appearance of the lumbar spine compared to the prior study.  There is no acute fracture.  Alignment is grossly normal.  There is multilevel disc space narrowing and  facet joint arthropathy.  There is mild multilevel endplate osteophyte formation.    Prior Therapy: Yes  Social History: Pt lives with their family  Occupation: Pt is retired.    Prior Level of Function: Independent with all ADLs  Current Level of Function: 75% of PLOF    Pain:  Current 8 /10, worst 9 /10, best 6 /10   Description: Dull, throbbing    Pts goals: Pt reported goal is to be able to walk without pain.    _______________________________________________________  Medical History:   Past Medical History:   Diagnosis Date    AI (aortic insufficiency)     Coronary artery disease     GERD (gastroesophageal reflux disease)     Hyperlipidemia     Hypertension     LBBB (left bundle branch block)     SSS (sick sinus syndrome)        Surgical History:   Jarred Harrison  has a past surgical history that includes Coronary angioplasty (08/29/82015); right elbow; Knee surgery (Left); Cataract extraction; Foot surgery (Right); Cardiac catheterization; and Coronary stent placement (2015).    Medications:   Jarred has a current medication list which includes the following prescription(s): ascorbic acid (vitamin c), aspirin, atorvastatin, cetirizine, coenzyme q10, escitalopram oxalate, escitalopram oxalate, ezetimibe, fluzone highdose quad 20-21 pf, gabapentin, glucosamine/chondroitin/c/pooja, glucosamine-chondroitin, hydrocortisone-pramoxine, hyoscyamine, latanoprost, memantine, methylprednisolone, metoprolol succinate, multivitamin, nitroglycerin, pantoprazole, temazepam, tramadol, trazodone, and vitamin d.    Allergies:   Review of patient's allergies indicates:   Allergen Reactions    Hydrocodone         OBJECTIVE   (x = not tested due to pain and/or inability to obtain test position)    RANGE OF MOTION:    Lumbar AROM/PROM Right  (spine)  6/27/2022 Left    6/27/2022 Goal   Lumbar Flexion (60) wfl c pain --- 55     Lumbar Extension (30) wfl c pain --- 30     Lumbar Side Bending (25) wfl c pain wfl c pain 20      Lumbar Rotation wfl c pain wfl c pain 45         Hip AROM/PROM Right  6/27/2022 Left  6/27/2022 Goal   Hip Flexion (120) wfl wfl c pain 115     Hip IR (45) 38 36 c pain 40     Hip ER (45) wfl wfl 40         Knee AROM/PROM Right  6/27/2022 Left  6/27/2022 Goal   Hyper - Zero - Flexion wfl wfl 0-0-135         STRENGTH:    L/E MMT Right  6/27/2022 Goal   Hip Flexion  4-/5 5/5 B    Hip Extension  4-/5 5/5 B   Hip Abduction  4-/5 5/5 B   Hip IR 4-/5 5/5 B   Hip ER 4-/5 5/5 B   Knee Flexion 4-/5 5/5 B   Knee Extension 4-/5 5/5 B   Ankle DF 4-/5 5/5 B   Ankle PF 4-/5 5/5 B   Ankle Inversion 4-/5 5/5 B   Ankle Eversion 4-/5 5/5 B   Big Toe Extension 4-/5 5/5 B     L/E MMT Left  6/27/2022 Goal   Hip Flexion  4-/5 c pain 5/5 B    Hip Extension  NT 5/5 B   Hip Abduction  4-/5 5/5 B   Hip IR 4-/5 5/5 B   Hip ER 4-/5 5/5 B   Knee Flexion 4-/5 c pain 5/5 B   Knee Extension 4-/5 5/5 B   Ankle DF 4-/5 5/5 B   Ankle PF 4-/5 5/5 B   Ankle Inversion 4-/5 5/5 B   Ankle Eversion 4-/5 5/5 B   Big Toe Extension 4-/5 5/5 B       MUSCLE LENGTH:     Muscle Tested  Right  6/27/2022 Left   6/27/2022 Goal   Hip Flexors  decreased decreased Normal B   Hamstrings  decreased decreased Normal B   Piriformis  decreased decreased Normal B   Gastrocnemius  decreased decreased Normal B   Soleus  decreased decreased Normal B       SPECIAL TESTS:     Right  (spine)  6/27/2022 Left Goal   SLR Negative Positive Negative    Crossover STRAIGHT LEG RAISE  Negative   Slump Negative Negative Negative    90/90 Positive Positive Negative   Riki Positive Positive Negative   Standing forward flexion test Negative Negative Negative   Sacral thrust Negative Negative Negative   SIJ Distraction Negative Negative Negative   SIJ Compression Negative Negative Negative       Sensation:  Sensation is intact to light touch    Palpation:  TTP from L3-L5 spinous processes.      Posture:  Pt presents with postural abnormalities which include: forward head and rounded  shoulders    Gait Analysis: The patient ambulated with the following assistive device: none; the pt presents with the following gait abnormalities: decreased step length, jian, and arm swing; increased forward flexed posture, trunk sway     Movement Analysis:   Functional Test  Outcome   Double Leg Squat NT   Single Leg Step Down NT           TREATMENT   Total Treatment time separate from Evaluation: (10) minutes    Jarred received therapeutic exercises to develop strength, endurance, ROM, flexibility, and posture for (10) minutes including: x = exercises performed     TherEx 6/27/2022    Seated piriformis stretch x 3x30 secs   LTRs x 3x10   Supine knees to chest x 3x10          Plan for Next Visit:     Recumbent bike x 5 mins  LTRs  3x10  Supine knees to chest with PB  3x10  Bridges  2x10  Clams with yellow tb  2x10  Seated PB rollout  2x10  Seated piriformis stretch  3x30 secs B       Home Exercises and Patient Education Provided    Education/Self-Care provided:   Patient educated on the impairments noted above and the effects of physical therapy intervention to improve overall condition and quality of life.    Patient was educated on all the above exercise prior/during/after for proper posture, positioning, and execution for safe performance with home exercise program.     Written Home Exercises Provided: yes. Prefers: Electronic Copies  Exercises were reviewed and Jarred was able to demonstrate them prior to the end of the session.  Jarred demonstrated good understanding of the education provided.     See EMR under Patient Instructions for exercises provided during therapy sessions.    ASSESSMENT   Jarred is a 82 y.o. male referred to outpatient Physical Therapy with a medical diagnosis of M54.42 (ICD-10-CM) - Acute left-sided low back pain with left-sided sciatica   . Jarred presents with clinical signs and symptoms that support this diagnosis with     decreased Lumbar ROM, decreased lower extremity  "strength, Lumbar joint(s) hypomobility, lower extremity neural tension, and impaired functional mobility. Radicular symptoms are present from the lumbar spine down into the     The above impairments will be addressed through manual therapy techniques, therapeutic exercises, functional training, and modalities as necessary. Patient was treated and educated on exercises for home program, progression of therapy, and benefits of therapy to achieve full functional mobility. Patient will benefit from skilled physical therapy to meet short and long term goals and return to prior level of function.    Pt prognosis is Good.   Pt will benefit from skilled outpatient Physical Therapy to address the deficits stated above and in the chart below, provide pt/family education, and to maximize pt's level of independence.     Plan of care discussed with patient: Yes  Pt's spiritual, cultural and educational needs considered and patient is agreeable to the plan of care and goals as stated below:     Anticipated Barriers for therapy: none    Medical Necessity is demonstrated by the following:   History  Co-morbidities and personal factors that may impact the plan of care Co-morbidities:   advanced age    Personal Factors:   no deficits     low   Examination  Body Structures and Functions, activity limitations and participation restrictions that may impact the plan of care Body Regions:   back    Body Systems:    gross symmetry  ROM  strength  gross coordinated movement  balance  gait  motor control  motor learning    Participation Restrictions:   See above in "Current Level of Function"     Activity limitations:   Learning and applying knowledge  no deficits    General Tasks and Commands  no deficits    Communication  no deficits    Mobility  no deficits    Self care  no deficits    Domestic Life  no deficits    Interactions/Relationships  no deficits    Life Areas  no deficits    Community and Social Life  community life  recreation " and leisure  no deficits         low   Clinical Presentation stable and uncomplicated low   Decision Making/ Complexity Score: low       GOALS:  SHORT TERM GOALS: 4 weeks 6/27/2022   1. Recent signs and systems trend is improving in order to progress towards LTG's.    2. Patient will be independent with HEP in order to further progress and return to maximal function.    3. Pain rating at Worst: 5/10 in order to progress towards increased independence with activity.    4. Patient will be able to correct postural deviations in sitting and standing, to decrease pain and promote postural awareness for injury prevention.       LONG TERM GOALS: 8 weeks 6/27/2022   1. Patient will return to normal ADL, recreational, and work related activities with less pain and limitation.     2. Patient will improve AROM to stated goals in order to return to maximal functional potential.     3. Patient will improve Strength to stated goals of appropriate musculature in order to improve functional independence.     4. Pain Rating at Best: 1/10 to improve Quality of Life.     5. Patient will meet predicted functional outcome (FOTO) score: 60% to increase self-worth & perceived functional ability.    6. Patient will have met/partially met personal goal of being able to walk with no pain.          PLAN   Plan of care Certification: 6/27/2022 to 9/30/2022    Outpatient Physical Therapy 2 times weekly for 6 weeks to include any combination of the following interventions: virtual visits, dry needling, modalities, electrical stimulation (IFC, Pre-Mod, Attended with Functional Dry Needling), Manual Therapy, Moist Heat/ Ice, Neuromuscular Re-ed, Patient Education, Self Care, Therapeutic Exercise, Functional Training and Therapeutic Activites     Thank you for this referral.    These services are reasonable and necessary for the conditions set forth above while under my care.    Aayush Pate, PT, DPT

## 2022-06-28 ENCOUNTER — OFFICE VISIT (OUTPATIENT)
Dept: FAMILY MEDICINE | Facility: CLINIC | Age: 82
End: 2022-06-28
Payer: MEDICARE

## 2022-06-28 VITALS
HEIGHT: 67 IN | BODY MASS INDEX: 21.19 KG/M2 | OXYGEN SATURATION: 98 % | RESPIRATION RATE: 19 BRPM | WEIGHT: 135 LBS | SYSTOLIC BLOOD PRESSURE: 98 MMHG | HEART RATE: 72 BPM | DIASTOLIC BLOOD PRESSURE: 60 MMHG

## 2022-06-28 DIAGNOSIS — G31.84 MILD COGNITIVE IMPAIRMENT: ICD-10-CM

## 2022-06-28 DIAGNOSIS — F41.9 ANXIETY: ICD-10-CM

## 2022-06-28 DIAGNOSIS — E78.2 MIXED HYPERLIPIDEMIA: ICD-10-CM

## 2022-06-28 DIAGNOSIS — I10 ESSENTIAL HYPERTENSION: ICD-10-CM

## 2022-06-28 DIAGNOSIS — I95.1 ORTHOSTATIC HYPOTENSION: ICD-10-CM

## 2022-06-28 DIAGNOSIS — K21.9 GASTRO-ESOPHAGEAL REFLUX DISEASE WITHOUT ESOPHAGITIS: ICD-10-CM

## 2022-06-28 DIAGNOSIS — M50.30 DDD (DEGENERATIVE DISC DISEASE), CERVICAL: ICD-10-CM

## 2022-06-28 DIAGNOSIS — I95.0 IDIOPATHIC HYPOTENSION: Primary | ICD-10-CM

## 2022-06-28 PROCEDURE — 1125F AMNT PAIN NOTED PAIN PRSNT: CPT | Mod: CPTII,S$GLB,, | Performed by: FAMILY MEDICINE

## 2022-06-28 PROCEDURE — 1125F PR PAIN SEVERITY QUANTIFIED, PAIN PRESENT: ICD-10-PCS | Mod: CPTII,S$GLB,, | Performed by: FAMILY MEDICINE

## 2022-06-28 PROCEDURE — 3074F PR MOST RECENT SYSTOLIC BLOOD PRESSURE < 130 MM HG: ICD-10-PCS | Mod: CPTII,S$GLB,, | Performed by: FAMILY MEDICINE

## 2022-06-28 PROCEDURE — 1159F PR MEDICATION LIST DOCUMENTED IN MEDICAL RECORD: ICD-10-PCS | Mod: CPTII,S$GLB,, | Performed by: FAMILY MEDICINE

## 2022-06-28 PROCEDURE — 1159F MED LIST DOCD IN RCRD: CPT | Mod: CPTII,S$GLB,, | Performed by: FAMILY MEDICINE

## 2022-06-28 PROCEDURE — 3078F PR MOST RECENT DIASTOLIC BLOOD PRESSURE < 80 MM HG: ICD-10-PCS | Mod: CPTII,S$GLB,, | Performed by: FAMILY MEDICINE

## 2022-06-28 PROCEDURE — 3288F FALL RISK ASSESSMENT DOCD: CPT | Mod: CPTII,S$GLB,, | Performed by: FAMILY MEDICINE

## 2022-06-28 PROCEDURE — 1100F PR PT FALLS ASSESS DOC 2+ FALLS/FALL W/INJURY/YR: ICD-10-PCS | Mod: CPTII,S$GLB,, | Performed by: FAMILY MEDICINE

## 2022-06-28 PROCEDURE — 99214 OFFICE O/P EST MOD 30 MIN: CPT | Mod: S$GLB,,, | Performed by: FAMILY MEDICINE

## 2022-06-28 PROCEDURE — 3288F PR FALLS RISK ASSESSMENT DOCUMENTED: ICD-10-PCS | Mod: CPTII,S$GLB,, | Performed by: FAMILY MEDICINE

## 2022-06-28 PROCEDURE — 3078F DIAST BP <80 MM HG: CPT | Mod: CPTII,S$GLB,, | Performed by: FAMILY MEDICINE

## 2022-06-28 PROCEDURE — 3074F SYST BP LT 130 MM HG: CPT | Mod: CPTII,S$GLB,, | Performed by: FAMILY MEDICINE

## 2022-06-28 PROCEDURE — 99214 PR OFFICE/OUTPT VISIT, EST, LEVL IV, 30-39 MIN: ICD-10-PCS | Mod: S$GLB,,, | Performed by: FAMILY MEDICINE

## 2022-06-28 PROCEDURE — 1100F PTFALLS ASSESS-DOCD GE2>/YR: CPT | Mod: CPTII,S$GLB,, | Performed by: FAMILY MEDICINE

## 2022-06-28 RX ORDER — HYOSCYAMINE SULFATE 0.125 MG
125 TABLET ORAL 2 TIMES DAILY
Status: CANCELLED | OUTPATIENT
Start: 2022-06-28

## 2022-06-28 RX ORDER — EZETIMIBE 10 MG/1
10 TABLET ORAL DAILY
Qty: 90 TABLET | Refills: 3 | Status: SHIPPED | OUTPATIENT
Start: 2022-06-28 | End: 2023-06-28

## 2022-06-28 RX ORDER — TEMAZEPAM 7.5 MG/1
7.5 CAPSULE ORAL NIGHTLY
Status: CANCELLED | OUTPATIENT
Start: 2022-06-28

## 2022-06-28 RX ORDER — PANTOPRAZOLE SODIUM 40 MG/1
40 TABLET, DELAYED RELEASE ORAL DAILY
Qty: 90 TABLET | Refills: 3 | Status: SHIPPED | OUTPATIENT
Start: 2022-06-28

## 2022-06-28 RX ORDER — MIDODRINE HYDROCHLORIDE 2.5 MG/1
2.5 TABLET ORAL 3 TIMES DAILY
Qty: 90 TABLET | Refills: 0 | Status: SHIPPED | OUTPATIENT
Start: 2022-06-28 | End: 2022-06-28

## 2022-06-28 RX ORDER — ESCITALOPRAM OXALATE 20 MG/1
20 TABLET ORAL DAILY
Qty: 90 TABLET | Refills: 3 | Status: ON HOLD | OUTPATIENT
Start: 2022-06-28 | End: 2022-12-06 | Stop reason: SDUPTHER

## 2022-06-28 RX ORDER — MIDODRINE HYDROCHLORIDE 2.5 MG/1
TABLET ORAL
Qty: 60 TABLET | Refills: 0 | Status: SHIPPED | OUTPATIENT
Start: 2022-06-28 | End: 2022-08-03 | Stop reason: SDUPTHER

## 2022-06-29 ENCOUNTER — PATIENT MESSAGE (OUTPATIENT)
Dept: FAMILY MEDICINE | Facility: CLINIC | Age: 82
End: 2022-06-29

## 2022-07-02 ENCOUNTER — HOSPITAL ENCOUNTER (EMERGENCY)
Facility: HOSPITAL | Age: 82
Discharge: HOME | End: 2022-07-02
Attending: EMERGENCY MEDICINE
Payer: MEDICARE

## 2022-07-02 VITALS
HEIGHT: 67 IN | HEART RATE: 61 BPM | TEMPERATURE: 98 F | OXYGEN SATURATION: 98 % | WEIGHT: 135 LBS | BODY MASS INDEX: 21.19 KG/M2 | RESPIRATION RATE: 18 BRPM | SYSTOLIC BLOOD PRESSURE: 151 MMHG | DIASTOLIC BLOOD PRESSURE: 71 MMHG

## 2022-07-02 DIAGNOSIS — G89.29 CHRONIC LEFT-SIDED LOW BACK PAIN WITH LEFT-SIDED SCIATICA: Primary | ICD-10-CM

## 2022-07-02 DIAGNOSIS — M54.42 CHRONIC LEFT-SIDED LOW BACK PAIN WITH LEFT-SIDED SCIATICA: Primary | ICD-10-CM

## 2022-07-02 DIAGNOSIS — M54.16 LUMBAR RADICULOPATHY: ICD-10-CM

## 2022-07-02 PROBLEM — I95.1 ORTHOSTATIC HYPOTENSION: Status: ACTIVE | Noted: 2022-07-02

## 2022-07-02 PROCEDURE — 96372 THER/PROPH/DIAG INJ SC/IM: CPT | Performed by: NURSE PRACTITIONER

## 2022-07-02 PROCEDURE — 25000003 PHARM REV CODE 250: Performed by: NURSE PRACTITIONER

## 2022-07-02 PROCEDURE — 99284 EMERGENCY DEPT VISIT MOD MDM: CPT | Mod: 25

## 2022-07-02 PROCEDURE — 63600175 PHARM REV CODE 636 W HCPCS: Performed by: NURSE PRACTITIONER

## 2022-07-02 RX ORDER — METHYLPREDNISOLONE ACETATE 40 MG/ML
40 INJECTION, SUSPENSION INTRA-ARTICULAR; INTRALESIONAL; INTRAMUSCULAR; SOFT TISSUE ONCE
Status: COMPLETED | OUTPATIENT
Start: 2022-07-02 | End: 2022-07-02

## 2022-07-02 RX ORDER — TRAMADOL HYDROCHLORIDE 50 MG/1
50 TABLET ORAL
Status: COMPLETED | OUTPATIENT
Start: 2022-07-02 | End: 2022-07-02

## 2022-07-02 RX ORDER — METHOCARBAMOL 500 MG/1
1000 TABLET, FILM COATED ORAL
Status: COMPLETED | OUTPATIENT
Start: 2022-07-02 | End: 2022-07-02

## 2022-07-02 RX ORDER — TRAMADOL HYDROCHLORIDE 50 MG/1
50 TABLET ORAL EVERY 8 HOURS PRN
Qty: 15 TABLET | Refills: 0 | Status: SHIPPED | OUTPATIENT
Start: 2022-07-02 | End: 2022-07-15

## 2022-07-02 RX ADMIN — TRAMADOL HYDROCHLORIDE 50 MG: 50 TABLET, COATED ORAL at 05:07

## 2022-07-02 RX ADMIN — METHYLPREDNISOLONE ACETATE 40 MG: 40 INJECTION, SUSPENSION INTRA-ARTICULAR; INTRALESIONAL; INTRAMUSCULAR; INTRASYNOVIAL; SOFT TISSUE at 04:07

## 2022-07-02 RX ADMIN — METHOCARBAMOL 1000 MG: 500 TABLET ORAL at 05:07

## 2022-07-02 NOTE — ED NOTES
"PRIVATE ROOM. EVEN AND NON LABORED RESPIRATIONS.  AIRWAY CLEAR.  PULSES REGULAR.  < 3" CAPILLARY REFILL. SKIN WDI.  MAEW.  NON DISTENDED ABDOMEN. ALERT, ORIENTED AND AMBULATORY. JUAN DANIEL LE AT THIS TIME.  CALL LIGHT IN REACH.  "

## 2022-07-02 NOTE — FIRST PROVIDER EVALUATION
"Medical screening exam completed.  I have conducted a focused provider triage encounter, findings are as follows:    Brief history of present illness:  Being treated for back pain/possible hemorrhoids by Dr. Mccann. Reports pain has increased over the past few days. Pain is located in the left buttock and is different than typical hemorrhoid pain.     Vitals:    07/02/22 1448 07/02/22 1450   BP:  128/68   Pulse: 66    Resp: 18    Temp: 97.8 °F (36.6 °C)    TempSrc: Oral    SpO2:  97%   Weight: 61.2 kg (135 lb)    Height: 5' 7" (1.702 m)        Pertinent physical exam:  Tenderness to left buttock, ambulatory without assistance.   *HARD OF HEARING    Brief workup plan:  No imaging or testing necessary at this time. Will need full exam when roomed after he is undressed and able to lie on stretcher    Preliminary workup initiated; this workup will be continued and followed by the physician or advanced practice provider that is assigned to the patient when roomed.  "

## 2022-07-02 NOTE — DISCHARGE INSTRUCTIONS
Increase the methocarbomal (muscle relaxant) to 2 500 mg tablets every 12 hours as needed   Increase the tramadol (pain medication) to every 8 hours as needed for pain

## 2022-07-02 NOTE — PROGRESS NOTES
SUBJECTIVE:    Patient ID: Jarred Harrison is a 82 y.o. male.    Chief Complaint: Memory Loss, Fall, Dizziness, and Hypotension    This 82-year-old male fell 3 weeks ago while trying to catch his wife who was falling.  Together they crashed  to the floor and he has been having some left-sided pain and sciatica ever since.  He has undergone a L-spine MRI with Dr. Mccann.  He will be starting physical therapy soon currently is taking tramadol 50 mg b.i.d. and methocarbamol b.i.d..    He has been measuring his blood pressures at home and he has been having significant hypotension below 90 systolic quite frequently.  78/47 recently Dr. Arteaga was taken off of his memantine and temazepam.    Family notes the rapid decline in memory loss.  He gets more confusion after lunch and has difficulty making decisions.  He had neuropsych workup through Dr. Giron's office diagnosed with MCI with rapid memory loss.    Complains of constipation takes  MiraLax b.i.d.      Office Visit on 05/02/2022   Component Date Value Ref Range Status    WBC 05/03/2022 6.1  3.8 - 10.8 Thousand/uL Final    RBC 05/03/2022 3.78 (A) 4.20 - 5.80 Million/uL Final    Hemoglobin 05/03/2022 12.3 (A) 13.2 - 17.1 g/dL Final    Hematocrit 05/03/2022 37.3 (A) 38.5 - 50.0 % Final    MCV 05/03/2022 98.7  80.0 - 100.0 fL Final    MCH 05/03/2022 32.5  27.0 - 33.0 pg Final    MCHC 05/03/2022 33.0  32.0 - 36.0 g/dL Final    RDW 05/03/2022 11.9  11.0 - 15.0 % Final    Platelets 05/03/2022 170  140 - 400 Thousand/uL Final    MPV 05/03/2022 10.3  7.5 - 12.5 fL Final    Neutrophils, Abs 05/03/2022 4,008  1,500 - 7,800 cells/uL Final    Lymph # 05/03/2022 1,196  850 - 3,900 cells/uL Final    Mono # 05/03/2022 531  200 - 950 cells/uL Final    Eos # 05/03/2022 305  15 - 500 cells/uL Final    Baso # 05/03/2022 61  0 - 200 cells/uL Final    Neutrophils Relative 05/03/2022 65.7  % Final    Lymph % 05/03/2022 19.6  % Final    Mono % 05/03/2022 8.7  % Final     Eosinophil % 05/03/2022 5.0  % Final    Basophil % 05/03/2022 1.0  % Final    Glucose 05/03/2022 91  65 - 99 mg/dL Final    BUN 05/03/2022 20  7 - 25 mg/dL Final    Creatinine 05/03/2022 1.11  0.70 - 1.11 mg/dL Final    eGFR if non  05/03/2022 61  > OR = 60 mL/min/1.73m2 Final    eGFR if  05/03/2022 70  > OR = 60 mL/min/1.73m2 Final    BUN/Creatinine Ratio 05/03/2022 NOT APPLICABLE  6 - 22 (calc) Final    Sodium 05/03/2022 139  135 - 146 mmol/L Final    Potassium 05/03/2022 4.7  3.5 - 5.3 mmol/L Final    Chloride 05/03/2022 104  98 - 110 mmol/L Final    CO2 05/03/2022 29  20 - 32 mmol/L Final    Calcium 05/03/2022 10.1  8.6 - 10.3 mg/dL Final    Total Protein 05/03/2022 6.7  6.1 - 8.1 g/dL Final    Albumin 05/03/2022 4.2  3.6 - 5.1 g/dL Final    Globulin, Total 05/03/2022 2.5  1.9 - 3.7 g/dL (calc) Final    Albumin/Globulin Ratio 05/03/2022 1.7  1.0 - 2.5 (calc) Final    Total Bilirubin 05/03/2022 0.7  0.2 - 1.2 mg/dL Final    Alkaline Phosphatase 05/03/2022 49  35 - 144 U/L Final    AST 05/03/2022 26  10 - 35 U/L Final    ALT 05/03/2022 16  9 - 46 U/L Final    Cholesterol 05/03/2022 118  <200 mg/dL Final    HDL 05/03/2022 44  > OR = 40 mg/dL Final    Triglycerides 05/03/2022 56  <150 mg/dL Final    LDL Cholesterol 05/03/2022 60  mg/dL (calc) Final    HDL/Cholesterol Ratio 05/03/2022 2.7  <5.0 (calc) Final    Non HDL Chol. (LDL+VLDL) 05/03/2022 74  <130 mg/dL (calc) Final    TSH w/reflex to FT4 05/03/2022 3.96  0.40 - 4.50 mIU/L Final    Color, UA 05/03/2022 YELLOW  YELLOW Final    Appearance, UA 05/03/2022 CLEAR  CLEAR Final    Specific Gravity, UA 05/03/2022 1.014  1.001 - 1.035 Final    pH, UA 05/03/2022 7.0  5.0 - 8.0 Final    Glucose, UA 05/03/2022 NEGATIVE  NEGATIVE Final    Bilirubin, UA 05/03/2022 NEGATIVE  NEGATIVE Final    Ketones, UA 05/03/2022 NEGATIVE  NEGATIVE Final    Occult Blood UA 05/03/2022 NEGATIVE  NEGATIVE Final    Protein,  UA 05/03/2022 NEGATIVE  NEGATIVE Final    Nitrite, UA 05/03/2022 NEGATIVE  NEGATIVE Final    Leukocytes, UA 05/03/2022 NEGATIVE  NEGATIVE Final    WBC Casts, UA 05/03/2022 NONE SEEN  < OR = 5 /HPF Final    RBC Casts, UA 05/03/2022 0-2  < OR = 2 /HPF Final    Squam Epithel, UA 05/03/2022 NONE SEEN  < OR = 5 /HPF Final    Bacteria, UA 05/03/2022 NONE SEEN  NONE SEEN /HPF Final    Hyaline Casts, UA 05/03/2022 NONE SEEN  NONE SEEN /LPF Final    Reflexive Urine Culture 05/03/2022    Final       Past Medical History:   Diagnosis Date    AI (aortic insufficiency)     Coronary artery disease     GERD (gastroesophageal reflux disease)     Hyperlipidemia     Hypertension     LBBB (left bundle branch block)     SSS (sick sinus syndrome)      Social History     Socioeconomic History    Marital status:    Tobacco Use    Smoking status: Never Smoker    Smokeless tobacco: Never Used   Substance and Sexual Activity    Alcohol use: Not Currently     Past Surgical History:   Procedure Laterality Date    CARDIAC CATHETERIZATION      CATARACT EXTRACTION      CORONARY ANGIOPLASTY  08/29/82015    CORONARY STENT PLACEMENT  2015    Dr Parr    FOOT SURGERY Right     KNEE SURGERY Left     right elbow       History reviewed. No pertinent family history.    Review of patient's allergies indicates:   Allergen Reactions    Hydrocodone        Current Outpatient Medications:     ascorbic acid, vitamin C, (VITAMIN C) 500 MG tablet, Take 500 mg by mouth once daily., Disp: , Rfl:     aspirin (ECOTRIN) 81 MG EC tablet, Take 81 mg by mouth once daily., Disp: , Rfl:     cetirizine (ZYRTEC) 10 MG tablet, TK 1 T PO D PRF ALLERGIES/SINUS COG, Disp: , Rfl:     coenzyme Q10 100 mg capsule, Take 100 mg by mouth once daily., Disp: , Rfl:     gluc tellez/chondro tellez A/vit C/Mn (GLUCOSAMINE 1500 COMPLEX ORAL), Glucosamine   AND CHONDROITIN 1500, 1200 MG MULTIVITAMIN, Disp: , Rfl:     hyoscyamine (ANASPAZ,LEVSIN) 0.125 mg  Tab, Take 125 mcg by mouth 2 (two) times a day., Disp: , Rfl:     latanoprost 0.005 % ophthalmic solution, , Disp: , Rfl:     traMADoL (ULTRAM) 50 mg tablet, Take 1 tablet (50 mg total) by mouth every 6 (six) hours as needed for Pain., Disp: 28 tablet, Rfl: 0    vitamin D (VITAMIN D3) 1000 units Tab, Take 1,000 Units by mouth once daily., Disp: , Rfl:     atorvastatin (LIPITOR) 40 MG tablet, TAKE 1 TABLET BY MOUTH AT  BEDTIME (Patient not taking: No sig reported), Disp: 90 tablet, Rfl: 3    EScitalopram oxalate (LEXAPRO) 20 MG tablet, Take 1 tablet (20 mg total) by mouth once daily., Disp: 90 tablet, Rfl: 3    ezetimibe (ZETIA) 10 mg tablet, Take 1 tablet (10 mg total) by mouth once daily., Disp: 90 tablet, Rfl: 3    gabapentin (NEURONTIN) 100 MG capsule, Take 1 capsule (100 mg total) by mouth every evening., Disp: 30 capsule, Rfl: 1    glucosamine-chondroitin 500-400 mg tablet, Take 1 tablet by mouth 3 (three) times daily., Disp: , Rfl:     hydrocortisone-pramoxine (PROCTOFOAM-HS) rectal foam, Place 1 applicator rectally 2 (two) times daily., Disp: 1 applicator, Rfl: 0    metoprolol succinate (TOPROL-XL) 25 MG 24 hr tablet, TAKE ONE-HALF TABLET BY  MOUTH ONCE DAILY (Patient not taking: No sig reported), Disp: 45 tablet, Rfl: 3    midodrine (PROAMATINE) 2.5 MG Tab, Take 1  tab breakfast  And  supper, Disp: 60 tablet, Rfl: 0    multivitamin capsule, Take 1 capsule by mouth once daily., Disp: , Rfl:     nitroGLYCERIN (NITROSTAT) 0.4 MG SL tablet, Place 1 tablet (0.4 mg total) under the tongue every 5 (five) minutes as needed for Chest pain. (Patient not taking: Reported on 5/2/2022), Disp: 30 tablet, Rfl: 1    pantoprazole (PROTONIX) 40 MG tablet, Take 1 tablet (40 mg total) by mouth once daily., Disp: 90 tablet, Rfl: 3    temazepam (RESTORIL) 7.5 MG Cap, Take 7.5 mg by mouth nightly., Disp: , Rfl:     traZODone (DESYREL) 50 MG tablet, 1 tablet QHS prn insomnia (Patient not taking: Reported on  "6/28/2022), Disp: 90 tablet, Rfl: 1    Review of Systems   Constitutional: Negative for appetite change, chills, fatigue, fever and unexpected weight change.   HENT: Negative for congestion, ear pain and trouble swallowing.    Eyes: Negative for pain, discharge and visual disturbance.   Respiratory: Negative for apnea, cough, shortness of breath and wheezing.    Cardiovascular: Negative for chest pain and leg swelling.   Gastrointestinal: Negative for abdominal pain, blood in stool, constipation, diarrhea, nausea and vomiting.   Endocrine: Negative for cold intolerance, heat intolerance and polydipsia.   Genitourinary: Negative for dysuria, hematuria, testicular pain and urgency.   Musculoskeletal: Positive for arthralgias, back pain and gait problem. Negative for joint swelling and myalgias.   Neurological: Negative for dizziness, seizures and numbness.   Psychiatric/Behavioral: Positive for confusion. Negative for agitation, behavioral problems and hallucinations. The patient is not nervous/anxious.           Objective:      Vitals:    06/28/22 1502   BP: 98/60   Pulse: 72   Resp: 19   SpO2: 98%   Weight: 61.2 kg (135 lb)   Height: 5' 7" (1.702 m)     Physical Exam  Vitals and nursing note reviewed.   Constitutional:       Appearance: He is well-developed.      Comments: Frail elderly male   HENT:      Head: Normocephalic and atraumatic.      Right Ear: Tympanic membrane and external ear normal.      Left Ear: Tympanic membrane and external ear normal.      Ears:      Comments: Bilateral hearing aids     Nose: Nose normal.   Eyes:      Pupils: Pupils are equal, round, and reactive to light.   Neck:      Thyroid: No thyromegaly.      Vascular: No carotid bruit.   Cardiovascular:      Rate and Rhythm: Normal rate and regular rhythm.      Heart sounds: Normal heart sounds. No murmur heard.  Pulmonary:      Effort: Pulmonary effort is normal.      Breath sounds: Normal breath sounds. No wheezing or rales.   Abdominal: "      General: Bowel sounds are normal. There is no distension.      Palpations: Abdomen is soft.      Tenderness: There is no abdominal tenderness.   Musculoskeletal:         General: No tenderness or deformity. Normal range of motion.      Cervical back: Normal range of motion and neck supple.      Lumbar back: Normal. No spasms.      Comments: Bends 45 degrees at  waist shoulders and knees good range of motion but crepitant.  No pitting edema to lower extremities   Lymphadenopathy:      Cervical: No cervical adenopathy.   Skin:     General: Skin is warm and dry.      Findings: No rash.   Neurological:      Mental Status: He is alert and oriented to person, place, and time.      Cranial Nerves: No cranial nerve deficit.      Coordination: Coordination normal.      Gait: Gait abnormal.   Psychiatric:         Behavior: Behavior normal.         Thought Content: Thought content normal.         Judgment: Judgment normal.      Comments: Gets confused daily hard time processing information           Assessment:       1. Idiopathic hypotension    2. Gastro-esophageal reflux disease without esophagitis    3. Anxiety    4. Mixed hyperlipidemia    5. Mild cognitive impairment    6. DDD (degenerative disc disease), cervical    7. Essential hypertension    8. Orthostatic hypotension         Plan:       Idiopathic hypotension  -     Discontinue: midodrine (PROAMATINE) 2.5 MG Tab; Take 1 tablet (2.5 mg total) by mouth 3 (three) times daily.  Dispense: 90 tablet; Refill: 0  -     midodrine (PROAMATINE) 2.5 MG Tab; Take 1  tab breakfast  And  supper  Dispense: 60 tablet; Refill: 0  Trial of midodrine 2.5 mg bid  Gastro-esophageal reflux disease without esophagitis  Comments:  stable  Orders:  -     pantoprazole (PROTONIX) 40 MG tablet; Take 1 tablet (40 mg total) by mouth once daily.  Dispense: 90 tablet; Refill: 3    Anxiety  -     EScitalopram oxalate (LEXAPRO) 20 MG tablet; Take 1 tablet (20 mg total) by mouth once daily.   Dispense: 90 tablet; Refill: 3    Mixed hyperlipidemia  -     ezetimibe (ZETIA) 10 mg tablet; Take 1 tablet (10 mg total) by mouth once daily.  Dispense: 90 tablet; Refill: 3    Mild cognitive impairment    DDD (degenerative disc disease), cervical  Starting physical therapy for cervical and lumbar issues  Essential hypertension    Orthostatic hypotension      Follow up in about 3 months (around 9/28/2022), or hypotension.        7/2/2022 Jeyson Waldron

## 2022-07-02 NOTE — PROGRESS NOTES
MRI of the Lumbar spine was done. Patient came in with increasing low back pain. Patient stated that he was assisting his wife with ambulating and fell a pull in his back. Symptoms began to get worse a few days ago.

## 2022-07-03 NOTE — ED PROVIDER NOTES
Encounter Date: 7/2/2022       History     Chief Complaint   Patient presents with    Buttock (left) pain     Pt c/o left buttock pain and hemorrhoid complications.       Jarred Harrison is an 82 year old male with pmh AI, CAD, GERD, hyperlipidemia, HTN, sick sinus syndrome, LBBB presenting to the ED with c/o left buttock pain. He is currently under the care of Dr. Mccann for back pain and has been prescribed robaxin and tramadol. He has been taking 500 mg robaxin and 50 mg tramadol twice a day but states that over the past 2-3 days that his pain has increased and is not improved with the prescribed medication. He has had no bowel/bladder incontinence, saddle anesthesia, lower extremity weakness. He states he feels the pain mostly in the left buttock and that it radiates to the lower back, lower abdomen, and rectum and into the left leg.         Review of patient's allergies indicates:   Allergen Reactions    Hydrocodone      Past Medical History:   Diagnosis Date    AI (aortic insufficiency)     Coronary artery disease     GERD (gastroesophageal reflux disease)     Hyperlipidemia     Hypertension     LBBB (left bundle branch block)     SSS (sick sinus syndrome)      Past Surgical History:   Procedure Laterality Date    CARDIAC CATHETERIZATION      CATARACT EXTRACTION      CORONARY ANGIOPLASTY  08/29/82015    CORONARY STENT PLACEMENT  2015    Dr Parr    FOOT SURGERY Right     KNEE SURGERY Left     right elbow       No family history on file.  Social History     Tobacco Use    Smoking status: Never Smoker    Smokeless tobacco: Never Used   Substance Use Topics    Alcohol use: Not Currently     Review of Systems   Constitutional: Negative for fever.   HENT: Negative for sore throat.    Respiratory: Negative for shortness of breath.    Cardiovascular: Negative for chest pain.   Gastrointestinal: Negative for diarrhea, nausea and vomiting.   Genitourinary: Negative for dysuria.   Musculoskeletal:  Positive for back pain.   Skin: Negative for rash.   Neurological: Negative for weakness.   Hematological: Does not bruise/bleed easily.       Physical Exam     Initial Vitals   BP Pulse Resp Temp SpO2   07/02/22 1450 07/02/22 1448 07/02/22 1448 07/02/22 1448 07/02/22 1450   128/68 66 18 97.8 °F (36.6 °C) 97 %      MAP       --                Physical Exam    Nursing note and vitals reviewed.  Constitutional: He appears well-developed and well-nourished. He is not diaphoretic. No distress.   HENT:   Head: Normocephalic and atraumatic.   Mouth/Throat: Oropharynx is clear and moist.   Eyes: Conjunctivae are normal.   Neck: Neck supple.   Cardiovascular: Normal rate, regular rhythm, normal heart sounds and intact distal pulses. Exam reveals no gallop and no friction rub.    No murmur heard.  Pulses:       Dorsalis pedis pulses are 1+ on the right side and 1+ on the left side.   Pulmonary/Chest: Breath sounds normal. He has no wheezes. He has no rhonchi. He has no rales.   Abdominal: Abdomen is soft. He exhibits no distension. There is no abdominal tenderness.   Musculoskeletal:         General: Normal range of motion.      Cervical back: Neck supple.      Lumbar back: Bony tenderness present.        Back:       Comments: 5/5 strength in bilateral lower extremities  Ambulatory without assistance  No numbness to groin      Neurological: He is alert and oriented to person, place, and time.   Skin: No rash noted. No erythema.         ED Course   Procedures  Labs Reviewed - No data to display       Imaging Results          MRI Lumbar Spine Without Contrast (Final result)  Result time 07/02/22 16:03:27    Final result by Marcel Champion MD (07/02/22 16:03:27)                 Narrative:    Reason: Lumbar radiculopathy, symptoms persist with conservative treatment    TECHNIQUE: Lumbar spine MRI without IV contrast    COMPARISON: Lumbar spine radiographs 6/13/2022, CT 12/26/2015    FINDINGS:  At T12-L1, annular bulge without  narrowing.    At L1-L2, 2 mm retrolisthesis of L1 on L2 combine with circumferential disc bulge to result in minor central canal and minor bilateral inferior neural foramen narrowing.    At L2-L3, circumferential disc bulge has prominent left extraforaminal protrusion which compresses the left extraforaminal L2 spinal nerve. Mild central canal narrowing and minor bilateral neural foramen narrowing occur.    At L3-L4, circumferential disc bulge, mild bilateral facet joint osteoarthrosis, and ligamentum flavum buckling results in moderate central canal narrowing. Mild to moderate bilateral neural foramen narrowing is present. Extraforaminal components of the disc bulge bilaterally contact bilateral extraforaminal L3 spinal nerves.    L4-L5, circumferential disc bulge and mild bilateral facet joint osteoarthrosis and ligamentum flavum buckling results in severe central canal narrowing. Right midline annular fissure is present. Mild to moderate bilateral neural foramen narrowing occurs. Right extraforaminal protrusive component contacts right extraforaminal L4 spinal nerve.    At L5-S1, left midline and subarticular disc extrusion reaches pedicular level of L5 and compresses left S1 nerve roots in left lateral aspect of the thecal sac. This is superimposed on circumferential disc bulge results results in mild central canal and moderate right lateral recess narrowing. Mild bilateral facet joint osteoarthrosis contributes to moderate left and mild right neural foramen narrowing.    Lumbosacral alignment is normal. Vertebral bodies maintain normal bone marrow signal. Conus terminates normally at T11-T12 disc level. Paraspinal soft tissues are unremarkable aside from right hepatic lobe cyst which remains unchanged since 12/26/2015. Coronal T1 sequence shows partially exophytic left renal hypointensity, likely representing cyst, although this has enlarged since 12/26/2015.    IMPRESSION:    1. Left midline and subarticular  L5-S1 disc extrusion compressing left S1 nerve roots. Correlation for left S1 radiculopathy is requested.  2. Left extraforaminal L2-L3 disc protrusion compressing left extraforaminal L2 spinal nerve. Correlation for left L2 radiculopathy is requested.  3. Moderate L3-L4 and severe L4-L5 central canal narrowing.  4. Extraforaminal components of L3-L4 disc bulge contacting bilateral extraforaminal L3 spinal nerves.  5. Right foraminal protrusive component of L4-L5 disc bulge contacting right extraforaminal L4 spinal nerve.  6. Apparent enlargement of hypointensity of left kidney, suboptimally evaluated on this exam, most likely representing a cyst. Correlation with nonemergent bilateral renal ultrasound is recommended to confirm this represents a simple cyst.    Electronically signed by:  Marcel Champion MD  7/2/2022 4:03 PM CDT Workstation: [id number]                               Medications   methylPREDNISolone acetate injection 40 mg (40 mg Intramuscular Given 7/2/22 1657)   methocarbamoL tablet 1,000 mg (1,000 mg Oral Given 7/2/22 1710)   traMADoL tablet 50 mg (50 mg Oral Given 7/2/22 1710)           APC / Resident Notes:   The patient has had increasing pain over the past 2 days so MRI was performed in the ED to r/o emergent cause of worsening pain without specific injury/trauma. He is found to have central canal narrowing and nerve root compression. He has no signs of cauda equina and is ambulatory without assistance. He has not been taking his medications as prescribed so I adjusted these medications slightly and instructed him to follow up with Dr. Mccann next week. He will have family staying with him and I discussed with his grandson the importance of having someone to monitor him while these medications are adjusted to note any signs of dizziness/unsteadiness/drowsiness that would necessitate a decrease or change in the medication. Will have the patient take 1000 mg robaxin BID and increased tramadol to  three times daily since he has been taking it only twice daily. IM depomedrol also given while in the ED. Strict ED return precautions discussed and the patient and grandson verbalized understanding. Based on my clinical evaluation, I do not appreciate any immediate, emergent, or life threatening condition or etiology that warrants additional workup today and feel that the patient can be discharged with close follow up care.                    Clinical Impression:   Final diagnoses:  [M54.42, G89.29] Chronic left-sided low back pain with left-sided sciatica (Primary)  [M54.16] Lumbar radiculopathy          ED Disposition Condition    Discharge Stable        ED Prescriptions     Medication Sig Dispense Start Date End Date Auth. Provider    traMADoL (ULTRAM) 50 mg tablet Take 1 tablet (50 mg total) by mouth every 8 (eight) hours as needed for Pain. 15 tablet 7/2/2022  Brandee Larios NP        Follow-up Information     Follow up With Specialties Details Why Contact Info Additional Information    [facility] - Emergency Dept Emergency Medicine  As needed, If symptoms worsen [address] [id number] [phone] 1st floor    Jeyson Waldron MD Family Medicine, Home Health Services, Hospice Services   [address]  [address]  [facility] [id number] [phone]       Jeyson Mccann MD Physical Medicine and Rehabilitation   [address] [place] [address] [address], [address]  [facility] [address]  [phone]              Brandee Larios NP  07/03/22 0057

## 2022-07-05 ENCOUNTER — PATIENT MESSAGE (OUTPATIENT)
Dept: SPINE | Facility: CLINIC | Age: 82
End: 2022-07-05
Payer: MEDICARE

## 2022-07-05 ENCOUNTER — PATIENT MESSAGE (OUTPATIENT)
Dept: FAMILY MEDICINE | Facility: CLINIC | Age: 82
End: 2022-07-05

## 2022-07-07 ENCOUNTER — OFFICE VISIT (OUTPATIENT)
Dept: SPINE | Facility: CLINIC | Age: 82
End: 2022-07-07
Payer: MEDICARE

## 2022-07-07 ENCOUNTER — TELEPHONE (OUTPATIENT)
Dept: PAIN MEDICINE | Facility: CLINIC | Age: 82
End: 2022-07-07
Payer: MEDICARE

## 2022-07-07 VITALS — WEIGHT: 135 LBS | RESPIRATION RATE: 18 BRPM | HEIGHT: 67 IN | BODY MASS INDEX: 21.19 KG/M2

## 2022-07-07 DIAGNOSIS — N28.1 RENAL CYST: ICD-10-CM

## 2022-07-07 DIAGNOSIS — M51.36 DDD (DEGENERATIVE DISC DISEASE), LUMBAR: Primary | ICD-10-CM

## 2022-07-07 DIAGNOSIS — M54.42 ACUTE LEFT-SIDED LOW BACK PAIN WITH LEFT-SIDED SCIATICA: Primary | ICD-10-CM

## 2022-07-07 PROCEDURE — 99213 OFFICE O/P EST LOW 20 MIN: CPT | Mod: S$GLB,,, | Performed by: PHYSICAL MEDICINE & REHABILITATION

## 2022-07-07 PROCEDURE — 1159F MED LIST DOCD IN RCRD: CPT | Mod: CPTII,S$GLB,, | Performed by: PHYSICAL MEDICINE & REHABILITATION

## 2022-07-07 PROCEDURE — 1125F PR PAIN SEVERITY QUANTIFIED, PAIN PRESENT: ICD-10-PCS | Mod: CPTII,S$GLB,, | Performed by: PHYSICAL MEDICINE & REHABILITATION

## 2022-07-07 PROCEDURE — 3288F FALL RISK ASSESSMENT DOCD: CPT | Mod: CPTII,S$GLB,, | Performed by: PHYSICAL MEDICINE & REHABILITATION

## 2022-07-07 PROCEDURE — 1101F PR PT FALLS ASSESS DOC 0-1 FALLS W/OUT INJ PAST YR: ICD-10-PCS | Mod: CPTII,S$GLB,, | Performed by: PHYSICAL MEDICINE & REHABILITATION

## 2022-07-07 PROCEDURE — 1101F PT FALLS ASSESS-DOCD LE1/YR: CPT | Mod: CPTII,S$GLB,, | Performed by: PHYSICAL MEDICINE & REHABILITATION

## 2022-07-07 PROCEDURE — 1125F AMNT PAIN NOTED PAIN PRSNT: CPT | Mod: CPTII,S$GLB,, | Performed by: PHYSICAL MEDICINE & REHABILITATION

## 2022-07-07 PROCEDURE — 1159F PR MEDICATION LIST DOCUMENTED IN MEDICAL RECORD: ICD-10-PCS | Mod: CPTII,S$GLB,, | Performed by: PHYSICAL MEDICINE & REHABILITATION

## 2022-07-07 PROCEDURE — 3288F PR FALLS RISK ASSESSMENT DOCUMENTED: ICD-10-PCS | Mod: CPTII,S$GLB,, | Performed by: PHYSICAL MEDICINE & REHABILITATION

## 2022-07-07 PROCEDURE — 1160F PR REVIEW ALL MEDS BY PRESCRIBER/CLIN PHARMACIST DOCUMENTED: ICD-10-PCS | Mod: CPTII,S$GLB,, | Performed by: PHYSICAL MEDICINE & REHABILITATION

## 2022-07-07 PROCEDURE — 1160F RVW MEDS BY RX/DR IN RCRD: CPT | Mod: CPTII,S$GLB,, | Performed by: PHYSICAL MEDICINE & REHABILITATION

## 2022-07-07 PROCEDURE — 99213 PR OFFICE/OUTPT VISIT, EST, LEVL III, 20-29 MIN: ICD-10-PCS | Mod: S$GLB,,, | Performed by: PHYSICAL MEDICINE & REHABILITATION

## 2022-07-07 NOTE — TELEPHONE ENCOUNTER
----- Message from Jeyson Mccann MD sent at 7/7/2022  1:57 PM CDT -----   please schedule for interlaminar injection L5-S1

## 2022-07-07 NOTE — H&P (VIEW-ONLY)
SUBJECTIVE:    Patient ID: Jarred Harrison is a 82 y.o. male.    Chief Complaint: Follow-up (MRI f/u)    He is here to review his lumbar MRI done on 07/02/2022 to evaluate his complaint of left-sided low back pain radiating into the left calf    The MRI is summarized below:    FINDINGS:  At T12-L1, annular bulge without narrowing.     At L1-L2, 2 mm retrolisthesis of L1 on L2 combine with circumferential disc bulge to result in minor central canal and minor bilateral inferior neural foramen narrowing.     At L2-L3, circumferential disc bulge has prominent left extraforaminal protrusion which compresses the left extraforaminal L2 spinal nerve. Mild central canal narrowing and minor bilateral neural foramen narrowing occur.     At L3-L4, circumferential disc bulge, mild bilateral facet joint osteoarthrosis, and ligamentum flavum buckling results in moderate central canal narrowing. Mild to moderate bilateral neural foramen narrowing is present. Extraforaminal components of the disc bulge bilaterally contact bilateral extraforaminal L3 spinal nerves.     L4-L5, circumferential disc bulge and mild bilateral facet joint osteoarthrosis and ligamentum flavum buckling results in severe central canal narrowing. Right midline annular fissure is present. Mild to moderate bilateral neural foramen narrowing occurs. Right extraforaminal protrusive component contacts right extraforaminal L4 spinal nerve.     At L5-S1, left midline and subarticular disc extrusion reaches pedicular level of L5 and compresses left S1 nerve roots in left lateral aspect of the thecal sac. This is superimposed on circumferential disc bulge results results in mild central canal and moderate right lateral recess narrowing. Mild bilateral facet joint osteoarthrosis contributes to moderate left and mild right neural foramen narrowing.     Lumbosacral alignment is normal. Vertebral bodies maintain normal bone marrow signal. Conus terminates normally at  T11-T12 disc level. Paraspinal soft tissues are unremarkable aside from right hepatic lobe cyst which remains unchanged since 12/26/2015. Coronal T1 sequence shows partially exophytic left renal hypointensity, likely representing cyst, although this has enlarged since 12/26/2015.     IMPRESSION:     1. Left midline and subarticular L5-S1 disc extrusion compressing left S1 nerve roots. Correlation for left S1 radiculopathy is requested.  2. Left extraforaminal L2-L3 disc protrusion compressing left extraforaminal L2 spinal nerve. Correlation for left L2 radiculopathy is requested.  3. Moderate L3-L4 and severe L4-L5 central canal narrowing.  4. Extraforaminal components of L3-L4 disc bulge contacting bilateral extraforaminal L3 spinal nerves.  5. Right foraminal protrusive component of L4-L5 disc bulge contacting right extraforaminal L4 spinal nerve.  6. Apparent enlargement of hypointensity of left kidney, suboptimally evaluated on this exam, most likely representing a cyst. Correlation with nonemergent bilateral renal ultrasound is recommended to confirm this represents a simple cyst.    Clinically he continues to struggle with pain.  Left-sided low back pain at the lumbosacral junction radiating down the posterior portion of the left leg into the calf.  Pain level is 8/10.  Tramadol and Robaxin are not really covering his pain        Past Medical History:   Diagnosis Date    AI (aortic insufficiency)     Coronary artery disease     GERD (gastroesophageal reflux disease)     Hyperlipidemia     Hypertension     LBBB (left bundle branch block)     SSS (sick sinus syndrome)      Social History     Socioeconomic History    Marital status:    Tobacco Use    Smoking status: Never Smoker    Smokeless tobacco: Never Used   Substance and Sexual Activity    Alcohol use: Not Currently     Past Surgical History:   Procedure Laterality Date    CARDIAC CATHETERIZATION      CATARACT EXTRACTION      CORONARY  "ANGIOPLASTY  08/29/37364    CORONARY STENT PLACEMENT  2015    Dr Parr    FOOT SURGERY Right     KNEE SURGERY Left     right elbow       History reviewed. No pertinent family history.  Vitals:    07/07/22 1352   Resp: 18   Weight: 61.2 kg (135 lb)   Height: 5' 7" (1.702 m)       Review of Systems   Constitutional: Negative for chills, diaphoresis, fatigue, fever and unexpected weight change.   HENT: Negative for trouble swallowing.    Eyes: Negative for visual disturbance.   Respiratory: Negative for shortness of breath.    Cardiovascular: Negative for chest pain.   Gastrointestinal: Negative for abdominal pain, constipation, diarrhea, nausea and vomiting.   Genitourinary: Negative for difficulty urinating.   Musculoskeletal: Negative for arthralgias, back pain, gait problem, joint swelling, myalgias, neck pain and neck stiffness.   Neurological: Negative for dizziness, speech difficulty, weakness, light-headedness, numbness and headaches.          Objective:      Physical Exam  Neurological:      Mental Status: He is alert and oriented to person, place, and time.             Assessment:       1. Acute left-sided low back pain with left-sided sciatica    2. Renal cyst           Plan:     he has symptoms of left S1 radiculitis from herniated disc at L5-S1.  I am recommending interlaminar injections at L5-S1 with consideration of transforaminal injections on left at L5-S1 and S1.  He has gabapentin available to him but his family members have been hesitant for him to use it.  They can consider that at their discretion.  Going to get a renal ultrasound to take a better look at the incidentally noted, suspected, left renal cyst.  Follow-up with me after the procedure      Acute left-sided low back pain with left-sided sciatica    Renal cyst  -     US Retroperitoneal Complete (Kidney and; Future; Expected date: 07/07/2022          "

## 2022-07-07 NOTE — PROGRESS NOTES
SUBJECTIVE:    Patient ID: Jarred Harrison is a 82 y.o. male.    Chief Complaint: Follow-up (MRI f/u)    He is here to review his lumbar MRI done on 07/02/2022 to evaluate his complaint of left-sided low back pain radiating into the left calf    The MRI is summarized below:    FINDINGS:  At T12-L1, annular bulge without narrowing.     At L1-L2, 2 mm retrolisthesis of L1 on L2 combine with circumferential disc bulge to result in minor central canal and minor bilateral inferior neural foramen narrowing.     At L2-L3, circumferential disc bulge has prominent left extraforaminal protrusion which compresses the left extraforaminal L2 spinal nerve. Mild central canal narrowing and minor bilateral neural foramen narrowing occur.     At L3-L4, circumferential disc bulge, mild bilateral facet joint osteoarthrosis, and ligamentum flavum buckling results in moderate central canal narrowing. Mild to moderate bilateral neural foramen narrowing is present. Extraforaminal components of the disc bulge bilaterally contact bilateral extraforaminal L3 spinal nerves.     L4-L5, circumferential disc bulge and mild bilateral facet joint osteoarthrosis and ligamentum flavum buckling results in severe central canal narrowing. Right midline annular fissure is present. Mild to moderate bilateral neural foramen narrowing occurs. Right extraforaminal protrusive component contacts right extraforaminal L4 spinal nerve.     At L5-S1, left midline and subarticular disc extrusion reaches pedicular level of L5 and compresses left S1 nerve roots in left lateral aspect of the thecal sac. This is superimposed on circumferential disc bulge results results in mild central canal and moderate right lateral recess narrowing. Mild bilateral facet joint osteoarthrosis contributes to moderate left and mild right neural foramen narrowing.     Lumbosacral alignment is normal. Vertebral bodies maintain normal bone marrow signal. Conus terminates normally at  T11-T12 disc level. Paraspinal soft tissues are unremarkable aside from right hepatic lobe cyst which remains unchanged since 12/26/2015. Coronal T1 sequence shows partially exophytic left renal hypointensity, likely representing cyst, although this has enlarged since 12/26/2015.     IMPRESSION:     1. Left midline and subarticular L5-S1 disc extrusion compressing left S1 nerve roots. Correlation for left S1 radiculopathy is requested.  2. Left extraforaminal L2-L3 disc protrusion compressing left extraforaminal L2 spinal nerve. Correlation for left L2 radiculopathy is requested.  3. Moderate L3-L4 and severe L4-L5 central canal narrowing.  4. Extraforaminal components of L3-L4 disc bulge contacting bilateral extraforaminal L3 spinal nerves.  5. Right foraminal protrusive component of L4-L5 disc bulge contacting right extraforaminal L4 spinal nerve.  6. Apparent enlargement of hypointensity of left kidney, suboptimally evaluated on this exam, most likely representing a cyst. Correlation with nonemergent bilateral renal ultrasound is recommended to confirm this represents a simple cyst.    Clinically he continues to struggle with pain.  Left-sided low back pain at the lumbosacral junction radiating down the posterior portion of the left leg into the calf.  Pain level is 8/10.  Tramadol and Robaxin are not really covering his pain        Past Medical History:   Diagnosis Date    AI (aortic insufficiency)     Coronary artery disease     GERD (gastroesophageal reflux disease)     Hyperlipidemia     Hypertension     LBBB (left bundle branch block)     SSS (sick sinus syndrome)      Social History     Socioeconomic History    Marital status:    Tobacco Use    Smoking status: Never Smoker    Smokeless tobacco: Never Used   Substance and Sexual Activity    Alcohol use: Not Currently     Past Surgical History:   Procedure Laterality Date    CARDIAC CATHETERIZATION      CATARACT EXTRACTION      CORONARY  "ANGIOPLASTY  08/29/31741    CORONARY STENT PLACEMENT  2015    Dr Parr    FOOT SURGERY Right     KNEE SURGERY Left     right elbow       History reviewed. No pertinent family history.  Vitals:    07/07/22 1352   Resp: 18   Weight: 61.2 kg (135 lb)   Height: 5' 7" (1.702 m)       Review of Systems   Constitutional: Negative for chills, diaphoresis, fatigue, fever and unexpected weight change.   HENT: Negative for trouble swallowing.    Eyes: Negative for visual disturbance.   Respiratory: Negative for shortness of breath.    Cardiovascular: Negative for chest pain.   Gastrointestinal: Negative for abdominal pain, constipation, diarrhea, nausea and vomiting.   Genitourinary: Negative for difficulty urinating.   Musculoskeletal: Negative for arthralgias, back pain, gait problem, joint swelling, myalgias, neck pain and neck stiffness.   Neurological: Negative for dizziness, speech difficulty, weakness, light-headedness, numbness and headaches.          Objective:      Physical Exam  Neurological:      Mental Status: He is alert and oriented to person, place, and time.             Assessment:       1. Acute left-sided low back pain with left-sided sciatica    2. Renal cyst           Plan:     he has symptoms of left S1 radiculitis from herniated disc at L5-S1.  I am recommending interlaminar injections at L5-S1 with consideration of transforaminal injections on left at L5-S1 and S1.  He has gabapentin available to him but his family members have been hesitant for him to use it.  They can consider that at their discretion.  Going to get a renal ultrasound to take a better look at the incidentally noted, suspected, left renal cyst.  Follow-up with me after the procedure      Acute left-sided low back pain with left-sided sciatica    Renal cyst  -     US Retroperitoneal Complete (Kidney and; Future; Expected date: 07/07/2022          "

## 2022-07-11 ENCOUNTER — PATIENT MESSAGE (OUTPATIENT)
Dept: SPINE | Facility: CLINIC | Age: 82
End: 2022-07-11
Payer: MEDICARE

## 2022-07-11 NOTE — TELEPHONE ENCOUNTER
The epidural injection is optional.  He can try physical therapy instead at his discretion.  The procedures are done with direct visualization using a specialized x-ray machine.  Injections do not provide instant relief.  It can take up to 2 weeks for them to take effect.  Many of the patient's who receive epidural injections are elderly.  Procedure is very well tolerated.  Patient can return to usual activities the day after the procedure

## 2022-07-12 ENCOUNTER — PATIENT MESSAGE (OUTPATIENT)
Dept: SPINE | Facility: CLINIC | Age: 82
End: 2022-07-12
Payer: MEDICARE

## 2022-07-12 RX ORDER — METHOCARBAMOL 500 MG/1
500 TABLET, FILM COATED ORAL 2 TIMES DAILY PRN
Qty: 60 TABLET | Refills: 1 | Status: SHIPPED | OUTPATIENT
Start: 2022-07-12 | End: 2022-07-22

## 2022-07-15 ENCOUNTER — HOSPITAL ENCOUNTER (OUTPATIENT)
Dept: RADIOLOGY | Facility: HOSPITAL | Age: 82
Discharge: HOME | End: 2022-07-15
Attending: PHYSICAL MEDICINE & REHABILITATION
Payer: MEDICARE

## 2022-07-15 ENCOUNTER — PATIENT MESSAGE (OUTPATIENT)
Dept: SPINE | Facility: CLINIC | Age: 82
End: 2022-07-15
Payer: MEDICARE

## 2022-07-15 DIAGNOSIS — N28.1 RENAL CYST: ICD-10-CM

## 2022-07-15 PROCEDURE — 76770 US EXAM ABDO BACK WALL COMP: CPT | Mod: TC

## 2022-07-15 PROCEDURE — 76770 US RETROPERITONEAL COMPLETE: ICD-10-PCS | Mod: 26,,, | Performed by: RADIOLOGY

## 2022-07-15 PROCEDURE — 76770 US EXAM ABDO BACK WALL COMP: CPT | Mod: 26,,, | Performed by: RADIOLOGY

## 2022-07-15 NOTE — TELEPHONE ENCOUNTER
Please let him know his ultrasound shows  simple kidney cysts.  These are harmless and need no further workup.  I do not have an opinion about cushions.  He can try over-the-counter cushions at his discretion and choose whichever 1 makes him most comfortable

## 2022-07-18 ENCOUNTER — PATIENT MESSAGE (OUTPATIENT)
Dept: CARDIOLOGY | Facility: CLINIC | Age: 82
End: 2022-07-18

## 2022-07-18 ENCOUNTER — OFFICE VISIT (OUTPATIENT)
Dept: CARDIOLOGY | Facility: CLINIC | Age: 82
End: 2022-07-18
Payer: MEDICARE

## 2022-07-18 VITALS
HEART RATE: 71 BPM | OXYGEN SATURATION: 98 % | SYSTOLIC BLOOD PRESSURE: 140 MMHG | RESPIRATION RATE: 16 BRPM | HEIGHT: 67 IN | WEIGHT: 132.69 LBS | DIASTOLIC BLOOD PRESSURE: 80 MMHG | BODY MASS INDEX: 20.83 KG/M2

## 2022-07-18 DIAGNOSIS — I44.7 LBBB (LEFT BUNDLE BRANCH BLOCK): ICD-10-CM

## 2022-07-18 DIAGNOSIS — G45.0 VERTEBRAL ARTERY INSUFFICIENCY: ICD-10-CM

## 2022-07-18 DIAGNOSIS — E78.2 MIXED HYPERLIPIDEMIA: ICD-10-CM

## 2022-07-18 DIAGNOSIS — I25.10 CORONARY ARTERY DISEASE INVOLVING NATIVE CORONARY ARTERY OF NATIVE HEART WITHOUT ANGINA PECTORIS: Primary | ICD-10-CM

## 2022-07-18 DIAGNOSIS — R42 VERTIGO: ICD-10-CM

## 2022-07-18 DIAGNOSIS — T88.7XXA DRUG SIDE EFFECTS: ICD-10-CM

## 2022-07-18 DIAGNOSIS — I10 ESSENTIAL HYPERTENSION: ICD-10-CM

## 2022-07-18 DIAGNOSIS — I25.10 ATHEROSCLEROSIS OF NATIVE CORONARY ARTERY OF NATIVE HEART WITHOUT ANGINA PECTORIS: ICD-10-CM

## 2022-07-18 PROCEDURE — 3077F PR MOST RECENT SYSTOLIC BLOOD PRESSURE >= 140 MM HG: ICD-10-PCS | Mod: CPTII,S$GLB,, | Performed by: SPECIALIST

## 2022-07-18 PROCEDURE — 93000 EKG 12-LEAD: ICD-10-PCS | Mod: S$GLB,,, | Performed by: INTERNAL MEDICINE

## 2022-07-18 PROCEDURE — 1126F AMNT PAIN NOTED NONE PRSNT: CPT | Mod: CPTII,S$GLB,, | Performed by: SPECIALIST

## 2022-07-18 PROCEDURE — 1160F PR REVIEW ALL MEDS BY PRESCRIBER/CLIN PHARMACIST DOCUMENTED: ICD-10-PCS | Mod: CPTII,S$GLB,, | Performed by: SPECIALIST

## 2022-07-18 PROCEDURE — 1159F PR MEDICATION LIST DOCUMENTED IN MEDICAL RECORD: ICD-10-PCS | Mod: CPTII,S$GLB,, | Performed by: SPECIALIST

## 2022-07-18 PROCEDURE — 1126F PR PAIN SEVERITY QUANTIFIED, NO PAIN PRESENT: ICD-10-PCS | Mod: CPTII,S$GLB,, | Performed by: SPECIALIST

## 2022-07-18 PROCEDURE — 3079F PR MOST RECENT DIASTOLIC BLOOD PRESSURE 80-89 MM HG: ICD-10-PCS | Mod: CPTII,S$GLB,, | Performed by: SPECIALIST

## 2022-07-18 PROCEDURE — 3077F SYST BP >= 140 MM HG: CPT | Mod: CPTII,S$GLB,, | Performed by: SPECIALIST

## 2022-07-18 PROCEDURE — 99215 PR OFFICE/OUTPT VISIT, EST, LEVL V, 40-54 MIN: ICD-10-PCS | Mod: S$GLB,,, | Performed by: SPECIALIST

## 2022-07-18 PROCEDURE — 1160F RVW MEDS BY RX/DR IN RCRD: CPT | Mod: CPTII,S$GLB,, | Performed by: SPECIALIST

## 2022-07-18 PROCEDURE — 1159F MED LIST DOCD IN RCRD: CPT | Mod: CPTII,S$GLB,, | Performed by: SPECIALIST

## 2022-07-18 PROCEDURE — 3079F DIAST BP 80-89 MM HG: CPT | Mod: CPTII,S$GLB,, | Performed by: SPECIALIST

## 2022-07-18 PROCEDURE — 93000 ELECTROCARDIOGRAM COMPLETE: CPT | Mod: S$GLB,,, | Performed by: INTERNAL MEDICINE

## 2022-07-18 PROCEDURE — 99215 OFFICE O/P EST HI 40 MIN: CPT | Mod: S$GLB,,, | Performed by: SPECIALIST

## 2022-07-18 NOTE — PROGRESS NOTES
Subjective:    Patient ID:  Jarred Harrison is a 82 y.o. male who presents for   Dizziness and Blood Pressure Check    HPI1)  Dizzy x mos    Started before methacarbanol + tramadol   '  Dizzy standing -  slo spin  Sits for 30 sec and goes   Hr ok but bp  littlie  Lo     no syncope    no associated  n or vomit or pain     No nocturnal dizziness  Midodrine  ? Help    2)   Neuro  Did  Mri  And ok   3. Patient suffers from severe sciatica of the left leg and when he sits up or tries to stand up he gets pain and then he becomes dizzy       ++++++++++++++++++++++++++++++++++++++++++++++++++++++++++++++++++++++++++++++++++++++++++++++++++++++++++++--------------------------------------------------------------------------------------------------------------------------------------------------------------------------------------------------------------------------    Review of patient's allergies indicates:   Allergen Reactions    Hydrocodone        Past Medical History:   Diagnosis Date    AI (aortic insufficiency)     Coronary artery disease     GERD (gastroesophageal reflux disease)     Hyperlipidemia     Hypertension     LBBB (left bundle branch block)     SSS (sick sinus syndrome)      Past Surgical History:   Procedure Laterality Date    CARDIAC CATHETERIZATION      CATARACT EXTRACTION      CORONARY ANGIOPLASTY  08/29/89403    CORONARY STENT PLACEMENT  2015    Dr Parr    FOOT SURGERY Right     KNEE SURGERY Left     right elbow       Social History     Tobacco Use    Smoking status: Never Smoker    Smokeless tobacco: Never Used   Substance Use Topics    Alcohol use: Not Currently        Review of Systems     Review of Systems   Constitutional: Positive for weight loss. Negative for decreased appetite, malaise/fatigue and weight gain.        + vaccine      HENT: Positive for hearing loss. Negative for congestion and tinnitus.    Eyes: Negative for blurred vision, vision loss in left eye, vision  loss in right eye, visual disturbance and visual halos.   Cardiovascular: Negative for chest pain, claudication, dyspnea on exertion, irregular heartbeat, leg swelling, near-syncope, orthopnea, palpitations, paroxysmal nocturnal dyspnea and syncope.        I stent     Respiratory: Negative for cough, hemoptysis, shortness of breath, sleep disturbances due to breathing, snoring, sputum production and wheezing.    Endocrine: Negative for polydipsia, polyphagia and polyuria.   Hematologic/Lymphatic: Negative for adenopathy. Does not bruise/bleed easily.   Skin: Negative.  Negative for dry skin, itching, poor wound healing, rash, skin cancer and suspicious lesions.        + skin  Ca  Melanoma x 20  Years ago      Musculoskeletal: Negative for arthritis, back pain, falls, gout, joint pain, joint swelling, muscle cramps, muscle weakness, neck pain and stiffness.        Sciatica  Left leg    Gastrointestinal: Negative.  Negative for abdominal pain, change in bowel habit, constipation, diarrhea, heartburn, hematemesis, hemorrhoids, melena, nausea and vomiting.   Genitourinary: Negative.  Negative for bladder incontinence, dysuria, flank pain, frequency, hematuria, nocturia and non-menstrual bleeding.   Neurological: Negative for brief paralysis, difficulty with concentration, disturbances in coordination, dizziness, focal weakness, headaches, loss of balance, numbness, paresthesias and tremors.   Psychiatric/Behavioral: Negative for altered mental status, depression, hallucinations, memory loss, substance abuse, suicidal ideas and thoughts of violence. The patient does not have insomnia and is not nervous/anxious.    Allergic/Immunologic: Negative for environmental allergies and hives.           Objective:        Vitals:    07/18/22 1511   BP: (!) 140/80   Pulse: 71   Resp: 16       Lab Results   Component Value Date    WBC 6.1 05/03/2022    HGB 12.3 (L) 05/03/2022    HCT 37.3 (L) 05/03/2022     05/03/2022    CHOL  118 05/03/2022    TRIG 56 05/03/2022    HDL 44 05/03/2022    ALT 16 05/03/2022    AST 26 05/03/2022     05/03/2022    K 4.7 05/03/2022     05/03/2022    CREATININE 1.11 05/03/2022    BUN 20 05/03/2022    CO2 29 05/03/2022    TSH 3.25 12/20/2021        ECHOCARDIOGRAM RESULTS  Results for orders placed during the hospital encounter of 07/22/21    Echo    Interpretation Summary  · The left ventricle is normal in size with normal systolic function.  · The estimated ejection fraction is 55%.  · Normal left ventricular diastolic function.  · Normal right ventricular size with normal right ventricular systolic function.  · Mild mitral regurgitation.  · Mild tricuspid regurgitation.  · Normal central venous pressure (3 mmHg).  · The estimated PA systolic pressure is 19 mmHg.      CURRENT/PREVIOUS VISIT EKG  Results for orders placed or performed in visit on 07/14/21   IN OFFICE EKG 12-LEAD (to [facility])    Collection Time: 07/14/21  4:07 PM    Narrative    Test Reason : R07.9,    Vent. Rate : 066 BPM     Atrial Rate : 066 BPM     P-R Int : 178 ms          QRS Dur : 152 ms      QT Int : 454 ms       P-R-T Axes : 057 032 100 degrees     QTc Int : 475 ms    Sinus rhythm with Premature ventricular complexes or Fusion complexes with  junctional escape complexes  Left bundle branch block  Abnormal ECG  No previous ECGs available  Confirmed by Quentin Mcdonald MD ([id number]) on 7/19/2021 8:40:24 AM    Referred By: JESIKA DIAZ           Confirmed By:Quentin Mcdonald MD     Results for orders placed during the hospital encounter of 07/22/21    Echo    Interpretation Summary  · The left ventricle is normal in size with normal systolic function.  · The estimated ejection fraction is 55%.  · Normal left ventricular diastolic function.  · Normal right ventricular size with normal right ventricular systolic function.  · Mild mitral regurgitation.  · Mild tricuspid regurgitation.  · Normal central venous pressure (3 mmHg).  · The  estimated PA systolic pressure is 19 mmHg.    Results for orders placed during the hospital encounter of 07/22/21    Nuclear Stress - Cardiology Interpreted    Interpretation Summary    There is a mild to moderate intensity, small to moderate sized, equivocal defect that is fixed in the inferoseptal wall(s). This finding is equivocal due to diaphragm shadow.    The gated perfusion images showed an ejection fraction of 64% post stress. Normal ejection fraction is greater than 53%.    The EKG portion of this study is uninterpretable.    The patient reported chest pain during the stress test.    There were no arrhythmias during stress.    There is no evidence of ischemia      PHYSICAL EXAM    Physical Exam blood pressure is 120/80 sitting and pulse rate 60 and when he stands blood pressure is 110 pulse rate of 70 in both arms  No carotid bruits  Lungs are clear  Cardiac regular  Abdomen no masses  Extremities no flatus edema good pulses  Neurologic decreased left patellar reflex  No cerebellar signs no nystagmus    Medication List with Changes/Refills   Current Medications    ASCORBIC ACID, VITAMIN C, (VITAMIN C) 500 MG TABLET    Take 500 mg by mouth once daily.    ASPIRIN (ECOTRIN) 81 MG EC TABLET    Take 81 mg by mouth once daily.    ATORVASTATIN (LIPITOR) 40 MG TABLET    TAKE 1 TABLET BY MOUTH AT  BEDTIME    CETIRIZINE (ZYRTEC) 10 MG TABLET    TK 1 T PO D PRF ALLERGIES/SINUS COG    COENZYME Q10 100 MG CAPSULE    Take 100 mg by mouth once daily.    ESCITALOPRAM OXALATE (LEXAPRO) 20 MG TABLET    Take 1 tablet (20 mg total) by mouth once daily.    EZETIMIBE (ZETIA) 10 MG TABLET    Take 1 tablet (10 mg total) by mouth once daily.    GABAPENTIN (NEURONTIN) 100 MG CAPSULE    Take 1 capsule (100 mg total) by mouth every evening.    GLUC MUÑOZ/CHONDRO MUÑOZ A/VIT C/MN (GLUCOSAMINE 1500 COMPLEX ORAL)    Glucosamine    AND CHONDROITIN 1500, 1200 MG MULTIVITAMIN    GLUCOSAMINE-CHONDROITIN 500-400 MG TABLET    Take 1 tablet by  mouth 3 (three) times daily.    HYDROCORTISONE-PRAMOXINE (PROCTOFOAM-HS) RECTAL FOAM    Place 1 applicator rectally 2 (two) times daily.    HYOSCYAMINE (ANASPAZ,LEVSIN) 0.125 MG TAB    Take 125 mcg by mouth 2 (two) times a day.    LATANOPROST 0.005 % OPHTHALMIC SOLUTION        METHOCARBAMOL (ROBAXIN) 500 MG TAB    Take 1 tablet (500 mg total) by mouth 2 (two) times daily as needed (Pain).    METOPROLOL SUCCINATE (TOPROL-XL) 25 MG 24 HR TABLET    TAKE ONE-HALF TABLET BY  MOUTH ONCE DAILY    MIDODRINE (PROAMATINE) 2.5 MG TAB    Take 1  tab breakfast  And  supper    MULTIVITAMIN CAPSULE    Take 1 capsule by mouth once daily.    NITROGLYCERIN (NITROSTAT) 0.4 MG SL TABLET    Place 1 tablet (0.4 mg total) under the tongue every 5 (five) minutes as needed for Chest pain.    PANTOPRAZOLE (PROTONIX) 40 MG TABLET    Take 1 tablet (40 mg total) by mouth once daily.    TEMAZEPAM (RESTORIL) 7.5 MG CAP    Take 7.5 mg by mouth nightly.    TRAMADOL (ULTRAM) 50 MG TABLET    TAKE 1 TABLET(50 MG) BY MOUTH EVERY 6 HOURS AS NEEDED FOR PAIN    TRAZODONE (DESYREL) 50 MG TABLET    1 tablet QHS prn insomnia    VITAMIN D (VITAMIN D3) 1000 UNITS TAB    Take 1,000 Units by mouth once daily.           Assessment:       1. Coronary artery disease involving native coronary artery of native heart without angina pectoris    vertigo,  Orthostatic hypotension   ? Side effect  lexapro  , check event monitor for cardiac dysrhythmia at today's got left bundle-branch block with bradycardia off of metoprolol  vertebrobasialr insuffiiciency /    severe sciatica   Left leg   The dizziness he is having is partially orthostatic in his blood pressure medicines including metoprolol been discontinued and he is on midodrine  The dizziness may be partially secondary to pain that he has in the left sciatic area when he stands up causing slight vagal symptoms  Some of the dizziness may be secondary to mental status changes  I did not check his ears  I personally  reviewed old and new ecg's, lab reports,, xray reports  and  other cardiovascular studies including  echo's, stress tests, angiogram reports, holters,and vascular studies .  In addition I evaluated original cardiac cath  ___echo  ____cxr ______ct ____scan on [employer] or [employer] or other viewing platforms .  I reviewed  office and hospital notes Yes _x___ of  referring providers.    Plan:   Event monitor ,  Carotid -check carotids for vertebral basilar insufficiency'orthostasis   ' cut lexapro  To  10 a day     Problem List Items Addressed This Visit    None     Visit Diagnoses     Coronary artery disease involving native coronary artery of native heart without angina pectoris    -  Primary    Relevant Orders    IN OFFICE EKG 12-LEAD (to [facility])           No follow-ups on file.

## 2022-07-21 ENCOUNTER — PATIENT MESSAGE (OUTPATIENT)
Dept: SPINE | Facility: CLINIC | Age: 82
End: 2022-07-21
Payer: MEDICARE

## 2022-07-21 NOTE — TELEPHONE ENCOUNTER
All 3 of those medications could potentially be contributing to his dizziness.  They can discontinue any or all of them at their discretion.  They do not need to be tapered

## 2022-07-25 ENCOUNTER — PATIENT MESSAGE (OUTPATIENT)
Dept: SPINE | Facility: CLINIC | Age: 82
End: 2022-07-25
Payer: MEDICARE

## 2022-07-26 ENCOUNTER — HOSPITAL ENCOUNTER (OUTPATIENT)
Dept: RADIOLOGY | Facility: HOSPITAL | Age: 82
Discharge: HOME | End: 2022-07-26
Attending: SPECIALIST
Payer: MEDICARE

## 2022-07-26 DIAGNOSIS — G45.0 VERTEBRAL ARTERY INSUFFICIENCY: ICD-10-CM

## 2022-07-26 PROCEDURE — 93880 EXTRACRANIAL BILAT STUDY: CPT | Mod: 26,,, | Performed by: RADIOLOGY

## 2022-07-26 PROCEDURE — 93880 US CAROTID BILATERAL: ICD-10-PCS | Mod: 26,,, | Performed by: RADIOLOGY

## 2022-07-26 PROCEDURE — 93880 EXTRACRANIAL BILAT STUDY: CPT | Mod: TC

## 2022-07-29 ENCOUNTER — HOSPITAL ENCOUNTER (OUTPATIENT)
Facility: AMBULARY SURGERY CENTER | Age: 82
Discharge: HOME | End: 2022-07-29
Attending: ANESTHESIOLOGY | Admitting: ANESTHESIOLOGY
Payer: MEDICARE

## 2022-07-29 DIAGNOSIS — M54.16 LUMBAR RADICULITIS: Primary | ICD-10-CM

## 2022-07-29 PROCEDURE — 62323 PR INJ LUMBAR/SACRAL, W/IMAGING GUIDANCE: ICD-10-PCS | Mod: ,,, | Performed by: ANESTHESIOLOGY

## 2022-07-29 PROCEDURE — 62323 NJX INTERLAMINAR LMBR/SAC: CPT | Performed by: ANESTHESIOLOGY

## 2022-07-29 PROCEDURE — 62323 NJX INTERLAMINAR LMBR/SAC: CPT | Mod: ,,, | Performed by: ANESTHESIOLOGY

## 2022-07-29 RX ORDER — SODIUM CHLORIDE, SODIUM LACTATE, POTASSIUM CHLORIDE, CALCIUM CHLORIDE 600; 310; 30; 20 MG/100ML; MG/100ML; MG/100ML; MG/100ML
INJECTION, SOLUTION INTRAVENOUS ONCE AS NEEDED
Status: COMPLETED | OUTPATIENT
Start: 2022-07-29 | End: 2022-07-29

## 2022-07-29 RX ORDER — SODIUM CHLORIDE 0.9 % (FLUSH) 0.9 %
SYRINGE (ML) INJECTION
Status: DISCONTINUED | OUTPATIENT
Start: 2022-07-29 | End: 2022-07-29 | Stop reason: HOSPADM

## 2022-07-29 RX ORDER — MIDAZOLAM HYDROCHLORIDE 1 MG/ML
INJECTION, SOLUTION INTRAMUSCULAR; INTRAVENOUS
Status: DISCONTINUED | OUTPATIENT
Start: 2022-07-29 | End: 2022-07-29 | Stop reason: HOSPADM

## 2022-07-29 RX ORDER — FENTANYL CITRATE 50 UG/ML
INJECTION, SOLUTION INTRAMUSCULAR; INTRAVENOUS
Status: DISCONTINUED | OUTPATIENT
Start: 2022-07-29 | End: 2022-07-29 | Stop reason: HOSPADM

## 2022-07-29 RX ORDER — LIDOCAINE HYDROCHLORIDE 10 MG/ML
INJECTION, SOLUTION EPIDURAL; INFILTRATION; INTRACAUDAL; PERINEURAL
Status: DISCONTINUED | OUTPATIENT
Start: 2022-07-29 | End: 2022-07-29 | Stop reason: HOSPADM

## 2022-07-29 RX ORDER — DEXAMETHASONE SODIUM PHOSPHATE 10 MG/ML
INJECTION INTRAMUSCULAR; INTRAVENOUS
Status: DISCONTINUED | OUTPATIENT
Start: 2022-07-29 | End: 2022-07-29 | Stop reason: HOSPADM

## 2022-07-29 RX ADMIN — SODIUM CHLORIDE, SODIUM LACTATE, POTASSIUM CHLORIDE, CALCIUM CHLORIDE: 600; 310; 30; 20 INJECTION, SOLUTION INTRAVENOUS at 12:07

## 2022-07-29 NOTE — OP NOTE
PROCEDURE DATE: 7/29/2022    Procedure:   Interlaminar epidural steroid injection at L5-S1 under fluoroscopic guidance.    Diagnosis: lUMBAR radiculitis  pOSTOP DIAGNOSIS: sAME    Physician: Janes Woodson M.D.    Medications injected:10 mg dexamethasone with 4 ml of preservative free NaCl    Local anesthetic injected:    Lidocaine 1% 2 ml total    Sedation Medications: RN IV sedation    Estimated blood loss:  NOne    Complications:  None    Technique:  Time-out taken to identify patient and procedure prior to starting the procedure.  With the patient laying in a prone position, the area was prepped and draped in the usual sterile fashion using ChloraPrep and a fenestrated drape.  After determining the target level with an AP fluoroscopic view, local anesthetic was given using a 25-gauge 1.5 inch needle by raising a wheal and then infiltrating toward the interlaminar entry space.  A 3.5inch 20-gauge Touhy needle was introduced under AP fluoroscopic guidance to the interlaminar space of L5-S1. Once the trajectory was established, the needle was visualized in the lateral view and advanced using loss of resistance technique. Once in the desired position, 1ml contrast was injected to confirm placement and there was no vascular uptake nor intrathecal spread.  The medication was then injected slowly. The patient tolerated the procedure well.      The patient was monitored after the procedure.   They were given post-procedure and discharge instructions to follow at home.  The patient was discharged in a stable condition.

## 2022-08-01 VITALS
DIASTOLIC BLOOD PRESSURE: 78 MMHG | HEART RATE: 61 BPM | BODY MASS INDEX: 20.83 KG/M2 | HEIGHT: 67 IN | WEIGHT: 132.69 LBS | RESPIRATION RATE: 16 BRPM | TEMPERATURE: 98 F | OXYGEN SATURATION: 96 % | SYSTOLIC BLOOD PRESSURE: 136 MMHG

## 2022-08-01 DIAGNOSIS — M54.42 ACUTE LEFT-SIDED LOW BACK PAIN WITH LEFT-SIDED SCIATICA: Primary | ICD-10-CM

## 2022-08-01 RX ORDER — TRAMADOL HYDROCHLORIDE 50 MG/1
TABLET ORAL
Qty: 28 TABLET | Refills: 0 | Status: SHIPPED | OUTPATIENT
Start: 2022-08-01 | End: 2022-12-04

## 2022-08-02 ENCOUNTER — CLINICAL SUPPORT (OUTPATIENT)
Dept: REHABILITATION | Facility: HOSPITAL | Age: 82
End: 2022-08-02
Attending: PHYSICAL MEDICINE & REHABILITATION
Payer: MEDICARE

## 2022-08-02 DIAGNOSIS — M54.42 ACUTE LEFT-SIDED LOW BACK PAIN WITH LEFT-SIDED SCIATICA: ICD-10-CM

## 2022-08-02 DIAGNOSIS — R26.89 DECREASED FUNCTIONAL MOBILITY: ICD-10-CM

## 2022-08-02 DIAGNOSIS — M62.89 MUSCLE TIGHTNESS: ICD-10-CM

## 2022-08-02 DIAGNOSIS — M25.60 DECREASED RANGE OF MOTION: ICD-10-CM

## 2022-08-02 DIAGNOSIS — R53.1 DECREASED STRENGTH: Primary | ICD-10-CM

## 2022-08-02 PROCEDURE — 97162 PT EVAL MOD COMPLEX 30 MIN: CPT | Mod: PN

## 2022-08-03 ENCOUNTER — PATIENT MESSAGE (OUTPATIENT)
Dept: CARDIOLOGY | Facility: CLINIC | Age: 82
End: 2022-08-03
Payer: MEDICARE

## 2022-08-03 DIAGNOSIS — I95.0 IDIOPATHIC HYPOTENSION: ICD-10-CM

## 2022-08-03 RX ORDER — MIDODRINE HYDROCHLORIDE 2.5 MG/1
TABLET ORAL
Qty: 180 TABLET | Refills: 1 | Status: SHIPPED | OUTPATIENT
Start: 2022-08-03 | End: 2022-10-04

## 2022-08-04 NOTE — PLAN OF CARE
"[facility]   Physical Therapy Initial Evaluation     Date: 8/2/2022   Name: Jarred Harrison  Clinic Number: [id number]    Therapy Diagnosis:   Encounter Diagnoses   Name Primary?    Acute left-sided low back pain with left-sided sciatica     Decreased strength Yes    Decreased functional mobility     Muscle tightness     Decreased range of motion      Physician: Jeyson Mccann MD    Physician Orders: PT Eval and Treat   Medical Diagnosis from Referral: M54.42 (ICD-10-CM) - Acute left-sided low back pain with left-sided sciatica  Evaluation Date: 8/2/2022  Authorization Period Expiration: 08/01/2023  Plan of Care Expiration: 9/30/2022  Progress Note Due: 9/2/2022  Visit # / Visits authorized: 1/ 1     FOTO visit #: 1  FOTO score: 34            Precautions: Blood pressure instability, hard of hearing, risk for falls, cognitive impairment     Time In: 1035  Time Out: 1115  Total Appointment Time (timed & untimed codes): 40 minutes      SUBJECTIVE   Date of onset: Approximately 2 months ago     History of Present Illness: Jarred presents for outpatient physical therapy following referral by Dr. Jeyson Mccann for evaluation of impairments related to Acute left-sided low back pain with left-sided sciatica. He arrived to clinic with his daughter, Lawrence. Patient reports he originally injured his back while assisting his wife (catch her from falling).  He was evaluated by physical therapist at our orthopedic therapy clinic (6/27/2022); however, he reports he may have "re-injured" his back since that appointment. Patient reports pain with walking and standing, limiting his activities of daily living. Patient also reports pain with using the restroom. Patient had a spinal injection last Friday, but reports no improvement yet.     Patient's daughter reports recent increase in confusion. Patient is hard of hearing and has episodic hypotension. Patient's wife has recently been diagnosed " "with bladder cancer. Mr. Harrison is her primary caregiver.         Falls: Patient reports one fall in the past 6 months, due to a "weak spell".     Imaging   MRI studies: 7/02/2022    IMPRESSION:  1. Left midline and subarticular L5-S1 disc extrusion compressing left S1 nerve roots. Correlation for left S1 radiculopathy is requested.  2. Left extraforaminal L2-L3 disc protrusion compressing left extraforaminal L2 spinal nerve. Correlation for left L2 radiculopathy is requested.  3. Moderate L3-L4 and severe L4-L5 central canal narrowing.  4. Extraforaminal components of L3-L4 disc bulge contacting bilateral extraforaminal L3 spinal nerves.  5. Right foraminal protrusive component of L4-L5 disc bulge contacting right extraforaminal L4 spinal nerve.  6. Apparent enlargement of hypointensity of left kidney, suboptimally evaluated on this exam, most likely representing a cyst. Correlation with nonemergent bilateral renal ultrasound is recommended to confirm this represents a simple cyst.    Prior Therapy: Patient has participated in physical therapy initial evaluation at our orthopedic clinic (6/27/2022).  Social History: Patient lives with his wife in a single story home. Small step to enter, ramp built.   Occupation: Retired  Prior Level of Function: Patient was fully independent prior to onset of back pain. Reports he "used to walk a lot", but indicates he has decreased this activity in recent years as more time has been needed to care for his wife.   Current Level of Function: Patient is not driving. He is limited in his walking distance and standing time due to back pain.     Daughter reports that patient had a neurologic evaluation in April that indicated progressive memory loss. Daughter report he can go into episodes of "total confusion"     Pain:  Current 7/10, worst 8/10, best 6/10   Location: SI joint region/buttoks, radiates down his left lower extremity intermittently   Aggravating Factors: riding in a car "   Easing Factors: lying on the floor provided some relief     Patients goals: to improve back pain and mobility      Medical History:   Past Medical History:   Diagnosis Date    AI (aortic insufficiency)     Coronary artery disease     GERD (gastroesophageal reflux disease)     Hyperlipidemia     Hypertension     LBBB (left bundle branch block)     SSS (sick sinus syndrome)        Surgical History:   Jarred Harrison  has a past surgical history that includes Coronary angioplasty (08/29/82015); right elbow; Knee surgery (Left); Cataract extraction; Foot surgery (Right); Cardiac catheterization; Coronary stent placement (2015); and Epidural steroid injection (N/A, 7/29/2022).    Medications:   Jarred has a current medication list which includes the following prescription(s): ascorbic acid (vitamin c), aspirin, cetirizine, coenzyme q10, escitalopram oxalate, ezetimibe, gabapentin, glucosamine/chondroitin/c/pooja, glucosamine-chondroitin, hydrocortisone-pramoxine, hyoscyamine, latanoprost, midodrine, multivitamin, nitroglycerin, pantoprazole, tramadol, trazodone, and vitamin d.    Allergies:   Review of patient's allergies indicates:   Allergen Reactions    Hydrocodone           OBJECTIVE     Vital signs:   134/71 mmHg  72 bpm     Observation: Patient arrived to session today in transport wheelchair.     Lumbar Range of Motion:    Degrees Pain   Flexion 40 degrees    Positive for pain        Extension WFL            Left Side Bending WFL         Right Side Bending Limited  Positive for pain        Left rotation   WFL         Right Rotation   Limited  Positive for pain             Lower Extremity Strength  Right LE  Left LE    Knee extension: 5/5 Knee extension: 3-/5   *see below   Knee flexion: Not tested Knee flexion: Not tested   Hip flexion: 4+/5 Hip flexion: 4+/5   Hip extension:   *assessed in supine  4/5 Hip extension: 3+/5   Hip abduction: 4/5 Hip abduction: 4/5   Ankle dorsiflexion: 5/5 Ankle  "dorsiflexion: 5/5   Ankle plantarflexion:  *assessed in supine  5/5 Ankle plantarflexion:  *assessed in supine 5/5     *Decreased left knee extension active range of motion in short sitting with report of pain.         Neuro Dynamic Testing:    Sciatic nerve:      SLR: R = negative      L = Positive       Flexibility:       Popliteal Angle:     Left = 24 degrees      SLR:   Left = 30 degrees    Right = 45 degrees       Gait Assessment:   - AD used: no assistive device   - Assistance: supervision   - Distance: 133 feet   - Speed: 1.2 m/s   - Stairs: Not assessed this date     Gait Analysis:  Upon observation of gait, patient demonstrates deviations including but not limited to: decreased step length bilaterally, decreased foot clearance bilaterally     Impairments contributing to deviations:  Decreased lower extremity strength, pain           FOTO Neuromuscular Condition Survey:       Therapist reviewed FOTO scores for Jarred Harrison on 8/2/2022.   FOTO documents entered into [employer] - see Media section.    Functional Status Score: 34            TREATMENT     Total Treatment time (time-based codes) separate from Evaluation: 0 minutes     No treatment provided this date secondary to time needed to gather subjective history, complete objective testing, assess functional mobility and perform standardized outcome measures.         PATIENT EDUCATION AND HOME EXERCISES     Education provided:   - Patient was provided with education re: goals and plan of care for physical therapy. Patient verbalized understanding and agreement with plan.     Written Home Exercises Provided: To be provided in first follow-up treatment session     ASSESSMENT     Jarred is a 82 y.o. male referred to outpatient Physical Therapy with a medical diagnosis of Acute left-sided low back pain with left-sided sciatica. Patient presents with signs and symptoms consistent with diagnosis and outlined in "thearpy diagnoses" section of initial evaluation. 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 During evaluation today, patient participated in assessment of lower extremity muscle strength via MMT. he presents with decreased strength in proximal muscle of bilateral  lower extremities. He displays decreased lower extremity flexibility and neural tension as evidenced by reproduction of pain with knee extension (sciatic nerve test and popliteal angle test). He ambulates with a normal gait speed, but reports he is limited in distance due to pain.     Mr. Harrison is a good candidate for outpatient physical therapy to address impairments identified in today's evaluation, increase his capacity for functional mobility and reduce pain. His daughter does report that Mr. Harrison has early signs of dementia which could impact his progress and require consideration with regards to methods of instruction and education during session.     Upon completion of evaluation, patient and his daughter were provided with education regarding goals and plan of care with which he/she/they verbalized understanding and agreement.         Patient prognosis is Fair.   Patient will benefit from skilled outpatient Physical Therapy to address the deficits stated above and in the chart below, provide patient /family education, and to maximize patientt's level of independence.     Plan of care discussed with patient: Yes  Patient's spiritual, cultural and educational needs considered and patient is agreeable to the plan of care and goals as stated below:     Anticipated Barriers for therapy: age, cognitive impairment     Medical Necessity is demonstrated by the following  History  Co-morbidities and personal factors that may impact the plan of care Co-morbidities:   AI (aortic insufficiency)   Coronary artery disease   GERD (gastroesophageal reflux disease)   Hyperlipidemia   Hypertension   LBBB (left bundle branch block)    SSS (sick sinus syndrome)       Personal Factors:   age     high   Examination  Body Structures and Functions, activity limitations and participation restrictions that may impact the plan of care Body Regions:   back  lower extremities    Body Systems:    ROM  strength  gross coordinated movement  gait  heart rate  blood pressure    Participation Restrictions:   Patient is limited in walking distance and standing time due to back pain.     Activity limitations:   Learning and applying knowledge  no deficits    General Tasks and Commands  no deficits    Communication  no deficits    Mobility  walking    Self care  no deficits    Domestic Life  doing house work (cleaning house, washing dishes, laundry)    Interactions/Relationships  no deficits    Life Areas  no deficits    Community and Social Life  unknown         high   Clinical Presentation evolving clinical presentation with changing clinical characteristics moderate   Decision Making/ Complexity Score: moderate     Goals:    Short Term Goals: (4 weeks) Date last assessed: Status:    1. Patient will be provided with an individualized home exercise program.  8/2/2022 New   2.  Patient will demonstrate improve endurance and activity tolerance as evidenced by ability to perform Nu-step x 10 minutes without rests breaks with heart rate post-activity equivalent to 50-80% of maximum heart rate (or comparable rate of percieved exertion).  8/2/2022 New   3.  Patient will decrease complaint of worst low back pain from 8/10 to 7/10 to decrease interference from pain with functional activities. (MDIC for chronic musculoskeletal pain = 1 point) 8/2/2022 New           Long Term Goals: (4 weeks) Date last assessed: Status:    1. Patient will be independent and compliant with updated home exercise program. 8/2/2022 New   2. Patient will demonstrate improve endurance and activity tolerance as evidenced by ability to perform Nu-step x 15minutes without rests breaks with heart  rate post-activity equivalent to 50-80% of maximum heart rate (or comparable rate of percieved exertion).   8/2/2022    3. Patient will increased lower extremity strength as evidenced by increase in manual muscle test  by 1/2 grade as appropriate to decrease gait deviations consistently.  8/2/2022   New     4. The patient will be provided with any equipment and/or orthotic evaluations and recommendations as indicated on an ongoing basis prior to discharge from therapy.  8/2/2022 New   5. Patient will improve his FOTO score from 34%  to at least 52% for improved self perception of functional mobility.(score 0-100, high score indicates greater level of function)   8/2/2022 New   6. Patient will decrease complaint of worst low back pain from 8/10 to 7/10 to decrease interference from pain with functional activities. (MDIC for chronic musculoskeletal pain = 1 point)   8/2/2022 New         PLAN   Plan of care Certification: 8/2/2022 to 9/30/2022.    Outpatient Physical Therapy 2 times weekly for 8 weeks to include the following interventions: Cervical/Lumbar Traction, Electrical Stimulation (as indicated and cleared for contraindication) , Gait Training, Manual Therapy, Moist Heat/ Ice, Neuromuscular Re-ed, Orthotic Management and Training, Patient Education, Therapeutic Activities, Therapeutic Exercise and Ultrasound.     MICHAEL WORKMAN, PT      I CERTIFY THE NEED FOR THESE SERVICES FURNISHED UNDER THIS PLAN OF TREATMENT AND WHILE UNDER MY CARE   Physician's comments:     Physician's Signature: ___________________________________________________

## 2022-08-09 ENCOUNTER — CLINICAL SUPPORT (OUTPATIENT)
Dept: REHABILITATION | Facility: HOSPITAL | Age: 82
End: 2022-08-09
Payer: MEDICARE

## 2022-08-09 DIAGNOSIS — R26.89 DECREASED FUNCTIONAL MOBILITY: ICD-10-CM

## 2022-08-09 DIAGNOSIS — M62.89 MUSCLE TIGHTNESS: ICD-10-CM

## 2022-08-09 DIAGNOSIS — R53.1 DECREASED STRENGTH: ICD-10-CM

## 2022-08-09 DIAGNOSIS — M25.60 DECREASED RANGE OF MOTION: Primary | ICD-10-CM

## 2022-08-09 PROCEDURE — 97140 MANUAL THERAPY 1/> REGIONS: CPT | Mod: PN

## 2022-08-09 PROCEDURE — 97110 THERAPEUTIC EXERCISES: CPT | Mod: PN

## 2022-08-09 NOTE — PROGRESS NOTES
[facility]   Physical Therapy Treatment Note     [facility]  [facility] Number: [id number]    Therapy Diagnosis:   Encounter Diagnoses   Name Primary?    Decreased range of motion Yes    Muscle tightness     Decreased functional mobility     Decreased strength      Physician: Jeyson Mccann MD    Visit Date: 8/9/2022     Physician Orders: PT Eval and Treat   Medical Diagnosis from Referral: M54.42 (ICD-10-CM) - Acute left-sided low back pain with left-sided sciatica  Evaluation Date: 8/2/2022  Authorization Period Expiration: 08/01/2023  Plan of Care Expiration: 9/30/2022  Progress Note Due: 9/2/2022  Visit # / Visits authorized: 2/20      Precautions: Blood pressure instability, hard of hearing, risk for falls, cognitive impairment, impaired vision     Time In: 0730  Time Out: 0815  Total Appointment Time (timed & untimed codes): 45 minutes     SUBJECTIVE     Pt reports: hurting all the time. Admits having poor vision..  He was not given home exercise program yet.  Response to previous treatment: on initial visit  Functional change: none    Pain: 6/10  Location: rectum and left buttocks     OBJECTIVE     Ambulates with heel drag.  Tight hamstrings.      Treatment     Jarred received the treatments listed below:      therapeutic exercises to develop flexibility and core stabilization for 30 minutes including:  Nustep L1 10' all 4's  Gentle hamstring stretches  Posterior pelvic tilt 20  Single knee to chest 10/10  Double knee to chest 10  Trunk rotation using theraball 20/20    Manual therapy 15 includes  neural glides. 20  Manual traction and relaxation      Patient Education and Home Exercises     Home Exercises Provided and Patient Education Provided     Education provided:   -Home Exercises Instructions    Written Home Exercises Provided: yes. Exercises were reviewed and Jarred was able to demonstrate them prior to the end of the session.  Jarred demonstrated fair   understanding of the education provided. See EMR under Patient Instructions for exercises provided during therapy sessions    ASSESSMENT     Patient's daughter wants to take it very easy on her father. Mr. Stern has a hard time relaxing as he anticipates and help each movements during stretch. Performed pelvic tilt very well with occasional inconsistency in breathing sequence. Straight leg raise to 60 deg  R>L  Tolerated Rx well.    Jarred Is progressing well towards his goals.   Pt prognosis is Good.     Pt will continue to benefit from skilled outpatient physical therapy to address the deficits listed in the problem list box on initial evaluation, provide pt/family education and to maximize pt's level of independence in the home and community environment.     Pt's spiritual, cultural and educational needs considered and pt agreeable to plan of care and goals.     Anticipated barriers to physical therapy: carryover of skills learned    Goals:     Short Term Goals: (4 weeks) Status:    1. Patient will be provided with an individualized home exercise program.  met   2.  Patient will demonstrate improve endurance and activity tolerance as evidenced by ability to perform Nu-step x 10 minutes without rests breaks with heart rate post-activity equivalent to 50-80% of maximum heart rate (or comparable rate of percieved exertion).  ongoing   3.  Patient will decrease complaint of worst low back pain from 8/10 to 7/10 to decrease interference from pain with functional activities. (MDIC for chronic musculoskeletal pain = 1 point) ongoing               Long Term Goals: (4 weeks) Status:    1. Patient will be independent and compliant with updated home exercise program. ongoing   2. Patient will demonstrate improve endurance and activity tolerance as evidenced by ability to perform Nu-step x 15minutes without rests breaks with heart rate post-activity equivalent to 50-80% of maximum heart rate (or comparable rate of  percieved exertion).    ongoing   3. Patient will increased lower extremity strength as evidenced by increase in manual muscle test  by 1/2 grade as appropriate to decrease gait deviations consistently.  ongoing      4. The patient will be provided with any equipment and/or orthotic evaluations and recommendations as indicated on an ongoing basis prior to discharge from therapy.  ongoing   5. Patient will improve his FOTO score from 34%  to at least 52% for improved self perception of functional mobility.(score 0-100, high score indicates greater level of function)    ongoing   6. Patient will decrease complaint of worst low back pain from 8/10 to 7/10 to decrease interference from pain with functional activities. (MDIC for chronic musculoskeletal pain = 1 point)    ongoing             PLAN     Gentle stretching, relaxation, manual traction  Continue Plan of care    James Moreira, PT

## 2022-08-11 ENCOUNTER — CLINICAL SUPPORT (OUTPATIENT)
Dept: REHABILITATION | Facility: HOSPITAL | Age: 82
End: 2022-08-11
Payer: MEDICARE

## 2022-08-11 ENCOUNTER — PATIENT MESSAGE (OUTPATIENT)
Dept: CARDIOLOGY | Facility: CLINIC | Age: 82
End: 2022-08-11
Payer: MEDICARE

## 2022-08-11 DIAGNOSIS — M62.89 MUSCLE TIGHTNESS: ICD-10-CM

## 2022-08-11 DIAGNOSIS — M25.60 DECREASED RANGE OF MOTION: Primary | ICD-10-CM

## 2022-08-11 DIAGNOSIS — R53.1 DECREASED STRENGTH: ICD-10-CM

## 2022-08-11 DIAGNOSIS — R26.89 DECREASED FUNCTIONAL MOBILITY: ICD-10-CM

## 2022-08-11 PROCEDURE — 97110 THERAPEUTIC EXERCISES: CPT | Mod: PN,CQ

## 2022-08-11 NOTE — PROGRESS NOTES
[facility]   Physical Therapy Treatment Note     Name: Jarred Hoytcarlos alberto  Clinic Number: [id number]    Therapy Diagnosis:   Encounter Diagnoses   Name Primary?    Decreased range of motion Yes    Muscle tightness     Decreased functional mobility     Decreased strength      Physician: Jeyson Mccann MD    Visit Date: 8/11/2022     Physician Orders: PT Eval and Treat   Medical Diagnosis from Referral: M54.42 (ICD-10-CM) - Acute left-sided low back pain with left-sided sciatica  Evaluation Date: 8/2/2022  Authorization Period Expiration: 08/01/2023  Plan of Care Expiration: 9/30/2022  Progress Note Due: 9/2/2022  Visit # / Visits authorized: 3/20  PTA visit # -1/5    FOTO:  Eval- 34/100      Precautions: Blood pressure instability, hard of hearing, risk for falls, cognitive impairment, impaired vision     Time In: 1635  Time Out: 1720  Total Appointment Time: 45 minutes     SUBJECTIVE     Pt reports: he's a little sore today but he thinks he did his exercises too many times yesterday at home.  Pt describes his discomfort as muscle offenses.  He was not given home exercise program yet.  Response to previous treatment: on initial visit  Functional change: none    Pain:  8-9/10  Location: Left hip     OBJECTIVE       Treatment     Jarred received the treatments listed below:      therapeutic exercises to develop flexibility and core stabilization for 40 minutes including:    Nustep x 6' level 2, bilateral upper extremity/ lower extremity (increased pain in Left hip)    Gentle hamstring stretches 3 x 20 seconds bilateral lower extremity   Posterior pelvic tilt x 20 reps   Bridging 2 x 10 reps   Single knee to chest x 10 reps with 10 seconds hold  Double knee to chest 10 reps with 10 seconds hold  Trunk rotation x 20 reps bilateral   Manual Piriformis stretch 3 x 15 seconds Left lower extremity  Manual IT band stretch 3 x 15 seconds Left lower extremity     Manual therapy x 5  includes  Soft Tissue Mobs, therabar      Patient Education and Home Exercises     Home Exercises Provided and Patient Education Provided     Education provided:   -Educated pt on applying ice if delayed muscle soreness occurs.(Discussed with daughter also)  -Educated patient to remove his wallet from his back Left pocket when he is sitting.(Discussed with daughter also)  -Tennis ball to Left hip to help allleviate pain.  (Discussed with daughter also)  -Decrease number of times he performs exercises to 1/day.(Discussed with daughter also)    Written Home Exercises Provided: yes. Exercises were reviewed and Jarred was able to demonstrate them prior to the end of the session.  Jarred demonstrated fair  understanding of the education provided. See EMR under Patient Instructions for exercises provided during therapy sessions    ASSESSMENT   Jarred provided good effort and participation toward therapeutic interventions today with focus on low back exercises and lower extremity strengthening.  Pt appears pleasantly confused.  Verbal cuing needed consistently for technique with exercises.  Pt did report decreased soreness following manual therapy to Left hip.      Jarred Is progressing well towards his goals.   Pt prognosis is Good.     Pt will continue to benefit from skilled outpatient physical therapy to address the deficits listed in the problem list box on initial evaluation, provide pt/family education and to maximize pt's level of independence in the home and community environment.     Pt's spiritual, cultural and educational needs considered and pt agreeable to plan of care and goals.     Anticipated barriers to physical therapy: carryover of skills learned    Goals:     Short Term Goals: (4 weeks) Status:    1. Patient will be provided with an individualized home exercise program.  met   2.  Patient will demonstrate improve endurance and activity tolerance as evidenced by ability to perform Nu-step x 10  minutes without rests breaks with heart rate post-activity equivalent to 50-80% of maximum heart rate (or comparable rate of percieved exertion).  ongoing   3.  Patient will decrease complaint of worst low back pain from 8/10 to 7/10 to decrease interference from pain with functional activities. (MDIC for chronic musculoskeletal pain = 1 point) ongoing               Long Term Goals: (4 weeks) Status:    1. Patient will be independent and compliant with updated home exercise program. ongoing   2. Patient will demonstrate improve endurance and activity tolerance as evidenced by ability to perform Nu-step x 15minutes without rests breaks with heart rate post-activity equivalent to 50-80% of maximum heart rate (or comparable rate of percieved exertion).    ongoing   3. Patient will increased lower extremity strength as evidenced by increase in manual muscle test  by 1/2 grade as appropriate to decrease gait deviations consistently.  ongoing      4. The patient will be provided with any equipment and/or orthotic evaluations and recommendations as indicated on an ongoing basis prior to discharge from therapy.  ongoing   5. Patient will improve his FOTO score from 34%  to at least 52% for improved self perception of functional mobility.(score 0-100, high score indicates greater level of function)    ongoing   6. Patient will decrease complaint of worst low back pain from 8/10 to 7/10 to decrease interference from pain with functional activities. (MDIC for chronic musculoskeletal pain = 1 point)    ongoing             PLAN     Gentle stretching, relaxation, manual traction  Continue Plan of care    Rosa Mendoza PTA

## 2022-08-16 ENCOUNTER — CLINICAL SUPPORT (OUTPATIENT)
Dept: REHABILITATION | Facility: HOSPITAL | Age: 82
End: 2022-08-16
Payer: MEDICARE

## 2022-08-16 DIAGNOSIS — M62.89 MUSCLE TIGHTNESS: ICD-10-CM

## 2022-08-16 DIAGNOSIS — R26.89 DECREASED FUNCTIONAL MOBILITY: ICD-10-CM

## 2022-08-16 DIAGNOSIS — R53.1 DECREASED STRENGTH: ICD-10-CM

## 2022-08-16 DIAGNOSIS — M25.60 DECREASED RANGE OF MOTION: Primary | ICD-10-CM

## 2022-08-16 PROCEDURE — 97140 MANUAL THERAPY 1/> REGIONS: CPT | Mod: PN

## 2022-08-16 PROCEDURE — 97110 THERAPEUTIC EXERCISES: CPT | Mod: PN

## 2022-08-16 NOTE — PROGRESS NOTES
[facility]   Physical Therapy Treatment Note     [facility]  [facility] Number: [id number]    Therapy Diagnosis:   Encounter Diagnoses   Name Primary?    Decreased range of motion Yes    Muscle tightness     Decreased functional mobility     Decreased strength      Physician: Jeyson Mccann MD    Visit Date: 8/16/2022     Physician Orders: PT Eval and Treat   Medical Diagnosis from Referral: M54.42 (ICD-10-CM) - Acute left-sided low back pain with left-sided sciatica  Evaluation Date: 8/2/2022  Authorization Period Expiration: 08/01/2023  Plan of Care Expiration: 9/30/2022  Progress Note Due: 9/2/2022  Visit # / Visits authorized: 4/20      Precautions: Blood pressure instability, hard of hearing, risk for falls, cognitive impairment, impaired vision     Time In: 1330  Time Out: 1415  Total Appointment Time (timed & untimed codes): 45 minutes     SUBJECTIVE     Pt reports: hurting all the time. Admits having poor vision..  He was not given home exercise program yet.  Response to previous treatment: on initial visit  Functional change: none    Pain: 6/10  Location: rectum and left buttocks     OBJECTIVE   .  Tight hamstrings.      Treatment     Jarred received the treatments listed below:      therapeutic exercises to develop flexibility and core stabilization for 30 minutes including:  Nustep L1 10' all 4's  Gentle hamstring stretches  Posterior pelvic tilt 20  Single knee to chest 10/10  Double knee to chest 10  Piriformis stretches    Manual therapy 15 includes  neural glides. 20  Manual traction and relaxation      Patient Education and Home Exercises     Home Exercises Provided and Patient Education Provided     Education provided:   -Home Exercises Instructions    Written Home Exercises Provided: yes. Exercises were reviewed and Jarred was able to demonstrate them prior to the end of the session.  Jarred demonstrated fair  understanding of the education provided.  See EMR under Patient Instructions for exercises provided during therapy sessions    ASSESSMENT     Patient's states her father was confused and hurting about what to do with the tennis balls. Mr. Stern said he must have pressed too hard to using the tennis balls. Instructed on elias the ball on point of pressure not hold pressure. Increased pain with stretching. reief on traction.  Tolerated Rx..    Jarred Is progressing well towards his goals.   Pt prognosis is Good.     Pt will continue to benefit from skilled outpatient physical therapy to address the deficits listed in the problem list box on initial evaluation, provide pt/family education and to maximize pt's level of independence in the home and community environment.     Pt's spiritual, cultural and educational needs considered and pt agreeable to plan of care and goals.     Anticipated barriers to physical therapy: carryover of skills learned    Goals:     Short Term Goals: (4 weeks) Status:    1. Patient will be provided with an individualized home exercise program.  met   2.  Patient will demonstrate improve endurance and activity tolerance as evidenced by ability to perform Nu-step x 10 minutes without rests breaks with heart rate post-activity equivalent to 50-80% of maximum heart rate (or comparable rate of percieved exertion).  ongoing   3.  Patient will decrease complaint of worst low back pain from 8/10 to 7/10 to decrease interference from pain with functional activities. (MDIC for chronic musculoskeletal pain = 1 point) ongoing               Long Term Goals: (4 weeks) Status:    1. Patient will be independent and compliant with updated home exercise program. ongoing   2. Patient will demonstrate improve endurance and activity tolerance as evidenced by ability to perform Nu-step x 15minutes without rests breaks with heart rate post-activity equivalent to 50-80% of maximum heart rate (or comparable rate of percieved exertion).    ongoing   3.  Patient will increased lower extremity strength as evidenced by increase in manual muscle test  by 1/2 grade as appropriate to decrease gait deviations consistently.  ongoing      4. The patient will be provided with any equipment and/or orthotic evaluations and recommendations as indicated on an ongoing basis prior to discharge from therapy.  ongoing   5. Patient will improve his FOTO score from 34%  to at least 52% for improved self perception of functional mobility.(score 0-100, high score indicates greater level of function)    ongoing   6. Patient will decrease complaint of worst low back pain from 8/10 to 7/10 to decrease interference from pain with functional activities. (MDIC for chronic musculoskeletal pain = 1 point)    ongoing             PLAN     Gentle stretching, relaxation, manual traction  Continue Plan of care    James Moreira, PT

## 2022-08-16 NOTE — PROGRESS NOTES
PT/PTA met face to face to discuss pt's treatment plan and progress towards established goals. Pt will be seen by a physical therapist minimally every 6th visit or every 30 days.      Rosa Mendoza PTA

## 2022-08-17 ENCOUNTER — TELEPHONE (OUTPATIENT)
Dept: CARDIOLOGY | Facility: CLINIC | Age: 82
End: 2022-08-17
Payer: MEDICARE

## 2022-08-17 ENCOUNTER — PATIENT MESSAGE (OUTPATIENT)
Dept: FAMILY MEDICINE | Facility: CLINIC | Age: 82
End: 2022-08-17

## 2022-08-17 NOTE — TELEPHONE ENCOUNTER
S/w pts daughter and BP 83/50 after taking midodrine. Last night he was kind of mentally out of it. He is extremely cold .  Pt had event monitor recently and need results. His pcp doesn't want to do anything until he knows what is going on with his heart. Advised I will have someone review it & let them know whats going on and call back

## 2022-08-17 NOTE — TELEPHONE ENCOUNTER
S/w pts daughter page & advised if he is feeling bad to go to ER. She said he isnt dizzy. Offered nurse visit today but they declined and want him to see a dr. Slater appt with dr almendarez for tomorrow at 1pm

## 2022-08-17 NOTE — TELEPHONE ENCOUNTER
----- Message from Hema Oneal sent at 8/17/2022  9:48 AM CDT -----  Type:  Sooner Appointment Request    Caller is requesting a sooner appointment.  Caller declined first available appointment listed below.  Caller will not accept being placed on the waitlist and is requesting a message be sent to doctor.    Name of Caller:  Pt's [facility]queug[facility] - [facility]  When is the first available appointment?       Symptoms:  Low bp 83/53     Best Call Back Number:  [phone]    Additional Information:  sts they want to get him seen asap he's not been doing well.  Please advise -- Thank you

## 2022-08-18 ENCOUNTER — OFFICE VISIT (OUTPATIENT)
Dept: CARDIOLOGY | Facility: CLINIC | Age: 82
End: 2022-08-18
Payer: MEDICARE

## 2022-08-18 ENCOUNTER — CLINICAL SUPPORT (OUTPATIENT)
Dept: REHABILITATION | Facility: HOSPITAL | Age: 82
End: 2022-08-18
Payer: MEDICARE

## 2022-08-18 VITALS
WEIGHT: 128.31 LBS | OXYGEN SATURATION: 98 % | HEIGHT: 67 IN | BODY MASS INDEX: 20.14 KG/M2 | DIASTOLIC BLOOD PRESSURE: 70 MMHG | SYSTOLIC BLOOD PRESSURE: 122 MMHG | HEART RATE: 79 BPM

## 2022-08-18 DIAGNOSIS — R53.1 DECREASED STRENGTH: ICD-10-CM

## 2022-08-18 DIAGNOSIS — G30.1 LATE ONSET ALZHEIMER'S DEMENTIA WITHOUT BEHAVIORAL DISTURBANCE: ICD-10-CM

## 2022-08-18 DIAGNOSIS — R26.89 DECREASED FUNCTIONAL MOBILITY: ICD-10-CM

## 2022-08-18 DIAGNOSIS — I47.10 PSVT (PAROXYSMAL SUPRAVENTRICULAR TACHYCARDIA): ICD-10-CM

## 2022-08-18 DIAGNOSIS — R63.0 ANOREXIA: ICD-10-CM

## 2022-08-18 DIAGNOSIS — T68.XXXD HYPOTHERMIA, SUBSEQUENT ENCOUNTER: ICD-10-CM

## 2022-08-18 DIAGNOSIS — F02.80 LATE ONSET ALZHEIMER'S DEMENTIA WITHOUT BEHAVIORAL DISTURBANCE: ICD-10-CM

## 2022-08-18 DIAGNOSIS — M62.89 MUSCLE TIGHTNESS: ICD-10-CM

## 2022-08-18 DIAGNOSIS — I95.1 ORTHOSTATIC HYPOTENSION: Primary | ICD-10-CM

## 2022-08-18 DIAGNOSIS — D51.3 OTHER DIETARY VITAMIN B12 DEFICIENCY ANEMIA: ICD-10-CM

## 2022-08-18 DIAGNOSIS — M25.60 DECREASED RANGE OF MOTION: Primary | ICD-10-CM

## 2022-08-18 PROCEDURE — 1101F PR PT FALLS ASSESS DOC 0-1 FALLS W/OUT INJ PAST YR: ICD-10-PCS | Mod: CPTII,S$GLB,, | Performed by: SPECIALIST

## 2022-08-18 PROCEDURE — 1159F PR MEDICATION LIST DOCUMENTED IN MEDICAL RECORD: ICD-10-PCS | Mod: CPTII,S$GLB,, | Performed by: SPECIALIST

## 2022-08-18 PROCEDURE — 3288F PR FALLS RISK ASSESSMENT DOCUMENTED: ICD-10-PCS | Mod: CPTII,S$GLB,, | Performed by: SPECIALIST

## 2022-08-18 PROCEDURE — 3074F SYST BP LT 130 MM HG: CPT | Mod: CPTII,S$GLB,, | Performed by: SPECIALIST

## 2022-08-18 PROCEDURE — 3078F DIAST BP <80 MM HG: CPT | Mod: CPTII,S$GLB,, | Performed by: SPECIALIST

## 2022-08-18 PROCEDURE — 1160F PR REVIEW ALL MEDS BY PRESCRIBER/CLIN PHARMACIST DOCUMENTED: ICD-10-PCS | Mod: CPTII,S$GLB,, | Performed by: SPECIALIST

## 2022-08-18 PROCEDURE — 1159F MED LIST DOCD IN RCRD: CPT | Mod: CPTII,S$GLB,, | Performed by: SPECIALIST

## 2022-08-18 PROCEDURE — 99215 OFFICE O/P EST HI 40 MIN: CPT | Mod: S$GLB,,, | Performed by: SPECIALIST

## 2022-08-18 PROCEDURE — 97014 ELECTRIC STIMULATION THERAPY: CPT | Mod: PN,CQ

## 2022-08-18 PROCEDURE — 3078F PR MOST RECENT DIASTOLIC BLOOD PRESSURE < 80 MM HG: ICD-10-PCS | Mod: CPTII,S$GLB,, | Performed by: SPECIALIST

## 2022-08-18 PROCEDURE — 99215 PR OFFICE/OUTPT VISIT, EST, LEVL V, 40-54 MIN: ICD-10-PCS | Mod: S$GLB,,, | Performed by: SPECIALIST

## 2022-08-18 PROCEDURE — 97110 THERAPEUTIC EXERCISES: CPT | Mod: PN,CQ

## 2022-08-18 PROCEDURE — 1126F PR PAIN SEVERITY QUANTIFIED, NO PAIN PRESENT: ICD-10-PCS | Mod: CPTII,S$GLB,, | Performed by: SPECIALIST

## 2022-08-18 PROCEDURE — 1160F RVW MEDS BY RX/DR IN RCRD: CPT | Mod: CPTII,S$GLB,, | Performed by: SPECIALIST

## 2022-08-18 PROCEDURE — 1126F AMNT PAIN NOTED NONE PRSNT: CPT | Mod: CPTII,S$GLB,, | Performed by: SPECIALIST

## 2022-08-18 PROCEDURE — 3074F PR MOST RECENT SYSTOLIC BLOOD PRESSURE < 130 MM HG: ICD-10-PCS | Mod: CPTII,S$GLB,, | Performed by: SPECIALIST

## 2022-08-18 PROCEDURE — 1101F PT FALLS ASSESS-DOCD LE1/YR: CPT | Mod: CPTII,S$GLB,, | Performed by: SPECIALIST

## 2022-08-18 PROCEDURE — 3288F FALL RISK ASSESSMENT DOCD: CPT | Mod: CPTII,S$GLB,, | Performed by: SPECIALIST

## 2022-08-18 RX ORDER — FLUDROCORTISONE ACETATE 0.1 MG/1
100 TABLET ORAL DAILY
Qty: 30 TABLET | Refills: 5 | Status: SHIPPED | OUTPATIENT
Start: 2022-08-18 | End: 2022-09-17

## 2022-08-18 NOTE — PROGRESS NOTES
[facility]   Physical Therapy Treatment Note     Name: Jarred Hoytcarlos alberto  Clinic Number: [id number]    Therapy Diagnosis:   Encounter Diagnoses   Name Primary?    Decreased range of motion Yes    Muscle tightness     Decreased functional mobility     Decreased strength      Physician: Jeyson Mccann MD    Visit Date: 8/18/2022     Physician Orders: PT Eval and Treat   Medical Diagnosis from Referral: M54.42 (ICD-10-CM) - Acute left-sided low back pain with left-sided sciatica  Evaluation Date: 8/2/2022  Authorization Period Expiration: 08/01/2023  Plan of Care Expiration: 9/30/2022  Progress Note Due: 9/2/2022  Visit # / Visits authorized: 5/20  PTA visit # 1/5        Precautions: Blood pressure instability, hard of hearing, risk for falls, cognitive impairment, impaired vision     Time In: 1030  Time Out: 1116  Total Appointment Time: 46 minutes     SUBJECTIVE     Pt reports: Pt's daughter reports patient was in a lot of pain last night and this morning and expressing concern about therapy being too much  He was not given home exercise program yet.  Response to previous treatment: on initial visit  Functional change: none    Pain: 7-8/10  Location: rectum and left buttocks     OBJECTIVE   .  Tight hamstrings.      Treatment     Jarred received the treatments listed below:      therapeutic exercises to develop flexibility and core stabilization for 30 minutes including:    Seated exercises:  Gentle hamstring stretches/ Gastroc stretch 3 x 20 seconds bilateral with strap and lower extremity on stool      Supine exercises:  Posterior pelvic tilt x 15 reps   lower trunk rotation x 15 reps   bridging x 10 reps   Ball squeezes x 15 reps     Jarred received the following supervised modalities after being cleared for contradictions: IFC Electrical Stimulation:  Jarred received IFC Electrical Stimulation for pain control applied to the low back with Moist Hot Pack. Pt received  "stimulation at a frequency of 16 Volts for 15 minutes. Jarred tolderated treatment well without any adverse effects.      Patient Education and Home Exercises     Home Exercises Provided and Patient Education Provided     Education provided:   - Home Exercises Instructions  - Educated pt on applying ice if delayed muscle soreness occurs and after exercises at home.  - Not to put wallet in back pocket when sitting.    Written Home Exercises Provided: yes. Additional exercises given to pt and daughter.  Exercises were reviewed and Jarred was able to demonstrate them prior to the end of the session.  Jarred demonstrated fair  understanding of the education provided. See EMR under Patient Instructions for exercises provided during therapy sessions    ASSESSMENT   Jarred provided good effort and participation toward therapeutic interventions today with focus on low back stretching, range of motion and decreasing pain.    Interferential electrical stimulation/ Moist Hot Pack "felt really nice" per patient at end of session.  Pt needs continuous cuing for exercise technique and counting number of reps due to cognitive deficits.    Jarred Is progressing well towards his goals.   Pt prognosis is Good.     Pt will continue to benefit from skilled outpatient physical therapy to address the deficits listed in the problem list box on initial evaluation, provide pt/family education and to maximize pt's level of independence in the home and community environment.     Pt's spiritual, cultural and educational needs considered and pt agreeable to plan of care and goals.     Anticipated barriers to physical therapy: carryover of skills learned, cognitive deficits    Goals:     Short Term Goals: (4 weeks) Status:    1. Patient will be provided with an individualized home exercise program.  met   2.  Patient will demonstrate improve endurance and activity tolerance as evidenced by ability to perform Nu-step x 10 minutes without " rests breaks with heart rate post-activity equivalent to 50-80% of maximum heart rate (or comparable rate of percieved exertion).  ongoing   3.  Patient will decrease complaint of worst low back pain from 8/10 to 7/10 to decrease interference from pain with functional activities. (MDIC for chronic musculoskeletal pain = 1 point) ongoing               Long Term Goals: (4 weeks) Status:    1. Patient will be independent and compliant with updated home exercise program. ongoing   2. Patient will demonstrate improve endurance and activity tolerance as evidenced by ability to perform Nu-step x 15minutes without rests breaks with heart rate post-activity equivalent to 50-80% of maximum heart rate (or comparable rate of percieved exertion).    ongoing   3. Patient will increased lower extremity strength as evidenced by increase in manual muscle test  by 1/2 grade as appropriate to decrease gait deviations consistently.  ongoing      4. The patient will be provided with any equipment and/or orthotic evaluations and recommendations as indicated on an ongoing basis prior to discharge from therapy.  ongoing   5. Patient will improve his FOTO score from 34%  to at least 52% for improved self perception of functional mobility.(score 0-100, high score indicates greater level of function)    ongoing   6. Patient will decrease complaint of worst low back pain from 8/10 to 7/10 to decrease interference from pain with functional activities. (MDIC for chronic musculoskeletal pain = 1 point)    ongoing             PLAN     Plan of care Certification: 8/2/2022 to 9/30/2022.     Outpatient Physical Therapy 2 times weekly for 8 weeks to include the following interventions: Cervical/Lumbar Traction, Electrical Stimulation (as indicated and cleared for contraindication) , Gait Training, Manual Therapy, Moist Heat/ Ice, Neuromuscular Re-ed, Orthotic Management and Training, Patient Education, Therapeutic Activities, Therapeutic Exercise and  Ultrasound    Continue Plan of care as tolerated    Rosa Mendoza, PTA

## 2022-08-18 NOTE — PROGRESS NOTES
Subjective:    Patient ID:  Jarred Harrison is a 82 y.o. male who presents for   Hypotension (80s/50s after midodrine), Coronary Artery Disease, Dizziness, and Fatigue    HPI1   preogressive dementia  Worse - w up inc mri  - mild cogniotive impairment   According to    Neuro   2) bp  Drops to  80  In am and pm     3)     Has pat  But with  Hr and lo bp  No antiaarthymics noww     bp and pul;se checks   Family is very concerned about his change in mental status he is not eating  Patient deteriorated since he is taking care of his wife who is in hospice  Event monitor for 4-5 days demonstrated runs of supraventricular tachycardia but he has underlying  Bundle-branch block with some bradycardia      Review of patient's allergies indicates:   Allergen Reactions    Hydrocodone        Past Medical History:   Diagnosis Date    AI (aortic insufficiency)     Coronary artery disease     GERD (gastroesophageal reflux disease)     Hyperlipidemia     Hypertension     LBBB (left bundle branch block)     SSS (sick sinus syndrome)      Past Surgical History:   Procedure Laterality Date    CARDIAC CATHETERIZATION      CATARACT EXTRACTION      CORONARY ANGIOPLASTY  08/29/82015    CORONARY STENT PLACEMENT  2015    Dr Parr    EPIDURAL STEROID INJECTION N/A 7/29/2022    Procedure: Injection, Steroid, Epidural;  Surgeon: Janes Woodson MD;  Location: [facility] OR;  Service: Pain Management;  Laterality: N/A;  L5-s1     FOOT SURGERY Right     KNEE SURGERY Left     right elbow       Social History     Tobacco Use    Smoking status: Never Smoker    Smokeless tobacco: Never Used   Substance Use Topics    Alcohol use: Not Currently        Review of Systems     Review of Systems   Constitutional: Positive for weight loss.   HENT: Negative.    Eyes: Negative.    Cardiovascular: Negative.    Respiratory: Negative.    Skin: Positive for color change.   Musculoskeletal: Positive for arthritis.   Neurological: Positive for  dizziness, focal weakness, light-headedness, loss of balance and weakness.           Objective:        Vitals:    08/18/22 1313   BP: 122/70   Pulse: 79       Lab Results   Component Value Date    WBC 6.1 05/03/2022    HGB 12.3 (L) 05/03/2022    HCT 37.3 (L) 05/03/2022     05/03/2022    CHOL 118 05/03/2022    TRIG 56 05/03/2022    HDL 44 05/03/2022    ALT 16 05/03/2022    AST 26 05/03/2022     05/03/2022    K 4.7 05/03/2022     05/03/2022    CREATININE 1.11 05/03/2022    BUN 20 05/03/2022    CO2 29 05/03/2022    TSH 3.25 12/20/2021        ECHOCARDIOGRAM RESULTS  Results for orders placed during the hospital encounter of 07/22/21    Echo    Interpretation Summary  · The left ventricle is normal in size with normal systolic function.  · The estimated ejection fraction is 55%.  · Normal left ventricular diastolic function.  · Normal right ventricular size with normal right ventricular systolic function.  · Mild mitral regurgitation.  · Mild tricuspid regurgitation.  · Normal central venous pressure (3 mmHg).  · The estimated PA systolic pressure is 19 mmHg.      CURRENT/PREVIOUS VISIT EKG  Results for orders placed or performed in visit on 07/18/22   IN OFFICE EKG 12-LEAD (to [place])    Collection Time: 07/18/22  3:18 PM    Narrative    Test Reason : I25.10,    Vent. Rate : 057 BPM     Atrial Rate : 057 BPM     P-R Int : 170 ms          QRS Dur : 156 ms      QT Int : 462 ms       P-R-T Axes : 080 037 102 degrees     QTc Int : 449 ms    Sinus bradycardia  Left bundle branch block  Abnormal ECG  When compared with ECG of 14-JUL-2021 16:07,  Fusion complexes are no longer Present  Premature ventricular complexes are no longer Present  Sinus rhythm is no longer with junctional escape complexes    Referred By:  RADHA           Confirmed By:      Results for orders placed during the hospital encounter of 07/22/21    Echo    Interpretation Summary  · The left ventricle is normal in size with normal systolic  function.  · The estimated ejection fraction is 55%.  · Normal left ventricular diastolic function.  · Normal right ventricular size with normal right ventricular systolic function.  · Mild mitral regurgitation.  · Mild tricuspid regurgitation.  · Normal central venous pressure (3 mmHg).  · The estimated PA systolic pressure is 19 mmHg.    Results for orders placed during the hospital encounter of 07/22/21    Nuclear Stress - Cardiology Interpreted    Interpretation Summary    There is a mild to moderate intensity, small to moderate sized, equivocal defect that is fixed in the inferoseptal wall(s). This finding is equivocal due to diaphragm shadow.    The gated perfusion images showed an ejection fraction of 64% post stress. Normal ejection fraction is greater than 53%.    The EKG portion of this study is uninterpretable.    The patient reported chest pain during the stress test.    There were no arrhythmias during stress.    There is no evidence of ischemia      PHYSICAL EXAM    Physical Exam blood pressure is 100/80 and standing and false approximately 80 systolic  Head neck no bruit  Lungs are clear  Cardiac regular  Palpation abdomen no masses no hepatosplenomegaly  Extremities is thin    Medication List with Changes/Refills   Current Medications    ASPIRIN (ECOTRIN) 81 MG EC TABLET    Take 81 mg by mouth once daily.    CETIRIZINE (ZYRTEC) 10 MG TABLET    TK 1 T PO D PRF ALLERGIES/SINUS COG    ESCITALOPRAM OXALATE (LEXAPRO) 20 MG TABLET    Take 1 tablet (20 mg total) by mouth once daily.    EZETIMIBE (ZETIA) 10 MG TABLET    Take 1 tablet (10 mg total) by mouth once daily.    HYOSCYAMINE (ANASPAZ,LEVSIN) 0.125 MG TAB    Take 125 mcg by mouth 2 (two) times a day.    LATANOPROST 0.005 % OPHTHALMIC SOLUTION        MIDODRINE (PROAMATINE) 2.5 MG TAB    Take 1  tab breakfast  And  supper    MULTIVITAMIN CAPSULE    Take 1 capsule by mouth once daily.    NITROGLYCERIN (NITROSTAT) 0.4 MG SL TABLET    Place 1 tablet  (0.4 mg total) under the tongue every 5 (five) minutes as needed for Chest pain.    PANTOPRAZOLE (PROTONIX) 40 MG TABLET    Take 1 tablet (40 mg total) by mouth once daily.    TRAMADOL (ULTRAM) 50 MG TABLET    TAKE 1 TABLET(50 MG) BY MOUTH EVERY 6 HOURS AS NEEDED FOR PAIN   Discontinued Medications    ASCORBIC ACID, VITAMIN C, (VITAMIN C) 500 MG TABLET    Take 500 mg by mouth once daily.    COENZYME Q10 100 MG CAPSULE    Take 100 mg by mouth once daily.    GABAPENTIN (NEURONTIN) 100 MG CAPSULE    Take 1 capsule (100 mg total) by mouth every evening.    GLUC MUÑOZ/CHONDRO MÑUOZ A/VIT C/MN (GLUCOSAMINE 1500 COMPLEX ORAL)    Glucosamine    AND CHONDROITIN 1500, 1200 MG MULTIVITAMIN    GLUCOSAMINE-CHONDROITIN 500-400 MG TABLET    Take 1 tablet by mouth 3 (three) times daily.    HYDROCORTISONE-PRAMOXINE (PROCTOFOAM-HS) RECTAL FOAM    Place 1 applicator rectally 2 (two) times daily.    TRAZODONE (DESYREL) 50 MG TABLET    1 tablet QHS prn insomnia    VITAMIN D (VITAMIN D3) 1000 UNITS TAB    Take 1,000 Units by mouth once daily.           Assessment:       1. Orthostatic hypotension    2. PSVT (paroxysmal supraventricular tachycardia)    3. Late onset Alzheimer's dementia without behavioral disturbance    4. Hypothermia, subsequent encounter    Patient is on 10 mg of midodrine 3 times a day  He does have possible supraventricular tachycardia; number hold off giving him beta or calcium channel blockers because of his hypotension  I personally reviewed old and new ecg's, lab reports,, xray reports  and  other cardiovascular studies including  echo's, stress tests, angiogram reports, holters,and vascular studies .  In addition I evaluated original cardiac cath  ___echo  ____cxr ______ct ____scan on [employer] or [employer] or other viewing platforms .  I reviewed  office and hospital notes Yes __x __ of  referring providers.       Plan:   Alejandro,  Ck cortsiol levels     Eat moee na   Ck  b12  Folate c react + sed    Get santo  records -house is groove did MRIs of the head we do not have those results are going to try to obtain that  Problem List Items Addressed This Visit        Cardiac/Vascular    Orthostatic hypotension - Primary      Other Visit Diagnoses     PSVT (paroxysmal supraventricular tachycardia)        Relevant Orders    C-REACTIVE PROTEIN    Late onset Alzheimer's dementia without behavioral disturbance        Relevant Orders    Sedimentation rate    C-REACTIVE PROTEIN    Hypothermia, subsequent encounter               No follow-ups on file.

## 2022-08-21 ENCOUNTER — PATIENT MESSAGE (OUTPATIENT)
Dept: SPINE | Facility: CLINIC | Age: 82
End: 2022-08-21
Payer: MEDICARE

## 2022-08-22 ENCOUNTER — OFFICE VISIT (OUTPATIENT)
Dept: FAMILY MEDICINE | Facility: CLINIC | Age: 82
End: 2022-08-22
Payer: MEDICARE

## 2022-08-22 ENCOUNTER — PATIENT MESSAGE (OUTPATIENT)
Dept: CARDIOLOGY | Facility: CLINIC | Age: 82
End: 2022-08-22
Payer: MEDICARE

## 2022-08-22 VITALS
WEIGHT: 128 LBS | BODY MASS INDEX: 20.09 KG/M2 | SYSTOLIC BLOOD PRESSURE: 108 MMHG | HEIGHT: 67 IN | HEART RATE: 60 BPM | DIASTOLIC BLOOD PRESSURE: 60 MMHG

## 2022-08-22 DIAGNOSIS — I10 ESSENTIAL HYPERTENSION: ICD-10-CM

## 2022-08-22 DIAGNOSIS — H90.0 CONDUCTIVE HEARING LOSS, BILATERAL: ICD-10-CM

## 2022-08-22 DIAGNOSIS — E78.2 MIXED HYPERLIPIDEMIA: ICD-10-CM

## 2022-08-22 DIAGNOSIS — I47.10 SVT (SUPRAVENTRICULAR TACHYCARDIA): ICD-10-CM

## 2022-08-22 DIAGNOSIS — F03.91 DEMENTIA WITH BEHAVIORAL DISTURBANCE, UNSPECIFIED DEMENTIA TYPE: Primary | ICD-10-CM

## 2022-08-22 PROBLEM — F02.818 LATE ONSET ALZHEIMER'S DEMENTIA WITH BEHAVIORAL DISTURBANCE: Status: ACTIVE | Noted: 2021-12-27

## 2022-08-22 PROBLEM — G30.1 LATE ONSET ALZHEIMER'S DEMENTIA WITH BEHAVIORAL DISTURBANCE: Status: ACTIVE | Noted: 2021-12-27

## 2022-08-22 PROCEDURE — 3078F PR MOST RECENT DIASTOLIC BLOOD PRESSURE < 80 MM HG: ICD-10-PCS | Mod: CPTII,S$GLB,, | Performed by: FAMILY MEDICINE

## 2022-08-22 PROCEDURE — 99214 PR OFFICE/OUTPT VISIT, EST, LEVL IV, 30-39 MIN: ICD-10-PCS | Mod: S$GLB,,, | Performed by: FAMILY MEDICINE

## 2022-08-22 PROCEDURE — 3074F SYST BP LT 130 MM HG: CPT | Mod: CPTII,S$GLB,, | Performed by: FAMILY MEDICINE

## 2022-08-22 PROCEDURE — 3074F PR MOST RECENT SYSTOLIC BLOOD PRESSURE < 130 MM HG: ICD-10-PCS | Mod: CPTII,S$GLB,, | Performed by: FAMILY MEDICINE

## 2022-08-22 PROCEDURE — 3078F DIAST BP <80 MM HG: CPT | Mod: CPTII,S$GLB,, | Performed by: FAMILY MEDICINE

## 2022-08-22 PROCEDURE — 1159F PR MEDICATION LIST DOCUMENTED IN MEDICAL RECORD: ICD-10-PCS | Mod: CPTII,S$GLB,, | Performed by: FAMILY MEDICINE

## 2022-08-22 PROCEDURE — 99214 OFFICE O/P EST MOD 30 MIN: CPT | Mod: S$GLB,,, | Performed by: FAMILY MEDICINE

## 2022-08-22 PROCEDURE — 1159F MED LIST DOCD IN RCRD: CPT | Mod: CPTII,S$GLB,, | Performed by: FAMILY MEDICINE

## 2022-08-22 RX ORDER — QUETIAPINE FUMARATE 25 MG/1
25 TABLET, FILM COATED ORAL
Qty: 30 TABLET | Refills: 3 | Status: ON HOLD | OUTPATIENT
Start: 2022-08-22 | End: 2022-12-15 | Stop reason: HOSPADM

## 2022-08-23 ENCOUNTER — LAB VISIT (OUTPATIENT)
Dept: LAB | Facility: HOSPITAL | Age: 82
End: 2022-08-23
Attending: FAMILY MEDICINE
Payer: MEDICARE

## 2022-08-23 ENCOUNTER — CLINICAL SUPPORT (OUTPATIENT)
Dept: REHABILITATION | Facility: HOSPITAL | Age: 82
End: 2022-08-23
Payer: MEDICARE

## 2022-08-23 DIAGNOSIS — R26.89 DECREASED FUNCTIONAL MOBILITY: ICD-10-CM

## 2022-08-23 DIAGNOSIS — M25.60 DECREASED RANGE OF MOTION: Primary | ICD-10-CM

## 2022-08-23 DIAGNOSIS — F03.91 DEMENTIA WITH BEHAVIORAL DISTURBANCE, UNSPECIFIED DEMENTIA TYPE: ICD-10-CM

## 2022-08-23 DIAGNOSIS — R53.1 DECREASED STRENGTH: ICD-10-CM

## 2022-08-23 DIAGNOSIS — M62.89 MUSCLE TIGHTNESS: ICD-10-CM

## 2022-08-23 DIAGNOSIS — I10 ESSENTIAL HYPERTENSION: ICD-10-CM

## 2022-08-23 LAB
ALBUMIN SERPL BCP-MCNC: 4.5 G/DL (ref 3.5–5.2)
ALP SERPL-CCNC: 39 U/L (ref 55–135)
ALT SERPL W/O P-5'-P-CCNC: 17 U/L (ref 10–44)
ANION GAP SERPL CALC-SCNC: 5 MMOL/L (ref 8–16)
AST SERPL-CCNC: 24 U/L (ref 10–40)
BASOPHILS # BLD AUTO: 0.07 K/UL (ref 0–0.2)
BASOPHILS NFR BLD: 0.9 % (ref 0–1.9)
BILIRUB SERPL-MCNC: 1.2 MG/DL (ref 0.1–1)
BUN SERPL-MCNC: 20 MG/DL (ref 8–23)
CALCIUM SERPL-MCNC: 10.3 MG/DL (ref 8.7–10.5)
CHLORIDE SERPL-SCNC: 105 MMOL/L (ref 95–110)
CHOLEST SERPL-MCNC: 139 MG/DL (ref 120–199)
CHOLEST/HDLC SERPL: 3 {RATIO} (ref 2–5)
CO2 SERPL-SCNC: 28 MMOL/L (ref 23–29)
CREAT SERPL-MCNC: 1.1 MG/DL (ref 0.5–1.4)
DIFFERENTIAL METHOD: ABNORMAL
EOSINOPHIL # BLD AUTO: 0.3 K/UL (ref 0–0.5)
EOSINOPHIL NFR BLD: 3.4 % (ref 0–8)
ERYTHROCYTE [DISTWIDTH] IN BLOOD BY AUTOMATED COUNT: 12.7 % (ref 11.5–14.5)
EST. GFR  (NO RACE VARIABLE): >60 ML/MIN/1.73 M^2
GLUCOSE SERPL-MCNC: 104 MG/DL (ref 70–110)
HCT VFR BLD AUTO: 36.9 % (ref 40–54)
HDLC SERPL-MCNC: 46 MG/DL (ref 40–75)
HDLC SERPL: 33.1 % (ref 20–50)
HGB BLD-MCNC: 12.2 G/DL (ref 14–18)
IMM GRANULOCYTES # BLD AUTO: 0.03 K/UL (ref 0–0.04)
IMM GRANULOCYTES NFR BLD AUTO: 0.4 % (ref 0–0.5)
LDLC SERPL CALC-MCNC: 81 MG/DL (ref 63–159)
LYMPHOCYTES # BLD AUTO: 1.5 K/UL (ref 1–4.8)
LYMPHOCYTES NFR BLD: 19.1 % (ref 18–48)
MCH RBC QN AUTO: 31.9 PG (ref 27–31)
MCHC RBC AUTO-ENTMCNC: 33.1 G/DL (ref 32–36)
MCV RBC AUTO: 97 FL (ref 82–98)
MONOCYTES # BLD AUTO: 0.7 K/UL (ref 0.3–1)
MONOCYTES NFR BLD: 9.2 % (ref 4–15)
NEUTROPHILS # BLD AUTO: 5.3 K/UL (ref 1.8–7.7)
NEUTROPHILS NFR BLD: 67 % (ref 38–73)
NONHDLC SERPL-MCNC: 93 MG/DL
NRBC BLD-RTO: 0 /100 WBC
PLATELET # BLD AUTO: 212 K/UL (ref 150–450)
PMV BLD AUTO: 9.7 FL (ref 9.2–12.9)
POTASSIUM SERPL-SCNC: 4.3 MMOL/L (ref 3.5–5.1)
PROT SERPL-MCNC: 7.4 G/DL (ref 6–8.4)
RBC # BLD AUTO: 3.82 M/UL (ref 4.6–6.2)
SODIUM SERPL-SCNC: 138 MMOL/L (ref 136–145)
TRIGL SERPL-MCNC: 60 MG/DL (ref 30–150)
TSH SERPL DL<=0.005 MIU/L-ACNC: 2.34 UIU/ML (ref 0.34–5.6)
WBC # BLD AUTO: 7.95 K/UL (ref 3.9–12.7)

## 2022-08-23 PROCEDURE — 80061 LIPID PANEL: CPT | Performed by: FAMILY MEDICINE

## 2022-08-23 PROCEDURE — 97140 MANUAL THERAPY 1/> REGIONS: CPT | Mod: PN

## 2022-08-23 PROCEDURE — 85025 COMPLETE CBC W/AUTO DIFF WBC: CPT | Performed by: FAMILY MEDICINE

## 2022-08-23 PROCEDURE — 80053 COMPREHEN METABOLIC PANEL: CPT | Performed by: FAMILY MEDICINE

## 2022-08-23 PROCEDURE — 84443 ASSAY THYROID STIM HORMONE: CPT | Performed by: FAMILY MEDICINE

## 2022-08-23 PROCEDURE — 97110 THERAPEUTIC EXERCISES: CPT | Mod: PN

## 2022-08-23 PROCEDURE — 36415 COLL VENOUS BLD VENIPUNCTURE: CPT | Performed by: FAMILY MEDICINE

## 2022-08-23 NOTE — PROGRESS NOTES
SUBJECTIVE:    Patient ID: Jarred Harrison is a 82 y.o. male.    Chief Complaint: Follow-up (No bottles // weakness //blood pressure problems// heal pain // loss memory and confusion //abc )    82-year-old male has had a definite decline in his mental status.  Family is now worried as he is becoming very confused does not recognize his own home.  His speech appears intact but he was found with many layers of clothing on including his wife's night gown.  He cannot use a telephone anymore.  This rapid the mental decline has the family concerned and would like medical reasons ruled out.  He has had an MRI of the brain done in the last year with [facility] .we will try to get a copy of this result    Hypotension.  Some cardiology DrBoubacar GARCIA.  Agreed with Midodrine use but also added Florinef 0.1 mg daily.  And event monitor revealed SVT episodes.    He has a decreased appetite lately.  GI exam found rectal fissure; he was placed on stool softeners t.i.d. along with a rectal cream.    Patient has significant bilateral ankle pains and foot pains.  He states that prevents him from walking much anymore.  He has been taking Tylenol for the pain because he could not take tramadol or tolerate gabapentin.      Office Visit on 05/02/2022   Component Date Value Ref Range Status    WBC 05/03/2022 6.1  3.8 - 10.8 Thousand/uL Final    RBC 05/03/2022 3.78 (A) 4.20 - 5.80 Million/uL Final    Hemoglobin 05/03/2022 12.3 (A) 13.2 - 17.1 g/dL Final    Hematocrit 05/03/2022 37.3 (A) 38.5 - 50.0 % Final    MCV 05/03/2022 98.7  80.0 - 100.0 fL Final    MCH 05/03/2022 32.5  27.0 - 33.0 pg Final    MCHC 05/03/2022 33.0  32.0 - 36.0 g/dL Final    RDW 05/03/2022 11.9  11.0 - 15.0 % Final    Platelets 05/03/2022 170  140 - 400 Thousand/uL Final    MPV 05/03/2022 10.3  7.5 - 12.5 fL Final    Neutrophils, Abs 05/03/2022 4,008  1,500 - 7,800 cells/uL Final    Lymph # 05/03/2022 1,196  850 - 3,900 cells/uL Final    Mono #  05/03/2022 531  200 - 950 cells/uL Final    Eos # 05/03/2022 305  15 - 500 cells/uL Final    Baso # 05/03/2022 61  0 - 200 cells/uL Final    Neutrophils Relative 05/03/2022 65.7  % Final    Lymph % 05/03/2022 19.6  % Final    Mono % 05/03/2022 8.7  % Final    Eosinophil % 05/03/2022 5.0  % Final    Basophil % 05/03/2022 1.0  % Final    Glucose 05/03/2022 91  65 - 99 mg/dL Final    BUN 05/03/2022 20  7 - 25 mg/dL Final    Creatinine 05/03/2022 1.11  0.70 - 1.11 mg/dL Final    eGFR if non  05/03/2022 61  > OR = 60 mL/min/1.73m2 Final    eGFR if  05/03/2022 70  > OR = 60 mL/min/1.73m2 Final    BUN/Creatinine Ratio 05/03/2022 NOT APPLICABLE  6 - 22 (calc) Final    Sodium 05/03/2022 139  135 - 146 mmol/L Final    Potassium 05/03/2022 4.7  3.5 - 5.3 mmol/L Final    Chloride 05/03/2022 104  98 - 110 mmol/L Final    CO2 05/03/2022 29  20 - 32 mmol/L Final    Calcium 05/03/2022 10.1  8.6 - 10.3 mg/dL Final    Total Protein 05/03/2022 6.7  6.1 - 8.1 g/dL Final    Albumin 05/03/2022 4.2  3.6 - 5.1 g/dL Final    Globulin, Total 05/03/2022 2.5  1.9 - 3.7 g/dL (calc) Final    Albumin/Globulin Ratio 05/03/2022 1.7  1.0 - 2.5 (calc) Final    Total Bilirubin 05/03/2022 0.7  0.2 - 1.2 mg/dL Final    Alkaline Phosphatase 05/03/2022 49  35 - 144 U/L Final    AST 05/03/2022 26  10 - 35 U/L Final    ALT 05/03/2022 16  9 - 46 U/L Final    Cholesterol 05/03/2022 118  <200 mg/dL Final    HDL 05/03/2022 44  > OR = 40 mg/dL Final    Triglycerides 05/03/2022 56  <150 mg/dL Final    LDL Cholesterol 05/03/2022 60  mg/dL (calc) Final    HDL/Cholesterol Ratio 05/03/2022 2.7  <5.0 (calc) Final    Non HDL Chol. (LDL+VLDL) 05/03/2022 74  <130 mg/dL (calc) Final    TSH w/reflex to FT4 05/03/2022 3.96  0.40 - 4.50 mIU/L Final    Color, UA 05/03/2022 YELLOW  YELLOW Final    Appearance, UA 05/03/2022 CLEAR  CLEAR Final    Specific Gravity, UA 05/03/2022 1.014  1.001 - 1.035 Final    pH,  UA 05/03/2022 7.0  5.0 - 8.0 Final    Glucose, UA 05/03/2022 NEGATIVE  NEGATIVE Final    Bilirubin, UA 05/03/2022 NEGATIVE  NEGATIVE Final    Ketones, UA 05/03/2022 NEGATIVE  NEGATIVE Final    Occult Blood UA 05/03/2022 NEGATIVE  NEGATIVE Final    Protein, UA 05/03/2022 NEGATIVE  NEGATIVE Final    Nitrite, UA 05/03/2022 NEGATIVE  NEGATIVE Final    Leukocytes, UA 05/03/2022 NEGATIVE  NEGATIVE Final    WBC Casts, UA 05/03/2022 NONE SEEN  < OR = 5 /HPF Final    RBC Casts, UA 05/03/2022 0-2  < OR = 2 /HPF Final    Squam Epithel, UA 05/03/2022 NONE SEEN  < OR = 5 /HPF Final    Bacteria, UA 05/03/2022 NONE SEEN  NONE SEEN /HPF Final    Hyaline Casts, UA 05/03/2022 NONE SEEN  NONE SEEN /LPF Final    Reflexive Urine Culture 05/03/2022    Final       Past Medical History:   Diagnosis Date    AI (aortic insufficiency)     Coronary artery disease     GERD (gastroesophageal reflux disease)     Hyperlipidemia     Hypertension     LBBB (left bundle branch block)     SSS (sick sinus syndrome)      Social History     Socioeconomic History    Marital status:    Tobacco Use    Smoking status: Never Smoker    Smokeless tobacco: Never Used   Substance and Sexual Activity    Alcohol use: Not Currently     Past Surgical History:   Procedure Laterality Date    CARDIAC CATHETERIZATION      CATARACT EXTRACTION      CORONARY ANGIOPLASTY  08/29/82015    CORONARY STENT PLACEMENT  2015    Dr Parr    EPIDURAL STEROID INJECTION N/A 7/29/2022    Procedure: Injection, Steroid, Epidural;  Surgeon: Janes Woodson MD;  Location: [facility] OR;  Service: Pain Management;  Laterality: N/A;  L5-s1     FOOT SURGERY Right     KNEE SURGERY Left     right elbow       No family history on file.    Review of patient's allergies indicates:   Allergen Reactions    Hydrocodone        Current Outpatient Medications:     aspirin (ECOTRIN) 81 MG EC tablet, Take 81 mg by mouth once daily., Disp: , Rfl:     EScitalopram oxalate  (LEXAPRO) 20 MG tablet, Take 1 tablet (20 mg total) by mouth once daily. (Patient taking differently: Take 10 mg by mouth once daily.), Disp: 90 tablet, Rfl: 3    ezetimibe (ZETIA) 10 mg tablet, Take 1 tablet (10 mg total) by mouth once daily., Disp: 90 tablet, Rfl: 3    fludrocortisone (FLORINEF) 0.1 mg Tab, Take 1 tablet (100 mcg total) by mouth once daily., Disp: 30 tablet, Rfl: 5    hyoscyamine (ANASPAZ,LEVSIN) 0.125 mg Tab, Take 125 mcg by mouth 2 (two) times a day., Disp: , Rfl:     latanoprost 0.005 % ophthalmic solution, , Disp: , Rfl:     midodrine (PROAMATINE) 2.5 MG Tab, Take 1  tab breakfast  And  supper (Patient taking differently: 2.5 mg 3 (three) times daily with meals.), Disp: 180 tablet, Rfl: 1    multivitamin capsule, Take 1 capsule by mouth once daily., Disp: , Rfl:     pantoprazole (PROTONIX) 40 MG tablet, Take 1 tablet (40 mg total) by mouth once daily., Disp: 90 tablet, Rfl: 3    traMADoL (ULTRAM) 50 mg tablet, TAKE 1 TABLET(50 MG) BY MOUTH EVERY 6 HOURS AS NEEDED FOR PAIN, Disp: 28 tablet, Rfl: 0    nitroGLYCERIN (NITROSTAT) 0.4 MG SL tablet, Place 1 tablet (0.4 mg total) under the tongue every 5 (five) minutes as needed for Chest pain., Disp: 30 tablet, Rfl: 1    QUEtiapine (SEROQUEL) 25 MG Tab, Take 1 tablet (25 mg total) by mouth before dinner., Disp: 30 tablet, Rfl: 3    Review of Systems   Constitutional: Negative for appetite change, chills, fatigue, fever and unexpected weight change.   HENT: Negative for congestion, ear pain and trouble swallowing.    Eyes: Negative for pain, discharge and visual disturbance.   Respiratory: Negative for apnea, cough, shortness of breath and wheezing.    Cardiovascular: Negative for chest pain and leg swelling.   Gastrointestinal: Negative for abdominal pain, blood in stool, constipation, diarrhea, nausea and vomiting.   Endocrine: Negative for cold intolerance, heat intolerance and polydipsia.   Genitourinary: Negative for dysuria, hematuria,  "testicular pain and urgency.   Musculoskeletal: Positive for arthralgias (Bilateral ankle and foot pain.). Negative for gait problem, joint swelling and myalgias.   Neurological: Negative for dizziness, seizures and numbness.   Psychiatric/Behavioral: Positive for agitation, behavioral problems and confusion. Negative for hallucinations. The patient is not nervous/anxious.           Objective:      Vitals:    08/22/22 1346   BP: 108/60   Pulse: 60   Weight: 58.1 kg (128 lb)   Height: 5' 7" (1.702 m)     Physical Exam  Vitals and nursing note reviewed.   Constitutional:       General: He is not in acute distress.     Appearance: Normal appearance. He is well-developed and normal weight. He is not toxic-appearing.   HENT:      Head: Normocephalic and atraumatic.      Right Ear: Tympanic membrane and external ear normal.      Left Ear: Tympanic membrane and external ear normal.      Ears:      Comments: Hearing aids are present     Nose: Nose normal.   Eyes:      Pupils: Pupils are equal, round, and reactive to light.   Neck:      Thyroid: No thyromegaly.      Vascular: No carotid bruit.   Cardiovascular:      Rate and Rhythm: Normal rate and regular rhythm.      Heart sounds: Normal heart sounds. No murmur heard.  Pulmonary:      Effort: Pulmonary effort is normal.      Breath sounds: Normal breath sounds. No wheezing or rales.   Abdominal:      General: Bowel sounds are normal. There is no distension.      Palpations: Abdomen is soft.      Tenderness: There is no abdominal tenderness.   Musculoskeletal:         General: No tenderness or deformity. Normal range of motion.      Cervical back: Normal range of motion and neck supple.      Lumbar back: Normal. No spasms.      Comments: Bends 90 degrees at  waist ankles have no gross deformity or swelling.  He does walk in the room without a limp.  No pitting edema to lower extremities   Lymphadenopathy:      Cervical: No cervical adenopathy.   Skin:     General: Skin is " warm and dry.      Findings: No rash.   Neurological:      Mental Status: He is alert and oriented to person, place, and time.      Cranial Nerves: No cranial nerve deficit.      Coordination: Coordination normal.      Comments: Patient has very compromised short-term memory and is quite confused   Psychiatric:         Behavior: Behavior normal.         Thought Content: Thought content normal.         Judgment: Judgment normal.           Assessment:       1. Dementia with behavioral disturbance, unspecified dementia type    2. Essential hypertension    3. Conductive hearing loss, bilateral    4. Mixed hyperlipidemia    5. SVT (supraventricular tachycardia)         Plan:       Dementia with behavioral disturbance, unspecified dementia type  -     CBC Auto Differential; Future; Expected date: 08/22/2022  -     Comprehensive Metabolic Panel; Future; Expected date: 08/22/2022  -     Lipid Panel; Future; Expected date: 08/22/2022  -     Urinalysis, Reflex to Urine Culture Urine, Clean Catch; Future; Expected date: 08/22/2022  -     TSH; Future; Expected date: 08/22/2022  -     QUEtiapine (SEROQUEL) 25 MG Tab; Take 1 tablet (25 mg total) by mouth before dinner.  Dispense: 30 tablet; Refill: 3  Differential includes dementia versus delirium.  He is sundowning in the afternoons and evenings according to the family.  Will add Seroquel 25 mg nightly and do a basic lab workup to rule out infection.  Mini-mental status exam today is 21/30  Essential hypertension  -     CBC Auto Differential; Future; Expected date: 08/22/2022  -     Comprehensive Metabolic Panel; Future; Expected date: 08/22/2022  -     Lipid Panel; Future; Expected date: 08/22/2022  -     Urinalysis, Reflex to Urine Culture Urine, Clean Catch; Future; Expected date: 08/22/2022  -     TSH; Future; Expected date: 08/22/2022  Blood pressure adequately controlled 108/60, no longer having significant hypotension  Conductive hearing loss, bilateral  Continue hearing  aids  Mixed hyperlipidemia  Labs ordered to revisit this  SVT (supraventricular tachycardia)  Sinus rhythm today    No follow-ups on file.        8/22/2022 Jeyson Waldron

## 2022-08-23 NOTE — PROGRESS NOTES
[facility]   Physical Therapy Treatment Note     Name: Jarred [facility]  Clinic Number: [id number]    Therapy Diagnosis:   Encounter Diagnoses   Name Primary?    Decreased range of motion Yes    Muscle tightness     Decreased functional mobility     Decreased strength      Physician: Jeyson Mccann MD    Visit Date: 8/23/2022     Physician Orders: PT Eval and Treat   Medical Diagnosis from Referral: M54.42 (ICD-10-CM) - Acute left-sided low back pain with left-sided sciatica  Evaluation Date: 8/2/2022  Authorization Period Expiration: 08/01/2023  Plan of Care Expiration: 9/30/2022  Progress Note Due: 9/2/2022  Visit # / Visits authorized: 6/20      Precautions: Blood pressure instability, hard of hearing, risk for falls, cognitive impairment, impaired vision     Time In: 1245  Time Out: 1330  Total Appointment Time (timed & untimed codes): 45 minutes     SUBJECTIVE     Pt reports:   He was not given home exercise program yet.  Response to previous treatment: on initial visit  Functional change: none    Pain: 6/10  Location:left posterior thigh     OBJECTIVE   .  Tight hamstrings.      Treatment     Jarred received the treatments listed below:      therapeutic exercises to develop flexibility and core stabilization for 30 minutes including:  Nustep L1 10' all 4's  Gentle hamstring stretches  Posterior pelvic tilt 20  Single knee to chest 10/10  Double knee to chest 10    Manual therapy 15 includes  neural glides. 20  Manual traction/decompression      Patient Education and Home Exercises     Home Exercises Provided and Patient Education Provided     Education provided:   -log rolling on supine to sit.    Written Home Exercises Provided: yes. Exercises were reviewed and Jarred was able to demonstrate them prior to the end of the session.  Jarred demonstrated fair  understanding of the education provided. See EMR under Patient Instructions for exercises provided during therapy  sessions    ASSESSMENT     Patient's daughter states he's doing better from last time.  Patient admits he can't remember well. No aggravated pain during session except supine to sit.  Tolerated Rx..    Jarred Is progressing well towards his goals.   Pt prognosis is Good.     Pt will continue to benefit from skilled outpatient physical therapy to address the deficits listed in the problem list box on initial evaluation, provide pt/family education and to maximize pt's level of independence in the home and community environment.     Pt's spiritual, cultural and educational needs considered and pt agreeable to plan of care and goals.     Anticipated barriers to physical therapy: carryover of skills learned    Goals:     Short Term Goals: (4 weeks) Status:    1. Patient will be provided with an individualized home exercise program.  met   2.  Patient will demonstrate improve endurance and activity tolerance as evidenced by ability to perform Nu-step x 10 minutes without rests breaks with heart rate post-activity equivalent to 50-80% of maximum heart rate (or comparable rate of percieved exertion).  ongoing   3.  Patient will decrease complaint of worst low back pain from 8/10 to 7/10 to decrease interference from pain with functional activities. (MDIC for chronic musculoskeletal pain = 1 point) ongoing               Long Term Goals: (4 weeks) Status:    1. Patient will be independent and compliant with updated home exercise program. ongoing   2. Patient will demonstrate improve endurance and activity tolerance as evidenced by ability to perform Nu-step x 15minutes without rests breaks with heart rate post-activity equivalent to 50-80% of maximum heart rate (or comparable rate of percieved exertion).    ongoing   3. Patient will increased lower extremity strength as evidenced by increase in manual muscle test  by 1/2 grade as appropriate to decrease gait deviations consistently.  ongoing      4. The patient will be  provided with any equipment and/or orthotic evaluations and recommendations as indicated on an ongoing basis prior to discharge from therapy.  ongoing   5. Patient will improve his FOTO score from 34%  to at least 52% for improved self perception of functional mobility.(score 0-100, high score indicates greater level of function)    ongoing   6. Patient will decrease complaint of worst low back pain from 8/10 to 7/10 to decrease interference from pain with functional activities. (MDIC for chronic musculoskeletal pain = 1 point)    ongoing             PLAN     Gentle stretching, relaxation, manual traction  Conservative approach as tolerated.  Continue Plan of care    James Moreira, PT   .te

## 2022-08-24 ENCOUNTER — TELEPHONE (OUTPATIENT)
Dept: CARDIOLOGY | Facility: CLINIC | Age: 82
End: 2022-08-24
Payer: MEDICARE

## 2022-08-24 NOTE — TELEPHONE ENCOUNTER
----- Message from Nina Shanks sent at 8/24/2022  9:55 AM CDT -----  Patients daughter dropped off MRI disk. She was in a hurry, contact phone number on file for questions.

## 2022-08-25 ENCOUNTER — CLINICAL SUPPORT (OUTPATIENT)
Dept: REHABILITATION | Facility: HOSPITAL | Age: 82
End: 2022-08-25
Payer: MEDICARE

## 2022-08-25 DIAGNOSIS — M25.60 DECREASED RANGE OF MOTION: Primary | ICD-10-CM

## 2022-08-25 DIAGNOSIS — R53.1 DECREASED STRENGTH: ICD-10-CM

## 2022-08-25 DIAGNOSIS — M62.89 MUSCLE TIGHTNESS: ICD-10-CM

## 2022-08-25 DIAGNOSIS — R26.89 DECREASED FUNCTIONAL MOBILITY: ICD-10-CM

## 2022-08-25 PROCEDURE — 97110 THERAPEUTIC EXERCISES: CPT | Mod: PN,CQ

## 2022-08-27 NOTE — PROGRESS NOTES
Call patient's daughter.  His lab work looks very normal and stable.  Anemia is stable with hematocrit of 36.9.  Thyroid, liver, sugar, kidneys are all normal.  His decline recently has to do with his dementia

## 2022-08-28 ENCOUNTER — PATIENT MESSAGE (OUTPATIENT)
Dept: FAMILY MEDICINE | Facility: CLINIC | Age: 82
End: 2022-08-28

## 2022-08-29 ENCOUNTER — OFFICE VISIT (OUTPATIENT)
Dept: SPINE | Facility: CLINIC | Age: 82
End: 2022-08-29
Payer: MEDICARE

## 2022-08-29 ENCOUNTER — HOSPITAL ENCOUNTER (OUTPATIENT)
Dept: RADIOLOGY | Facility: HOSPITAL | Age: 82
Discharge: HOME | End: 2022-08-29
Attending: PHYSICAL MEDICINE & REHABILITATION
Payer: MEDICARE

## 2022-08-29 ENCOUNTER — TELEPHONE (OUTPATIENT)
Dept: FAMILY MEDICINE | Facility: CLINIC | Age: 82
End: 2022-08-29

## 2022-08-29 VITALS — BODY MASS INDEX: 20.09 KG/M2 | HEIGHT: 67 IN | WEIGHT: 128 LBS | RESPIRATION RATE: 18 BRPM

## 2022-08-29 DIAGNOSIS — M25.572 LEFT ANKLE PAIN, UNSPECIFIED CHRONICITY: ICD-10-CM

## 2022-08-29 DIAGNOSIS — M54.42 ACUTE LEFT-SIDED LOW BACK PAIN WITH LEFT-SIDED SCIATICA: Primary | ICD-10-CM

## 2022-08-29 PROCEDURE — 1159F MED LIST DOCD IN RCRD: CPT | Mod: CPTII,S$GLB,, | Performed by: PHYSICAL MEDICINE & REHABILITATION

## 2022-08-29 PROCEDURE — 1160F PR REVIEW ALL MEDS BY PRESCRIBER/CLIN PHARMACIST DOCUMENTED: ICD-10-PCS | Mod: CPTII,S$GLB,, | Performed by: PHYSICAL MEDICINE & REHABILITATION

## 2022-08-29 PROCEDURE — 73610 X-RAY EXAM OF ANKLE: CPT | Mod: TC,FY,LT

## 2022-08-29 PROCEDURE — 73610 XR ANKLE COMPLETE 3 VIEW LEFT: ICD-10-PCS | Mod: 26,LT,, | Performed by: RADIOLOGY

## 2022-08-29 PROCEDURE — 1125F PR PAIN SEVERITY QUANTIFIED, PAIN PRESENT: ICD-10-PCS | Mod: CPTII,S$GLB,, | Performed by: PHYSICAL MEDICINE & REHABILITATION

## 2022-08-29 PROCEDURE — 99213 PR OFFICE/OUTPT VISIT, EST, LEVL III, 20-29 MIN: ICD-10-PCS | Mod: S$GLB,,, | Performed by: PHYSICAL MEDICINE & REHABILITATION

## 2022-08-29 PROCEDURE — 1101F PR PT FALLS ASSESS DOC 0-1 FALLS W/OUT INJ PAST YR: ICD-10-PCS | Mod: CPTII,S$GLB,, | Performed by: PHYSICAL MEDICINE & REHABILITATION

## 2022-08-29 PROCEDURE — 1125F AMNT PAIN NOTED PAIN PRSNT: CPT | Mod: CPTII,S$GLB,, | Performed by: PHYSICAL MEDICINE & REHABILITATION

## 2022-08-29 PROCEDURE — 1101F PT FALLS ASSESS-DOCD LE1/YR: CPT | Mod: CPTII,S$GLB,, | Performed by: PHYSICAL MEDICINE & REHABILITATION

## 2022-08-29 PROCEDURE — 1160F RVW MEDS BY RX/DR IN RCRD: CPT | Mod: CPTII,S$GLB,, | Performed by: PHYSICAL MEDICINE & REHABILITATION

## 2022-08-29 PROCEDURE — 1159F PR MEDICATION LIST DOCUMENTED IN MEDICAL RECORD: ICD-10-PCS | Mod: CPTII,S$GLB,, | Performed by: PHYSICAL MEDICINE & REHABILITATION

## 2022-08-29 PROCEDURE — 73610 X-RAY EXAM OF ANKLE: CPT | Mod: 26,LT,, | Performed by: RADIOLOGY

## 2022-08-29 PROCEDURE — 3288F PR FALLS RISK ASSESSMENT DOCUMENTED: ICD-10-PCS | Mod: CPTII,S$GLB,, | Performed by: PHYSICAL MEDICINE & REHABILITATION

## 2022-08-29 PROCEDURE — 99213 OFFICE O/P EST LOW 20 MIN: CPT | Mod: S$GLB,,, | Performed by: PHYSICAL MEDICINE & REHABILITATION

## 2022-08-29 PROCEDURE — 3288F FALL RISK ASSESSMENT DOCD: CPT | Mod: CPTII,S$GLB,, | Performed by: PHYSICAL MEDICINE & REHABILITATION

## 2022-08-29 NOTE — TELEPHONE ENCOUNTER
----- Message from Jeyson Waldron MD sent at 8/27/2022 12:07 PM CDT -----  Call patient's daughter.  Urinalysis shows no bladder infection.  Call patient's daughter.  His lab work looks very normal and stable.  Anemia is stable with hematocrit of 36.9.  Thyroid, liver, sugar, kidneys are all normal.  His decline recently has to do with his dementia   Written by Jeyson Waldron MD on 8/27/2022 12:06 PM CDT

## 2022-08-29 NOTE — PROGRESS NOTES
"  SUBJECTIVE:    Patient ID: Jarred Harrison is a 82 y.o. male.    Chief Complaint: Follow-up (CLAUDINE f/u)    He is here for follow-up status post interlaminar injection L5-S1 on 07/29/2022 with Dr. Woodson.  No improvement whatsoever from that procedure.  Pain level is 9/10.  Complaining of left-sided low back pain radiating into the left calf.  Also complaining of some pain around the left ankle.  He can not remember when it started or whether there was any injury associated with that.  He has seen Neurology for some cognitive issues his family member who is with him today says is progressing rapidly.  She wonders if the fact that he has severe pain is playing a role.        Past Medical History:   Diagnosis Date    AI (aortic insufficiency)     Coronary artery disease     GERD (gastroesophageal reflux disease)     Hyperlipidemia     Hypertension     LBBB (left bundle branch block)     SSS (sick sinus syndrome)      Social History     Socioeconomic History    Marital status:    Tobacco Use    Smoking status: Never    Smokeless tobacco: Never   Substance and Sexual Activity    Alcohol use: Not Currently     Past Surgical History:   Procedure Laterality Date    CARDIAC CATHETERIZATION      CATARACT EXTRACTION      CORONARY ANGIOPLASTY  08/29/82015    CORONARY STENT PLACEMENT  2015    Dr Parr    EPIDURAL STEROID INJECTION N/A 7/29/2022    Procedure: Injection, Steroid, Epidural;  Surgeon: Janes Woodson MD;  Location: [facility];  Service: Pain Management;  Laterality: N/A;  L5-s1     FOOT SURGERY Right     KNEE SURGERY Left     right elbow       History reviewed. No pertinent family history.  Vitals:    08/29/22 1333   Resp: 18   Weight: 58.1 kg (128 lb)   Height: 5' 7" (1.702 m)       Review of Systems   Constitutional:  Negative for chills, diaphoresis, fatigue, fever and unexpected weight change.   HENT:  Negative for trouble swallowing.    Eyes:  Negative for visual disturbance.   Respiratory:  Negative for " shortness of breath.    Cardiovascular:  Negative for chest pain.   Gastrointestinal:  Negative for abdominal pain, constipation, diarrhea, nausea and vomiting.   Genitourinary:  Negative for difficulty urinating.   Musculoskeletal:  Negative for arthralgias, back pain, gait problem, joint swelling, myalgias, neck pain and neck stiffness.   Neurological:  Negative for dizziness, speech difficulty, weakness, light-headedness, numbness and headaches.        Objective:      Physical Exam  Neurological:      Mental Status: He is alert and oriented to person, place, and time.      Comments: The appearance of the left ankle is grossly normal.  There is no erythema or increased warmth.  Range of motion is normal.  With dorsiflexion of the left ankle he complains of pain in the posterior portion of the left thigh.  Circulation is intact in the left foot           Assessment:       1. Acute left-sided low back pain with left-sided sciatica    2. Left ankle pain, unspecified chronicity             Plan:     No improvement from interlaminar injection at L5-S1.  The next recommendation would be transforaminal injections on left at L5-S1 and S1.  They wish to defer for now.  He can let me know if they wish to proceed.  Going to get an x-ray on his left ankle.  Consider referral to Podiatry.  Hard to tell whether or not his spine is playing a role in the ankle discomfort.  I do not think it is causing cognitive problems      Acute left-sided low back pain with left-sided sciatica    Left ankle pain, unspecified chronicity  -     X-Ray Ankle Complete 3 View Left; Future; Expected date: 08/29/2022

## 2022-08-29 NOTE — TELEPHONE ENCOUNTER
Spoke to Taylor, daughter, with results verbatim per Dr Waldron. Said that his numbers are still low and his mental decline is going faster. Said hospice nurse said she has never seen someone go down so rapidly and his neurologist said he only has mild cognitive impairment on all of the tests. Said that her other sister, Kelsey, also just sent portal message on this.

## 2022-08-30 ENCOUNTER — CLINICAL SUPPORT (OUTPATIENT)
Dept: REHABILITATION | Facility: HOSPITAL | Age: 82
End: 2022-08-30
Payer: MEDICARE

## 2022-08-30 ENCOUNTER — TELEPHONE (OUTPATIENT)
Dept: SPINE | Facility: CLINIC | Age: 82
End: 2022-08-30
Payer: MEDICARE

## 2022-08-30 ENCOUNTER — PATIENT MESSAGE (OUTPATIENT)
Dept: REHABILITATION | Facility: HOSPITAL | Age: 82
End: 2022-08-30

## 2022-08-30 ENCOUNTER — PATIENT MESSAGE (OUTPATIENT)
Dept: FAMILY MEDICINE | Facility: CLINIC | Age: 82
End: 2022-08-30

## 2022-08-30 DIAGNOSIS — R26.89 DECREASED FUNCTIONAL MOBILITY: ICD-10-CM

## 2022-08-30 DIAGNOSIS — R53.1 DECREASED STRENGTH: ICD-10-CM

## 2022-08-30 DIAGNOSIS — M62.89 MUSCLE TIGHTNESS: ICD-10-CM

## 2022-08-30 DIAGNOSIS — M25.60 DECREASED RANGE OF MOTION: Primary | ICD-10-CM

## 2022-08-30 PROCEDURE — 97110 THERAPEUTIC EXERCISES: CPT | Mod: PN,CQ

## 2022-08-30 NOTE — PROGRESS NOTES
"[facility]   Physical Therapy Treatment Note     Name: Jarred Harrison  Clinic Number: [id number]    Therapy Diagnosis:   Encounter Diagnoses   Name Primary?    Decreased range of motion Yes    Muscle tightness     Decreased functional mobility     Decreased strength      Physician: Jeyson Mccann MD    Visit Date: 8/30/2022     Physician Orders: PT Eval and Treat   Medical Diagnosis from Referral: M54.42 (ICD-10-CM) - Acute left-sided low back pain with left-sided sciatica  Evaluation Date: 8/2/2022  Authorization Period Expiration: 08/01/2023  Plan of Care Expiration: 9/30/2022  Progress Note Due: 9/2/2022  Visit # / Visits authorized: 8/20  PTA visit # 2/5        Precautions: Blood pressure instability, hard of hearing, risk for falls, cognitive impairment, impaired vision     Time In: 1118  Time Out: 1158  Total Appointment Time: 40 minutes     SUBJECTIVE     Pt reports: "I found out I have nodules in my ankle and therapeutic's nothing I can do about it"  Pt states he thinks his back pain is part of his bowels.  Pain at it's worse is a 7-8/10.  He was not given home exercise program yet.  Response to previous treatment: on initial visit  Functional change: none    Pain: 2-3/10  Location: Left posterior hamstrings    OBJECTIVE   .      Treatment     Jarred received the treatments listed below:      therapeutic exercises to develop flexibility and core stabilization for 40 minutes including:    Supine exercises:  Posterior pelvic tilt x 20 reps   bridging with ball squeezes x 10 reps   Ball squeezes x 15 reps   lower trunk rotation with physioball x 20 reps   Double knee to chest with physioball x 20 reps   Manual piriformis stretch bilateral lower extremity 3 x 20 seconds  Manual Hamstring stretch 3 x 20 seconds bilateral  lower extremity    Manual iliotibial band stretch 3 x 20 seconds bilateral lower extremity     Patient Education and Home Exercises     Home Exercises Provided " "and Patient Education Provided     Education provided:   - Home Exercises Instructions  - Educated pt on applying ice if delayed muscle soreness occurs and after exercises at home.  - Not to put wallet in back pocket when sitting.    Written Home Exercises Provided: yes. Additional exercises given to pt and daughter.  Exercises were reviewed and Jarred was able to demonstrate them prior to the end of the session.  Jarred demonstrated fair  understanding of the education provided. See EMR under Patient Instructions for exercises provided during therapy sessions    ASSESSMENT   Jarred provided good effort and participation toward therapeutic interventions today with focus on low back stretching, range of motion and decreasing pain.  Pt stated that some of the stretches "hurt good".  Difficult to assess patients pain level due to cognitive deficits.    Jarred Is progressing well towards his goals.   Pt prognosis is Good.     Pt will continue to benefit from skilled outpatient physical therapy to address the deficits listed in the problem list box on initial evaluation, provide pt/family education and to maximize pt's level of independence in the home and community environment.     Pt's spiritual, cultural and educational needs considered and pt agreeable to plan of care and goals.     Anticipated barriers to physical therapy: carryover of skills learned, cognitive deficits    Goals:     Short Term Goals: (4 weeks) Status:    1. Patient will be provided with an individualized home exercise program.  met   2.  Patient will demonstrate improve endurance and activity tolerance as evidenced by ability to perform Nu-step x 10 minutes without rests breaks with heart rate post-activity equivalent to 50-80% of maximum heart rate (or comparable rate of percieved exertion).  ongoing   3.  Patient will decrease complaint of worst low back pain from 8/10 to 7/10 to decrease interference from pain with functional activities. " (MDIC for chronic musculoskeletal pain = 1 point) ongoing               Long Term Goals: (4 weeks) Status:    1. Patient will be independent and compliant with updated home exercise program. ongoing   2. Patient will demonstrate improve endurance and activity tolerance as evidenced by ability to perform Nu-step x 15minutes without rests breaks with heart rate post-activity equivalent to 50-80% of maximum heart rate (or comparable rate of percieved exertion).    ongoing   3. Patient will increased lower extremity strength as evidenced by increase in manual muscle test  by 1/2 grade as appropriate to decrease gait deviations consistently.  ongoing      4. The patient will be provided with any equipment and/or orthotic evaluations and recommendations as indicated on an ongoing basis prior to discharge from therapy.  ongoing   5. Patient will improve his FOTO score from 34%  to at least 52% for improved self perception of functional mobility.(score 0-100, high score indicates greater level of function)    ongoing   6. Patient will decrease complaint of worst low back pain from 8/10 to 7/10 to decrease interference from pain with functional activities. (MDIC for chronic musculoskeletal pain = 1 point)    ongoing             PLAN     Plan of care Certification: 8/2/2022 to 9/30/2022.     Outpatient Physical Therapy 2 times weekly for 8 weeks to include the following interventions: Cervical/Lumbar Traction, Electrical Stimulation (as indicated and cleared for contraindication) , Gait Training, Manual Therapy, Moist Heat/ Ice, Neuromuscular Re-ed, Orthotic Management and Training, Patient Education, Therapeutic Activities, Therapeutic Exercise and Ultrasound    Continue Plan of care as tolerated    Rosa Mendoza PTA

## 2022-09-07 ENCOUNTER — TELEPHONE (OUTPATIENT)
Dept: REHABILITATION | Facility: HOSPITAL | Age: 82
End: 2022-09-07
Payer: MEDICARE

## 2022-09-13 ENCOUNTER — OFFICE VISIT (OUTPATIENT)
Dept: FAMILY MEDICINE | Facility: CLINIC | Age: 82
End: 2022-09-13
Payer: MEDICARE

## 2022-09-13 VITALS
HEART RATE: 60 BPM | SYSTOLIC BLOOD PRESSURE: 138 MMHG | DIASTOLIC BLOOD PRESSURE: 70 MMHG | WEIGHT: 132 LBS | HEIGHT: 67 IN | BODY MASS INDEX: 20.72 KG/M2

## 2022-09-13 DIAGNOSIS — F41.9 ANXIETY: ICD-10-CM

## 2022-09-13 DIAGNOSIS — R53.1 DECREASED STRENGTH: ICD-10-CM

## 2022-09-13 DIAGNOSIS — M54.12 CERVICAL RADICULOPATHY: ICD-10-CM

## 2022-09-13 DIAGNOSIS — R26.89 DECREASED FUNCTIONAL MOBILITY: ICD-10-CM

## 2022-09-13 DIAGNOSIS — F02.818 LATE ONSET ALZHEIMER'S DEMENTIA WITH BEHAVIORAL DISTURBANCE: Primary | ICD-10-CM

## 2022-09-13 DIAGNOSIS — G30.1 LATE ONSET ALZHEIMER'S DEMENTIA WITH BEHAVIORAL DISTURBANCE: Primary | ICD-10-CM

## 2022-09-13 DIAGNOSIS — E78.2 MIXED HYPERLIPIDEMIA: ICD-10-CM

## 2022-09-13 DIAGNOSIS — M17.12 PRIMARY OSTEOARTHRITIS OF LEFT KNEE: ICD-10-CM

## 2022-09-13 PROCEDURE — 1159F PR MEDICATION LIST DOCUMENTED IN MEDICAL RECORD: ICD-10-PCS | Mod: CPTII,S$GLB,, | Performed by: FAMILY MEDICINE

## 2022-09-13 PROCEDURE — 99214 PR OFFICE/OUTPT VISIT, EST, LEVL IV, 30-39 MIN: ICD-10-PCS | Mod: S$GLB,,, | Performed by: FAMILY MEDICINE

## 2022-09-13 PROCEDURE — 3078F DIAST BP <80 MM HG: CPT | Mod: CPTII,S$GLB,, | Performed by: FAMILY MEDICINE

## 2022-09-13 PROCEDURE — 99214 OFFICE O/P EST MOD 30 MIN: CPT | Mod: S$GLB,,, | Performed by: FAMILY MEDICINE

## 2022-09-13 PROCEDURE — 1159F MED LIST DOCD IN RCRD: CPT | Mod: CPTII,S$GLB,, | Performed by: FAMILY MEDICINE

## 2022-09-13 PROCEDURE — 3075F SYST BP GE 130 - 139MM HG: CPT | Mod: CPTII,S$GLB,, | Performed by: FAMILY MEDICINE

## 2022-09-13 PROCEDURE — 3078F PR MOST RECENT DIASTOLIC BLOOD PRESSURE < 80 MM HG: ICD-10-PCS | Mod: CPTII,S$GLB,, | Performed by: FAMILY MEDICINE

## 2022-09-13 PROCEDURE — 3075F PR MOST RECENT SYSTOLIC BLOOD PRESS GE 130-139MM HG: ICD-10-PCS | Mod: CPTII,S$GLB,, | Performed by: FAMILY MEDICINE

## 2022-09-18 NOTE — PROGRESS NOTES
SUBJECTIVE:    Patient ID: Jarred Harrison is a 82 y.o. male.    Chief Complaint: Leg Pain (Left sciatic pain // brought medications list // abc )    This 82-year-old male recently lost his wife to bladder cancer.  His daughters have been caring for him in his home.  He has been started on Seroquel and they noticed less sundowning behavior.  His bedtime is 7-8 p.m. and he awakens at 3-4 am. he has gained 5 lb since last visit.    He see a gastroenterologist Dr. Peñaloza.  Mariana treated with rectal cream.  MiraLax 7 scoops in 32 oz of water.    Anemia, hematocrit 36    Has been set up for outpatient physical therapy for his lumbar back pain.      Lab Visit on 08/23/2022   Component Date Value Ref Range Status    Sed Rate 08/23/2022 1  0 - 10 mm/Hr Final    CRP 08/23/2022 <0.02  <0.76 mg/dL Final    Vitamin B-12 08/23/2022 251  210 - 950 pg/mL Final    Folate 08/23/2022 >24.8 (H)  4.0 - 24.0 ng/mL Final   Lab Visit on 08/23/2022   Component Date Value Ref Range Status    Specimen UA 08/23/2022 Urine, Clean Catch   Final    Color, UA 08/23/2022 Yellow  Yellow, Straw, Donya Final    Appearance, UA 08/23/2022 Clear  Clear Final    pH, UA 08/23/2022 6.0  5.0 - 8.0 Final    Specific Gravity, UA 08/23/2022 1.015  1.005 - 1.030 Final    Protein, UA 08/23/2022 Negative  Negative Final    Glucose, UA 08/23/2022 Negative  Negative Final    Ketones, UA 08/23/2022 Negative  Negative Final    Bilirubin (UA) 08/23/2022 Negative  Negative Final    Occult Blood UA 08/23/2022 Negative  Negative Final    Nitrite, UA 08/23/2022 Negative  Negative Final    Urobilinogen, UA 08/23/2022 Negative  Negative EU/dL Final    Leukocytes, UA 08/23/2022 Negative  Negative Final   Lab Visit on 08/23/2022   Component Date Value Ref Range Status    WBC 08/23/2022 7.95  3.90 - 12.70 K/uL Final    RBC 08/23/2022 3.82 (L)  4.60 - 6.20 M/uL Final    Hemoglobin 08/23/2022 12.2 (L)  14.0 - 18.0 g/dL Final    Hematocrit 08/23/2022 36.9 (L)  40.0 - 54.0 %  Final    MCV 08/23/2022 97  82 - 98 fL Final    MCH 08/23/2022 31.9 (H)  27.0 - 31.0 pg Final    MCHC 08/23/2022 33.1  32.0 - 36.0 g/dL Final    RDW 08/23/2022 12.7  11.5 - 14.5 % Final    Platelets 08/23/2022 212  150 - 450 K/uL Final    MPV 08/23/2022 9.7  9.2 - 12.9 fL Final    Immature Granulocytes 08/23/2022 0.4  0.0 - 0.5 % Final    Gran # (ANC) 08/23/2022 5.3  1.8 - 7.7 K/uL Final    Immature Grans (Abs) 08/23/2022 0.03  0.00 - 0.04 K/uL Final    Lymph # 08/23/2022 1.5  1.0 - 4.8 K/uL Final    Mono # 08/23/2022 0.7  0.3 - 1.0 K/uL Final    Eos # 08/23/2022 0.3  0.0 - 0.5 K/uL Final    Baso # 08/23/2022 0.07  0.00 - 0.20 K/uL Final    nRBC 08/23/2022 0  0 /100 WBC Final    Gran % 08/23/2022 67.0  38.0 - 73.0 % Final    Lymph % 08/23/2022 19.1  18.0 - 48.0 % Final    Mono % 08/23/2022 9.2  4.0 - 15.0 % Final    Eosinophil % 08/23/2022 3.4  0.0 - 8.0 % Final    Basophil % 08/23/2022 0.9  0.0 - 1.9 % Final    Differential Method 08/23/2022 Automated   Final    Sodium 08/23/2022 138  136 - 145 mmol/L Final    Potassium 08/23/2022 4.3  3.5 - 5.1 mmol/L Final    Chloride 08/23/2022 105  95 - 110 mmol/L Final    CO2 08/23/2022 28  23 - 29 mmol/L Final    Glucose 08/23/2022 104  70 - 110 mg/dL Final    BUN 08/23/2022 20  8 - 23 mg/dL Final    Creatinine 08/23/2022 1.1  0.5 - 1.4 mg/dL Final    Calcium 08/23/2022 10.3  8.7 - 10.5 mg/dL Final    Total Protein 08/23/2022 7.4  6.0 - 8.4 g/dL Final    Albumin 08/23/2022 4.5  3.5 - 5.2 g/dL Final    Total Bilirubin 08/23/2022 1.2 (H)  0.1 - 1.0 mg/dL Final    Alkaline Phosphatase 08/23/2022 39 (L)  55 - 135 U/L Final    AST 08/23/2022 24  10 - 40 U/L Final    ALT 08/23/2022 17  10 - 44 U/L Final    Anion Gap 08/23/2022 5 (L)  8 - 16 mmol/L Final    eGFR 08/23/2022 >60.0  >60 mL/min/1.73 m^2 Final    Cholesterol 08/23/2022 139  120 - 199 mg/dL Final    Triglycerides 08/23/2022 60  30 - 150 mg/dL Final    HDL 08/23/2022 46  40 - 75 mg/dL Final    LDL Cholesterol 08/23/2022  81.0  63.0 - 159.0 mg/dL Final    HDL/Cholesterol Ratio 08/23/2022 33.1  20.0 - 50.0 % Final    Total Cholesterol/HDL Ratio 08/23/2022 3.0  2.0 - 5.0 Final    Non-HDL Cholesterol 08/23/2022 93  mg/dL Final    TSH 08/23/2022 2.340  0.340 - 5.600 uIU/mL Final   Office Visit on 05/02/2022   Component Date Value Ref Range Status    WBC 05/03/2022 6.1  3.8 - 10.8 Thousand/uL Final    RBC 05/03/2022 3.78 (L)  4.20 - 5.80 Million/uL Final    Hemoglobin 05/03/2022 12.3 (L)  13.2 - 17.1 g/dL Final    Hematocrit 05/03/2022 37.3 (L)  38.5 - 50.0 % Final    MCV 05/03/2022 98.7  80.0 - 100.0 fL Final    MCH 05/03/2022 32.5  27.0 - 33.0 pg Final    MCHC 05/03/2022 33.0  32.0 - 36.0 g/dL Final    RDW 05/03/2022 11.9  11.0 - 15.0 % Final    Platelets 05/03/2022 170  140 - 400 Thousand/uL Final    MPV 05/03/2022 10.3  7.5 - 12.5 fL Final    Neutrophils, Abs 05/03/2022 4,008  1,500 - 7,800 cells/uL Final    Lymph # 05/03/2022 1,196  850 - 3,900 cells/uL Final    Mono # 05/03/2022 531  200 - 950 cells/uL Final    Eos # 05/03/2022 305  15 - 500 cells/uL Final    Baso # 05/03/2022 61  0 - 200 cells/uL Final    Neutrophils Relative 05/03/2022 65.7  % Final    Lymph % 05/03/2022 19.6  % Final    Mono % 05/03/2022 8.7  % Final    Eosinophil % 05/03/2022 5.0  % Final    Basophil % 05/03/2022 1.0  % Final    Glucose 05/03/2022 91  65 - 99 mg/dL Final    BUN 05/03/2022 20  7 - 25 mg/dL Final    Creatinine 05/03/2022 1.11  0.70 - 1.11 mg/dL Final    eGFR if non  05/03/2022 61  > OR = 60 mL/min/1.73m2 Final    eGFR if African American 05/03/2022 70  > OR = 60 mL/min/1.73m2 Final    BUN/Creatinine Ratio 05/03/2022 NOT APPLICABLE  6 - 22 (calc) Final    Sodium 05/03/2022 139  135 - 146 mmol/L Final    Potassium 05/03/2022 4.7  3.5 - 5.3 mmol/L Final    Chloride 05/03/2022 104  98 - 110 mmol/L Final    CO2 05/03/2022 29  20 - 32 mmol/L Final    Calcium 05/03/2022 10.1  8.6 - 10.3 mg/dL Final    Total Protein 05/03/2022 6.7  6.1 -  8.1 g/dL Final    Albumin 05/03/2022 4.2  3.6 - 5.1 g/dL Final    Globulin, Total 05/03/2022 2.5  1.9 - 3.7 g/dL (calc) Final    Albumin/Globulin Ratio 05/03/2022 1.7  1.0 - 2.5 (calc) Final    Total Bilirubin 05/03/2022 0.7  0.2 - 1.2 mg/dL Final    Alkaline Phosphatase 05/03/2022 49  35 - 144 U/L Final    AST 05/03/2022 26  10 - 35 U/L Final    ALT 05/03/2022 16  9 - 46 U/L Final    Cholesterol 05/03/2022 118  <200 mg/dL Final    HDL 05/03/2022 44  > OR = 40 mg/dL Final    Triglycerides 05/03/2022 56  <150 mg/dL Final    LDL Cholesterol 05/03/2022 60  mg/dL (calc) Final    HDL/Cholesterol Ratio 05/03/2022 2.7  <5.0 (calc) Final    Non HDL Chol. (LDL+VLDL) 05/03/2022 74  <130 mg/dL (calc) Final    TSH w/reflex to FT4 05/03/2022 3.96  0.40 - 4.50 mIU/L Final    Color, UA 05/03/2022 YELLOW  YELLOW Final    Appearance, UA 05/03/2022 CLEAR  CLEAR Final    Specific Gravity, UA 05/03/2022 1.014  1.001 - 1.035 Final    pH, UA 05/03/2022 7.0  5.0 - 8.0 Final    Glucose, UA 05/03/2022 NEGATIVE  NEGATIVE Final    Bilirubin, UA 05/03/2022 NEGATIVE  NEGATIVE Final    Ketones, UA 05/03/2022 NEGATIVE  NEGATIVE Final    Occult Blood UA 05/03/2022 NEGATIVE  NEGATIVE Final    Protein, UA 05/03/2022 NEGATIVE  NEGATIVE Final    Nitrite, UA 05/03/2022 NEGATIVE  NEGATIVE Final    Leukocytes, UA 05/03/2022 NEGATIVE  NEGATIVE Final    WBC Casts, UA 05/03/2022 NONE SEEN  < OR = 5 /HPF Final    RBC Casts, UA 05/03/2022 0-2  < OR = 2 /HPF Final    Squam Epithel, UA 05/03/2022 NONE SEEN  < OR = 5 /HPF Final    Bacteria, UA 05/03/2022 NONE SEEN  NONE SEEN /HPF Final    Hyaline Casts, UA 05/03/2022 NONE SEEN  NONE SEEN /LPF Final    Reflexive Urine Culture 05/03/2022    Final       Past Medical History:   Diagnosis Date    AI (aortic insufficiency)     Coronary artery disease     GERD (gastroesophageal reflux disease)     Hyperlipidemia     Hypertension     LBBB (left bundle branch block)     SSS (sick sinus syndrome)      Social History      Socioeconomic History    Marital status:    Tobacco Use    Smoking status: Never    Smokeless tobacco: Never   Substance and Sexual Activity    Alcohol use: Not Currently     Past Surgical History:   Procedure Laterality Date    CARDIAC CATHETERIZATION      CATARACT EXTRACTION      CORONARY ANGIOPLASTY  08/29/82015    CORONARY STENT PLACEMENT  2015    Dr Parr    EPIDURAL STEROID INJECTION N/A 7/29/2022    Procedure: Injection, Steroid, Epidural;  Surgeon: Janes Woodson MD;  Location: [facility];  Service: Pain Management;  Laterality: N/A;  L5-s1     FOOT SURGERY Right     KNEE SURGERY Left     right elbow       No family history on file.    Review of patient's allergies indicates:   Allergen Reactions    Hydrocodone        Current Outpatient Medications:     aspirin (ECOTRIN) 81 MG EC tablet, Take 81 mg by mouth once daily., Disp: , Rfl:     EScitalopram oxalate (LEXAPRO) 20 MG tablet, Take 1 tablet (20 mg total) by mouth once daily. (Patient taking differently: Take 10 mg by mouth once daily.), Disp: 90 tablet, Rfl: 3    ezetimibe (ZETIA) 10 mg tablet, Take 1 tablet (10 mg total) by mouth once daily., Disp: 90 tablet, Rfl: 3    fludrocortisone (FLORINEF) 0.1 mg Tab, Take 1 tablet (100 mcg total) by mouth once daily., Disp: 30 tablet, Rfl: 5    hyoscyamine (ANASPAZ,LEVSIN) 0.125 mg Tab, Take 125 mcg by mouth 2 (two) times a day., Disp: , Rfl:     latanoprost 0.005 % ophthalmic solution, , Disp: , Rfl:     midodrine (PROAMATINE) 2.5 MG Tab, Take 1  tab breakfast  And  supper (Patient taking differently: 2.5 mg 3 (three) times daily with meals.), Disp: 180 tablet, Rfl: 1    multivitamin capsule, Take 1 capsule by mouth once daily., Disp: , Rfl:     nitroGLYCERIN (NITROSTAT) 0.4 MG SL tablet, Place 1 tablet (0.4 mg total) under the tongue every 5 (five) minutes as needed for Chest pain., Disp: 30 tablet, Rfl: 1    pantoprazole (PROTONIX) 40 MG tablet, Take 1 tablet (40 mg total) by mouth once daily., Disp:  "90 tablet, Rfl: 3    QUEtiapine (SEROQUEL) 25 MG Tab, Take 1 tablet (25 mg total) by mouth before dinner., Disp: 30 tablet, Rfl: 3    traMADoL (ULTRAM) 50 mg tablet, TAKE 1 TABLET(50 MG) BY MOUTH EVERY 6 HOURS AS NEEDED FOR PAIN, Disp: 28 tablet, Rfl: 0    Review of Systems   Constitutional:  Negative for appetite change, chills, fatigue, fever and unexpected weight change.   HENT:  Negative for congestion, ear pain and trouble swallowing.    Eyes:  Negative for pain, discharge and visual disturbance.   Respiratory:  Negative for apnea, cough, shortness of breath and wheezing.    Cardiovascular:  Negative for chest pain and leg swelling.   Gastrointestinal:  Negative for abdominal pain, blood in stool, constipation, diarrhea, nausea and vomiting.   Endocrine: Negative for cold intolerance, heat intolerance and polydipsia.   Genitourinary:  Negative for dysuria, hematuria, testicular pain and urgency.   Musculoskeletal:  Positive for back pain. Negative for gait problem, joint swelling and myalgias.   Neurological:  Negative for dizziness, seizures and numbness.   Psychiatric/Behavioral:  Positive for confusion (Alzheimer dementia). Negative for agitation, behavioral problems and hallucinations. The patient is not nervous/anxious.         Objective:      Vitals:    09/13/22 0928   BP: 138/70   Pulse: 60   Weight: 59.9 kg (132 lb)   Height: 5' 7" (1.702 m)     Physical Exam  Vitals and nursing note reviewed.   Constitutional:       General: He is not in acute distress.     Appearance: Normal appearance. He is well-developed. He is not toxic-appearing.   HENT:      Head: Normocephalic and atraumatic.      Right Ear: Tympanic membrane and external ear normal.      Left Ear: Tympanic membrane and external ear normal.      Nose: Nose normal.      Mouth/Throat:      Pharynx: Oropharynx is clear.   Eyes:      Pupils: Pupils are equal, round, and reactive to light.   Neck:      Thyroid: No thyromegaly.      Vascular: No " carotid bruit.   Cardiovascular:      Rate and Rhythm: Normal rate and regular rhythm.      Heart sounds: Normal heart sounds. No murmur heard.  Pulmonary:      Effort: Pulmonary effort is normal.      Breath sounds: Normal breath sounds. No wheezing or rales.   Abdominal:      General: Bowel sounds are normal. There is no distension.      Palpations: Abdomen is soft.      Tenderness: There is no abdominal tenderness.   Musculoskeletal:         General: No tenderness or deformity. Normal range of motion.      Cervical back: Normal range of motion and neck supple.      Lumbar back: Normal. No spasms.      Comments: Bends 90 degrees at  waist   Lymphadenopathy:      Cervical: No cervical adenopathy.   Skin:     General: Skin is warm and dry.      Findings: No rash.   Neurological:      Mental Status: He is alert and oriented to person, place, and time.      Cranial Nerves: No cranial nerve deficit.      Coordination: Coordination normal.   Psychiatric:         Behavior: Behavior normal.         Thought Content: Thought content normal.         Judgment: Judgment normal.         Assessment:       1. Late onset Alzheimer's dementia with behavioral disturbance    2. Cervical radiculopathy    3. Anxiety    4. Mixed hyperlipidemia    5. Primary osteoarthritis of left knee    6. Decreased strength    7. Decreased functional mobility         Plan:       Late onset Alzheimer's dementia with behavioral disturbance  Seem to be improving with quetiapine, grief due to the loss of his wife, take quetiapine 25 mg later in the evening 7-8 p.m.   Cervical radiculopathy    Anxiety    Mixed hyperlipidemia    Primary osteoarthritis of left knee    Decreased strength  Resume physical therapy as an outpatient  Decreased functional mobility    Follow up in about 3 months (around 12/13/2022), or Memory loss.        9/17/2022 Jeyson Waldron

## 2022-09-19 ENCOUNTER — CLINICAL SUPPORT (OUTPATIENT)
Dept: REHABILITATION | Facility: HOSPITAL | Age: 82
End: 2022-09-19
Payer: MEDICARE

## 2022-09-19 DIAGNOSIS — R26.89 DECREASED FUNCTIONAL MOBILITY: ICD-10-CM

## 2022-09-19 DIAGNOSIS — M25.60 DECREASED RANGE OF MOTION: Primary | ICD-10-CM

## 2022-09-19 DIAGNOSIS — M62.89 MUSCLE TIGHTNESS: ICD-10-CM

## 2022-09-19 DIAGNOSIS — R53.1 DECREASED STRENGTH: ICD-10-CM

## 2022-09-19 PROCEDURE — 97110 THERAPEUTIC EXERCISES: CPT | Mod: PN

## 2022-09-19 NOTE — PROGRESS NOTES
[facility]   Physical Therapy Treatment Note     Name: Jarred AHN [facility]  Clinic Number: [id number]    Therapy Diagnosis:   Encounter Diagnoses   Name Primary?    Decreased range of motion Yes    Muscle tightness     Decreased functional mobility     Decreased strength      Physician: Jeyson Mccann MD    Visit Date: 2022     Physician Orders: PT Eval and Treat   Medical Diagnosis from Referral: M54.42 (ICD-10-CM) - Acute left-sided low back pain with left-sided sciatica  Evaluation Date: 2022  Authorization Period Expiration: 2023  Plan of Care Expiration: 2022  Visit # / Visits authorized:           Precautions: Blood pressure instability, hard of hearing, risk for falls, cognitive impairment, impaired vision     Time In: 1545  Time Out: 1630  Total Appointment Time: 45 minutes     SUBJECTIVE     Pt reports: no pain on presentation but had discomforts upon getting up this morning.  He was not compliant with home exercise program. Wife had .  Response to previous treatment: Past appointments had been cancelled because of death in the family.  Functional change: more willing to participate and perform the exercises.    Pain: 0/10  Location: none indicated.    OBJECTIVE   .  Ambulates with wide base of support.      Treatment     Jarred received the treatments listed below:      therapeutic exercises to develop flexibility and core stabilization for 45 minutes including:  Nustep all 4's level 1 10' to allow active continuous mobility.  Hamstring stretches.  Single knee to chest 10/10  Double knee to chest 10  Posterior pelvic tilt 20  Manual gentle sustained static traction to lumbar spine. 1-2 minutes x 5    Patient Education and Home Exercises     Home Exercises Provided and Patient Education Provided     Education provided:   - Reinforced active mobility and exercises.    Written Home Exercises Provided: yes. Additional exercises given to pt and daughter.   Exercises were reviewed and Jarred was able to demonstrate them prior to the end of the session.  Jarred demonstrated fair  understanding of the education provided. See EMR under Patient Instructions for exercises provided during therapy sessions    ASSESSMENT   Jarred has right hamstrings actively guarding to stretch. Both knees with mild extension lag on flatlying from hamstring tightness. Patient showed poor cognition when he called his daughter a lady friend who took him here for therapy. Does not appear to be despondent of wife's demise.  Tolerated Rx well.    Jarred Is progressing well towards his goals.   Pt prognosis is Good.     Pt will continue to benefit from skilled outpatient physical therapy to address the deficits listed in the problem list box on initial evaluation, provide pt/family education and to maximize pt's level of independence in the home and community environment.     Pt's spiritual, cultural and educational needs considered and pt agreeable to plan of care and goals.     Anticipated barriers to physical therapy: carryover of skills learned, cognitive deficits    Goals:     Short Term Goals: (4 weeks) Status:    1. Patient will be provided with an individualized home exercise program.  met   2.  Patient will demonstrate improve endurance and activity tolerance as evidenced by ability to perform Nu-step x 10 minutes without rests breaks with heart rate post-activity equivalent to 50-80% of maximum heart rate (or comparable rate of percieved exertion).  met   3.  Patient will decrease complaint of worst low back pain from 8/10 to 7/10 to decrease interference from pain with functional activities. (MDIC for chronic musculoskeletal pain = 1 point) met               Long Term Goals: (4 weeks) Status:    1. Patient will be independent and compliant with updated home exercise program. ongoing   2. Patient will demonstrate improve endurance and activity tolerance as evidenced by ability to  perform Nu-step x 15minutes without rests breaks with heart rate post-activity equivalent to 50-80% of maximum heart rate (or comparable rate of percieved exertion).    partially met   3. Patient will increased lower extremity strength as evidenced by increase in manual muscle test  by 1/2 grade as appropriate to decrease gait deviations consistently.  ongoing      4. The patient will be provided with any equipment and/or orthotic evaluations and recommendations as indicated on an ongoing basis prior to discharge from therapy.  cancelled   5. Patient will improve his FOTO score from 34%  to at least 52% for improved self perception of functional mobility.(score 0-100, high score indicates greater level of function)    ongoing   6. Patient will decrease complaint of worst low back pain from 8/10 to 7/10 to decrease interference from pain with functional activities. (MDIC for chronic musculoskeletal pain = 1 point)    met             PLAN     Plan of care Certification: 8/2/2022 to 9/30/2022.     Outpatient Physical Therapy 2 times weekly for 8 weeks to include the following interventions: Cervical/Lumbar Traction, Electrical Stimulation (as indicated and cleared for contraindication) , Gait Training, Manual Therapy, Moist Heat/ Ice, Neuromuscular Re-ed, Orthotic Management and Training, Patient Education, Therapeutic Activities, Therapeutic Exercise and Ultrasound    Continue Plan of care as tolerated    James Moreira, PT

## 2022-09-22 ENCOUNTER — PATIENT MESSAGE (OUTPATIENT)
Dept: FAMILY MEDICINE | Facility: CLINIC | Age: 82
End: 2022-09-22

## 2022-09-22 ENCOUNTER — CLINICAL SUPPORT (OUTPATIENT)
Dept: REHABILITATION | Facility: HOSPITAL | Age: 82
End: 2022-09-22
Payer: MEDICARE

## 2022-09-22 DIAGNOSIS — R26.89 DECREASED FUNCTIONAL MOBILITY: ICD-10-CM

## 2022-09-22 DIAGNOSIS — R53.1 DECREASED STRENGTH: ICD-10-CM

## 2022-09-22 DIAGNOSIS — M62.89 MUSCLE TIGHTNESS: ICD-10-CM

## 2022-09-22 DIAGNOSIS — M25.60 DECREASED RANGE OF MOTION: Primary | ICD-10-CM

## 2022-09-22 PROCEDURE — 97110 THERAPEUTIC EXERCISES: CPT | Mod: PN

## 2022-09-22 NOTE — PROGRESS NOTES
[facility]   Physical Therapy Treatment Note     Name: Jarred AHN [facility]  Clinic Number: [id number]    Therapy Diagnosis:   Encounter Diagnoses   Name Primary?    Decreased range of motion Yes    Muscle tightness     Decreased functional mobility     Decreased strength      Physician: Jeyson Mccann MD    Visit Date: 9/22/2022     Physician Orders: PT Eval and Treat   Medical Diagnosis from Referral: M54.42 (ICD-10-CM) - Acute left-sided low back pain with left-sided sciatica  Evaluation Date: 8/2/2022  Authorization Period Expiration: 08/01/2023  Plan of Care Expiration: 9/30/2022  Visit # / Visits authorized: 10/20          Precautions: Blood pressure instability, hard of hearing, risk for falls, cognitive impairment, impaired vision     Time In: 1500  Time Out: 1545  Total Appointment Time: 45 minutes     SUBJECTIVE     Pt reports: left side low back discomforts.  He was not compliant with home exercise program.   Response to previous treatment: tolerated well. Active guarding on stretches.  Functional change: less guarding. Increased mobility    Pain: 2/10  Location: left lumbar flank    OBJECTIVE   .  Tends to list to right.    Treatment     Jarred received the treatments listed below:      therapeutic exercises to develop flexibility and core stabilization for 45 minutes including:  Nustep all 4's level 1 10' to allow active continuous mobility.  Hamstring/gastroc stretches.  Single knee to chest 10/10  Double knee to chest 10  Trunk rotation using theraball 15/15  Posterior pelvic tilt 20  Manual gentle sustained static traction to lumbar spine. 1 minute x 5    Patient Education and Home Exercises     Home Exercises Provided and Patient Education Provided     Education provided:   - log rolling during supine to sit..    Written Home Exercises Provided: yes. Additional exercises given to pt and daughter.  Exercises were reviewed and Jarred was able to demonstrate them prior to  the end of the session.  Jarred demonstrated fair  understanding of the education provided. See EMR under Patient Instructions for exercises provided during therapy sessions    ASSESSMENT   Jarred is more relaxed and less anticipatory. Both knees fully extended today. Tolerated Rx well.    Jarred Is progressing well towards his goals.   Pt prognosis is Good.     Pt will continue to benefit from skilled outpatient physical therapy to address the deficits listed in the problem list box on initial evaluation, provide pt/family education and to maximize pt's level of independence in the home and community environment.     Pt's spiritual, cultural and educational needs considered and pt agreeable to plan of care and goals.     Anticipated barriers to physical therapy: carryover of skills learned, cognitive deficits    Goals:     Short Term Goals: (4 weeks) Status:    1. Patient will be provided with an individualized home exercise program.  met   2.  Patient will demonstrate improve endurance and activity tolerance as evidenced by ability to perform Nu-step x 10 minutes without rests breaks with heart rate post-activity equivalent to 50-80% of maximum heart rate (or comparable rate of percieved exertion).  met   3.  Patient will decrease complaint of worst low back pain from 8/10 to 7/10 to decrease interference from pain with functional activities. (MDIC for chronic musculoskeletal pain = 1 point) met               Long Term Goals: (4 weeks) Status:    1. Patient will be independent and compliant with updated home exercise program. ongoing   2. Patient will demonstrate improve endurance and activity tolerance as evidenced by ability to perform Nu-step x 15minutes without rests breaks with heart rate post-activity equivalent to 50-80% of maximum heart rate (or comparable rate of percieved exertion).    partially met   3. Patient will increased lower extremity strength as evidenced by increase in manual muscle test  by  1/2 grade as appropriate to decrease gait deviations consistently.  ongoing      4. The patient will be provided with any equipment and/or orthotic evaluations and recommendations as indicated on an ongoing basis prior to discharge from therapy.  cancelled   5. Patient will improve his FOTO score from 34%  to at least 52% for improved self perception of functional mobility.(score 0-100, high score indicates greater level of function)    ongoing   6. Patient will decrease complaint of worst low back pain from 8/10 to 7/10 to decrease interference from pain with functional activities. (MDIC for chronic musculoskeletal pain = 1 point)    met        PLAN     Gentle stretching/decompression  Continue Plan of care     James Moreira, PT

## 2022-09-26 ENCOUNTER — CLINICAL SUPPORT (OUTPATIENT)
Dept: REHABILITATION | Facility: HOSPITAL | Age: 82
End: 2022-09-26
Payer: MEDICARE

## 2022-09-26 DIAGNOSIS — M25.60 DECREASED RANGE OF MOTION: Primary | ICD-10-CM

## 2022-09-26 DIAGNOSIS — R26.89 DECREASED FUNCTIONAL MOBILITY: ICD-10-CM

## 2022-09-26 DIAGNOSIS — R53.1 DECREASED STRENGTH: ICD-10-CM

## 2022-09-26 DIAGNOSIS — M62.89 MUSCLE TIGHTNESS: ICD-10-CM

## 2022-09-26 PROCEDURE — 97110 THERAPEUTIC EXERCISES: CPT | Mod: PN

## 2022-09-26 NOTE — PROGRESS NOTES
[facility]   Physical Therapy Treatment Note     Name: Jarred [facility]N [facility]  Clinic Number: [id number]    Therapy Diagnosis:   Encounter Diagnoses   Name Primary?    Decreased range of motion Yes    Muscle tightness     Decreased functional mobility     Decreased strength      Physician: Jeyson Mccann MD    Visit Date: 9/26/2022     Physician Orders: PT Eval and Treat   Medical Diagnosis from Referral: M54.42 (ICD-10-CM) - Acute left-sided low back pain with left-sided sciatica  Evaluation Date: 8/2/2022  Authorization Period Expiration: 08/01/2023  Plan of Care Expiration: 9/30/2022  Visit # / Visits authorized: 11/20          Precautions: Blood pressure instability, hard of hearing, risk for falls, cognitive impairment, impaired vision     Time In: 1330  Time Out: 1415  Total Appointment Time: 45 minutes     SUBJECTIVE     Pt reports: no pain on presentation but will point to right glutes as the area of discomforts when asked where they would be when it happens..  He was not compliant with home exercise program.   Response to previous treatment: tolerated well. Unable to relax on demand..  Functional change: more active,    Pain: 0/10  Location: none indicated    OBJECTIVE   .  Difficulty     Treatment     Jarred received the treatments listed below:      therapeutic exercises to develop flexibility and core stabilization for 45 minutes including:  Nustep all 4's level 1 15' to allow active continuous mobility.  Hamstring/gastroc stretches.  Piriformis stretch 30 sec x 3  Single knee to chest 10/10  Double knee to chest 10  Trunk rotation using theraball 15/15  Reverse sit up using thera ball 15  Posterior pelvic tilt 20  Manual gentle sustained static traction to lumbar spine. 1 minute   Patient Education and Home Exercises     Home Exercises Provided and Patient Education Provided     Education provided:   - log rolling during supine to sit..    Written Home Exercises Provided: yes.  Additional exercises given to pt and daughter.  Exercises were reviewed and Jarred was able to demonstrate them prior to the end of the session.  Jarred demonstrated fair  understanding of the education provided. See EMR under Patient Instructions for exercises provided during therapy sessions    ASSESSMENT   Jarred is quick to get up or lie down. Instructed on log rolling to get up or down sidelying.Had been doing better with less verbalization of aggravated pain from activity.  Tolerated Rx well.    Jarred Is progressing well towards his goals.   Pt prognosis is Good.     Pt will continue to benefit from skilled outpatient physical therapy to address the deficits listed in the problem list box on initial evaluation, provide pt/family education and to maximize pt's level of independence in the home and community environment.     Pt's spiritual, cultural and educational needs considered and pt agreeable to plan of care and goals.     Anticipated barriers to physical therapy: carryover of skills learned, cognitive deficits    Goals:     Short Term Goals: (4 weeks) Status:    1. Patient will be provided with an individualized home exercise program.  met   2.  Patient will demonstrate improve endurance and activity tolerance as evidenced by ability to perform Nu-step x 10 minutes without rests breaks with heart rate post-activity equivalent to 50-80% of maximum heart rate (or comparable rate of percieved exertion).  met   3.  Patient will decrease complaint of worst low back pain from 8/10 to 7/10 to decrease interference from pain with functional activities. (MDIC for chronic musculoskeletal pain = 1 point) met               Long Term Goals: (4 weeks) Status:    1. Patient will be independent and compliant with updated home exercise program. ongoing   2. Patient will demonstrate improve endurance and activity tolerance as evidenced by ability to perform Nu-step x 15minutes without rests breaks with heart rate  post-activity equivalent to 50-80% of maximum heart rate (or comparable rate of percieved exertion).     met   3. Patient will increased lower extremity strength as evidenced by increase in manual muscle test  by 1/2 grade as appropriate to decrease gait deviations consistently.  met      4. The patient will be provided with any equipment and/or orthotic evaluations and recommendations as indicated on an ongoing basis prior to discharge from therapy.  cancelled   5. Patient will improve his FOTO score from 34%  to at least 52% for improved self perception of functional mobility.(score 0-100, high score indicates greater level of function)    ongoing   6. Patient will decrease complaint of worst low back pain from 8/10 to 7/10 to decrease interference from pain with functional activities. (MDIC for chronic musculoskeletal pain = 1 point)    met        PLAN     Follow up one more visit.  Discharge Planning.    James Moreira, PT

## 2022-09-28 ENCOUNTER — CLINICAL SUPPORT (OUTPATIENT)
Dept: REHABILITATION | Facility: HOSPITAL | Age: 82
End: 2022-09-28
Payer: MEDICARE

## 2022-09-28 DIAGNOSIS — R26.89 DECREASED FUNCTIONAL MOBILITY: ICD-10-CM

## 2022-09-28 DIAGNOSIS — R53.1 DECREASED STRENGTH: ICD-10-CM

## 2022-09-28 DIAGNOSIS — M62.89 MUSCLE TIGHTNESS: ICD-10-CM

## 2022-09-28 DIAGNOSIS — M25.60 DECREASED RANGE OF MOTION: Primary | ICD-10-CM

## 2022-09-28 PROCEDURE — 97110 THERAPEUTIC EXERCISES: CPT | Mod: PN

## 2022-09-28 NOTE — PATIENT INSTRUCTIONS
Hamstring Stretch - Supine        Lie on back, uninvolved leg raised toward ceiling, towel around knee. Pull thigh toward chest until a stretch is felt on back of thigh. Hold for ___ seconds. If possible, move towel up to calf and repeat. Repeat on involved leg.  Repeat ___ times. Do ___ times per day.     used

## 2022-09-28 NOTE — PROGRESS NOTES
[facility]  Physical Therapy Discharge Note    Name: Jarred Harrison  Clinic Number: [id number]    Therapy Diagnosis:   Encounter Diagnoses   Name Primary?    Decreased range of motion Yes    Muscle tightness     Decreased functional mobility     Decreased strength      Physician: Jeyson Mccann MD    Physician Orders: eval and treat  Medical Diagnosis: acute low back pain with left sided sciatica  Evaluation Date: 8/2/22      Date of Last visit: 09/28/22  Total Visits Received: 12    Today's visit:  Time IN:  1330  Time OUT: 1415  Total time in minutes: 45    Treatment: Thera EX 45  Includes:  Nustep all 4's L1 10'  Hamsring/gastroc stretch   Piriformis stretch  Knee to chest single 5/5; double 5  Posterior pelvic tilt 15  Trunk rotation using theraball 15/15  Bridging 5    Reviewed home stretches.    ASSESSMENT      Pain level 0/10. Patient had been receiving therapy x 6 weeks with initial c/o radiating pain from left glutes down the posterior thigh. Symptoms had been resolved with mild recurrence and limited to left glutes only. Admits poor compliance with home exercises.    Discharge reason: Patient has met all of his/her goals and Patient has reached the maximum rehab potential for the present time    Discharge FOTO Score: not assessed. Not appropriate.    Goals:   Short Term Goals: (4 weeks) Status:    1. Patient will be provided with an individualized home exercise program.  met   2.  Patient will demonstrate improve endurance and activity tolerance as evidenced by ability to perform Nu-step x 10 minutes without rests breaks with heart rate post-activity equivalent to 50-80% of maximum heart rate (or comparable rate of percieved exertion).  met   3.  Patient will decrease complaint of worst low back pain from 8/10 to 7/10 to decrease interference from pain with functional activities. (MDIC for chronic musculoskeletal pain = 1 point) met               Long Term Goals: (4 weeks)  Status:    1. Patient will be independent and compliant with updated home exercise program. ongoing   2. Patient will demonstrate improve endurance and activity tolerance as evidenced by ability to perform Nu-step x 15minutes without rests breaks with heart rate post-activity equivalent to 50-80% of maximum heart rate (or comparable rate of percieved exertion).    partially met    Not very willing.   3. Patient will increased lower extremity strength as evidenced by increase in manual muscle test  by 1/2 grade as appropriate to decrease gait deviations consistently.  met      4. The patient will be provided with any equipment and/or orthotic evaluations and recommendations as indicated on an ongoing basis prior to discharge from therapy.  cancelled   5. Patient will improve his FOTO score from 34%  to at least 52% for improved self perception of functional mobility.(score 0-100, high score indicates greater level of function)    Cancelled  Not appropriate   6. Patient will decrease complaint of worst low back pain from 8/10 to 7/10 to decrease interference from pain with functional activities. (MDIC for chronic musculoskeletal pain = 1 point)    met              PLAN   This patient is discharged from Physical Therapy      James Moreira PT

## 2022-10-03 ENCOUNTER — PATIENT MESSAGE (OUTPATIENT)
Dept: FAMILY MEDICINE | Facility: CLINIC | Age: 82
End: 2022-10-03

## 2022-10-04 ENCOUNTER — PATIENT MESSAGE (OUTPATIENT)
Dept: FAMILY MEDICINE | Facility: CLINIC | Age: 82
End: 2022-10-04

## 2022-10-04 DIAGNOSIS — I95.0 IDIOPATHIC HYPOTENSION: ICD-10-CM

## 2022-10-04 RX ORDER — MIDODRINE HYDROCHLORIDE 2.5 MG/1
2.5 TABLET ORAL
Qty: 90 TABLET | Refills: 3 | Status: SHIPPED | OUTPATIENT
Start: 2022-10-04

## 2022-12-04 ENCOUNTER — HOSPITAL ENCOUNTER (INPATIENT)
Facility: HOSPITAL | Age: 82
LOS: 1 days | Discharge: HOME | DRG: 178 | End: 2022-12-07
Attending: EMERGENCY MEDICINE | Admitting: INTERNAL MEDICINE
Payer: MEDICARE

## 2022-12-04 DIAGNOSIS — I63.9 STROKE: ICD-10-CM

## 2022-12-04 DIAGNOSIS — G93.41 ENCEPHALOPATHY, METABOLIC: Primary | ICD-10-CM

## 2022-12-04 DIAGNOSIS — U07.1 COVID-19: ICD-10-CM

## 2022-12-04 DIAGNOSIS — G93.41 ACUTE METABOLIC ENCEPHALOPATHY: ICD-10-CM

## 2022-12-04 DIAGNOSIS — G45.9 TIA (TRANSIENT ISCHEMIC ATTACK): ICD-10-CM

## 2022-12-04 DIAGNOSIS — F41.9 ANXIETY: ICD-10-CM

## 2022-12-04 DIAGNOSIS — R41.82 ALTERED MENTAL STATUS, UNSPECIFIED ALTERED MENTAL STATUS TYPE: ICD-10-CM

## 2022-12-04 DIAGNOSIS — R07.9 CHEST PAIN: ICD-10-CM

## 2022-12-04 PROBLEM — F03.918 DEMENTIA WITH BEHAVIORAL DISTURBANCE: Status: ACTIVE | Noted: 2022-12-04

## 2022-12-04 LAB
ALBUMIN SERPL BCP-MCNC: 4.7 G/DL (ref 3.5–5.2)
ALP SERPL-CCNC: 54 U/L (ref 55–135)
ALT SERPL W/O P-5'-P-CCNC: 16 U/L (ref 10–44)
ANION GAP SERPL CALC-SCNC: 7 MMOL/L (ref 8–16)
AST SERPL-CCNC: 26 U/L (ref 10–40)
BASOPHILS # BLD AUTO: 0.03 K/UL (ref 0–0.2)
BASOPHILS NFR BLD: 0.4 % (ref 0–1.9)
BILIRUB SERPL-MCNC: 1.5 MG/DL (ref 0.1–1)
BILIRUB UR QL STRIP: NEGATIVE
BNP SERPL-MCNC: 126 PG/ML (ref 0–99)
BUN SERPL-MCNC: 16 MG/DL (ref 8–23)
CALCIUM SERPL-MCNC: 10.1 MG/DL (ref 8.7–10.5)
CHLORIDE SERPL-SCNC: 102 MMOL/L (ref 95–110)
CHOLEST SERPL-MCNC: 155 MG/DL (ref 120–199)
CHOLEST/HDLC SERPL: 2.8 {RATIO} (ref 2–5)
CLARITY UR: CLEAR
CO2 SERPL-SCNC: 27 MMOL/L (ref 23–29)
COLOR UR: YELLOW
CREAT SERPL-MCNC: 0.9 MG/DL (ref 0.5–1.4)
CREAT SERPL-MCNC: 1 MG/DL (ref 0.5–1.4)
DIFFERENTIAL METHOD: ABNORMAL
EOSINOPHIL # BLD AUTO: 0.2 K/UL (ref 0–0.5)
EOSINOPHIL NFR BLD: 1.8 % (ref 0–8)
ERYTHROCYTE [DISTWIDTH] IN BLOOD BY AUTOMATED COUNT: 12.4 % (ref 11.5–14.5)
EST. GFR  (NO RACE VARIABLE): >60 ML/MIN/1.73 M^2
GLUCOSE SERPL-MCNC: 107 MG/DL (ref 70–110)
GLUCOSE SERPL-MCNC: 112 MG/DL (ref 70–110)
GLUCOSE UR QL STRIP: NEGATIVE
HCT VFR BLD AUTO: 39.5 % (ref 40–54)
HDLC SERPL-MCNC: 55 MG/DL (ref 40–75)
HDLC SERPL: 35.5 % (ref 20–50)
HGB BLD-MCNC: 13.2 G/DL (ref 14–18)
HGB UR QL STRIP: NEGATIVE
IMM GRANULOCYTES # BLD AUTO: 0.03 K/UL (ref 0–0.04)
IMM GRANULOCYTES NFR BLD AUTO: 0.4 % (ref 0–0.5)
INFLUENZA A, MOLECULAR: NEGATIVE
INFLUENZA B, MOLECULAR: NEGATIVE
INR PPP: 1.2
KETONES UR QL STRIP: ABNORMAL
LDLC SERPL CALC-MCNC: 91 MG/DL (ref 63–159)
LEUKOCYTE ESTERASE UR QL STRIP: NEGATIVE
LYMPHOCYTES # BLD AUTO: 0.4 K/UL (ref 1–4.8)
LYMPHOCYTES NFR BLD: 4.7 % (ref 18–48)
MCH RBC QN AUTO: 33.2 PG (ref 27–31)
MCHC RBC AUTO-ENTMCNC: 33.4 G/DL (ref 32–36)
MCV RBC AUTO: 100 FL (ref 82–98)
MONOCYTES # BLD AUTO: 0.8 K/UL (ref 0.3–1)
MONOCYTES NFR BLD: 9.1 % (ref 4–15)
NEUTROPHILS # BLD AUTO: 7.2 K/UL (ref 1.8–7.7)
NEUTROPHILS NFR BLD: 83.6 % (ref 38–73)
NITRITE UR QL STRIP: NEGATIVE
NONHDLC SERPL-MCNC: 100 MG/DL
NRBC BLD-RTO: 0 /100 WBC
PH UR STRIP: 7 [PH] (ref 5–8)
PLATELET # BLD AUTO: 158 K/UL (ref 150–450)
PMV BLD AUTO: 10.5 FL (ref 9.2–12.9)
POTASSIUM SERPL-SCNC: 3.9 MMOL/L (ref 3.5–5.1)
PROT SERPL-MCNC: 8 G/DL (ref 6–8.4)
PROT UR QL STRIP: NEGATIVE
PROTHROMBIN TIME: 14.3 SEC (ref 11.4–13.7)
RBC # BLD AUTO: 3.97 M/UL (ref 4.6–6.2)
SAMPLE: NORMAL
SARS-COV-2 RDRP RESP QL NAA+PROBE: POSITIVE
SODIUM SERPL-SCNC: 136 MMOL/L (ref 136–145)
SP GR UR STRIP: >1.03 (ref 1–1.03)
SPECIMEN SOURCE: NORMAL
TRIGL SERPL-MCNC: 45 MG/DL (ref 30–150)
TROPONIN I SERPL HS-MCNC: 6 PG/ML (ref 0–14.9)
TSH SERPL DL<=0.005 MIU/L-ACNC: 2.47 UIU/ML (ref 0.34–5.6)
URN SPEC COLLECT METH UR: ABNORMAL
UROBILINOGEN UR STRIP-ACNC: NEGATIVE EU/DL
WBC # BLD AUTO: 8.55 K/UL (ref 3.9–12.7)

## 2022-12-04 PROCEDURE — 84443 ASSAY THYROID STIM HORMONE: CPT | Performed by: EMERGENCY MEDICINE

## 2022-12-04 PROCEDURE — G0378 HOSPITAL OBSERVATION PER HR: HCPCS

## 2022-12-04 PROCEDURE — 96374 THER/PROPH/DIAG INJ IV PUSH: CPT

## 2022-12-04 PROCEDURE — 81003 URINALYSIS AUTO W/O SCOPE: CPT | Performed by: EMERGENCY MEDICINE

## 2022-12-04 PROCEDURE — 93010 ELECTROCARDIOGRAM REPORT: CPT | Mod: ,,, | Performed by: INTERNAL MEDICINE

## 2022-12-04 PROCEDURE — 83880 ASSAY OF NATRIURETIC PEPTIDE: CPT | Performed by: EMERGENCY MEDICINE

## 2022-12-04 PROCEDURE — 85025 COMPLETE CBC W/AUTO DIFF WBC: CPT | Performed by: EMERGENCY MEDICINE

## 2022-12-04 PROCEDURE — 82962 GLUCOSE BLOOD TEST: CPT

## 2022-12-04 PROCEDURE — 63600175 PHARM REV CODE 636 W HCPCS: Performed by: FAMILY MEDICINE

## 2022-12-04 PROCEDURE — 63600175 PHARM REV CODE 636 W HCPCS: Performed by: INTERNAL MEDICINE

## 2022-12-04 PROCEDURE — 84484 ASSAY OF TROPONIN QUANT: CPT | Performed by: EMERGENCY MEDICINE

## 2022-12-04 PROCEDURE — 99291 CRITICAL CARE FIRST HOUR: CPT

## 2022-12-04 PROCEDURE — 25000003 PHARM REV CODE 250: Performed by: INTERNAL MEDICINE

## 2022-12-04 PROCEDURE — 25500020 PHARM REV CODE 255: Performed by: EMERGENCY MEDICINE

## 2022-12-04 PROCEDURE — 93005 ELECTROCARDIOGRAM TRACING: CPT | Performed by: INTERNAL MEDICINE

## 2022-12-04 PROCEDURE — 93010 EKG 12-LEAD: ICD-10-PCS | Mod: ,,, | Performed by: INTERNAL MEDICINE

## 2022-12-04 PROCEDURE — 85610 PROTHROMBIN TIME: CPT | Performed by: EMERGENCY MEDICINE

## 2022-12-04 PROCEDURE — 80061 LIPID PANEL: CPT | Performed by: EMERGENCY MEDICINE

## 2022-12-04 PROCEDURE — 80053 COMPREHEN METABOLIC PANEL: CPT | Performed by: EMERGENCY MEDICINE

## 2022-12-04 PROCEDURE — 96372 THER/PROPH/DIAG INJ SC/IM: CPT | Performed by: INTERNAL MEDICINE

## 2022-12-04 PROCEDURE — U0002 COVID-19 LAB TEST NON-CDC: HCPCS | Performed by: EMERGENCY MEDICINE

## 2022-12-04 PROCEDURE — 87502 INFLUENZA DNA AMP PROBE: CPT | Performed by: EMERGENCY MEDICINE

## 2022-12-04 RX ORDER — TALC
9 POWDER (GRAM) TOPICAL NIGHTLY PRN
Status: DISCONTINUED | OUTPATIENT
Start: 2022-12-04 | End: 2022-12-06

## 2022-12-04 RX ORDER — ONDANSETRON 2 MG/ML
4 INJECTION INTRAMUSCULAR; INTRAVENOUS EVERY 6 HOURS PRN
Status: DISCONTINUED | OUTPATIENT
Start: 2022-12-04 | End: 2022-12-07 | Stop reason: HOSPADM

## 2022-12-04 RX ORDER — PANTOPRAZOLE SODIUM 40 MG/1
40 TABLET, DELAYED RELEASE ORAL DAILY
Status: DISCONTINUED | OUTPATIENT
Start: 2022-12-05 | End: 2022-12-07 | Stop reason: HOSPADM

## 2022-12-04 RX ORDER — ASPIRIN 81 MG/1
81 TABLET ORAL DAILY
Status: DISCONTINUED | OUTPATIENT
Start: 2022-12-05 | End: 2022-12-07 | Stop reason: HOSPADM

## 2022-12-04 RX ORDER — ENOXAPARIN SODIUM 100 MG/ML
40 INJECTION SUBCUTANEOUS EVERY 24 HOURS
Status: DISCONTINUED | OUTPATIENT
Start: 2022-12-04 | End: 2022-12-07 | Stop reason: HOSPADM

## 2022-12-04 RX ORDER — ACETAMINOPHEN 500 MG
1000 TABLET ORAL EVERY 8 HOURS PRN
Status: DISCONTINUED | OUTPATIENT
Start: 2022-12-04 | End: 2022-12-07 | Stop reason: HOSPADM

## 2022-12-04 RX ORDER — SIMETHICONE 80 MG
1 TABLET,CHEWABLE ORAL 4 TIMES DAILY PRN
Status: DISCONTINUED | OUTPATIENT
Start: 2022-12-04 | End: 2022-12-07 | Stop reason: HOSPADM

## 2022-12-04 RX ORDER — GLUCAGON 1 MG
1 KIT INJECTION
Status: DISCONTINUED | OUTPATIENT
Start: 2022-12-04 | End: 2022-12-07 | Stop reason: HOSPADM

## 2022-12-04 RX ORDER — IBUPROFEN 200 MG
16 TABLET ORAL
Status: DISCONTINUED | OUTPATIENT
Start: 2022-12-04 | End: 2022-12-07 | Stop reason: HOSPADM

## 2022-12-04 RX ORDER — MORPHINE SULFATE 4 MG/ML
4 INJECTION, SOLUTION INTRAMUSCULAR; INTRAVENOUS EVERY 4 HOURS PRN
Status: DISCONTINUED | OUTPATIENT
Start: 2022-12-04 | End: 2022-12-06

## 2022-12-04 RX ORDER — FLUDROCORTISONE ACETATE 0.1 MG/1
100 TABLET ORAL DAILY
Status: DISCONTINUED | OUTPATIENT
Start: 2022-12-05 | End: 2022-12-07 | Stop reason: HOSPADM

## 2022-12-04 RX ORDER — NALOXONE HCL 0.4 MG/ML
0.02 VIAL (ML) INJECTION
Status: DISCONTINUED | OUTPATIENT
Start: 2022-12-04 | End: 2022-12-07 | Stop reason: HOSPADM

## 2022-12-04 RX ORDER — ESCITALOPRAM OXALATE 10 MG/1
20 TABLET ORAL DAILY
Status: DISCONTINUED | OUTPATIENT
Start: 2022-12-05 | End: 2022-12-07 | Stop reason: HOSPADM

## 2022-12-04 RX ORDER — ACETAMINOPHEN 325 MG/1
650 TABLET ORAL EVERY 4 HOURS PRN
Status: DISCONTINUED | OUTPATIENT
Start: 2022-12-04 | End: 2022-12-07 | Stop reason: HOSPADM

## 2022-12-04 RX ORDER — QUETIAPINE FUMARATE 25 MG/1
25 TABLET, FILM COATED ORAL
Status: DISCONTINUED | OUTPATIENT
Start: 2022-12-04 | End: 2022-12-07 | Stop reason: HOSPADM

## 2022-12-04 RX ORDER — IBUPROFEN 200 MG
24 TABLET ORAL
Status: DISCONTINUED | OUTPATIENT
Start: 2022-12-04 | End: 2022-12-07 | Stop reason: HOSPADM

## 2022-12-04 RX ORDER — MIDODRINE HYDROCHLORIDE 2.5 MG/1
2.5 TABLET ORAL
Status: DISCONTINUED | OUTPATIENT
Start: 2022-12-05 | End: 2022-12-05

## 2022-12-04 RX ORDER — EZETIMIBE 10 MG/1
10 TABLET ORAL DAILY
Status: DISCONTINUED | OUTPATIENT
Start: 2022-12-05 | End: 2022-12-07 | Stop reason: HOSPADM

## 2022-12-04 RX ORDER — SODIUM CHLORIDE 0.9 % (FLUSH) 0.9 %
10 SYRINGE (ML) INJECTION EVERY 6 HOURS PRN
Status: DISCONTINUED | OUTPATIENT
Start: 2022-12-04 | End: 2022-12-07 | Stop reason: HOSPADM

## 2022-12-04 RX ORDER — HYDROCODONE BITARTRATE AND ACETAMINOPHEN 5; 325 MG/1; MG/1
1 TABLET ORAL EVERY 6 HOURS PRN
Status: DISCONTINUED | OUTPATIENT
Start: 2022-12-04 | End: 2022-12-07 | Stop reason: HOSPADM

## 2022-12-04 RX ORDER — PROCHLORPERAZINE EDISYLATE 5 MG/ML
5 INJECTION INTRAMUSCULAR; INTRAVENOUS EVERY 6 HOURS PRN
Status: DISCONTINUED | OUTPATIENT
Start: 2022-12-04 | End: 2022-12-07 | Stop reason: HOSPADM

## 2022-12-04 RX ORDER — LORAZEPAM 2 MG/ML
0.5 INJECTION INTRAMUSCULAR ONCE
Status: COMPLETED | OUTPATIENT
Start: 2022-12-04 | End: 2022-12-04

## 2022-12-04 RX ORDER — MAG HYDROX/ALUMINUM HYD/SIMETH 200-200-20
30 SUSPENSION, ORAL (FINAL DOSE FORM) ORAL 4 TIMES DAILY PRN
Status: DISCONTINUED | OUTPATIENT
Start: 2022-12-04 | End: 2022-12-07 | Stop reason: HOSPADM

## 2022-12-04 RX ORDER — QUETIAPINE FUMARATE 25 MG/1
25 TABLET, FILM COATED ORAL
Status: DISCONTINUED | OUTPATIENT
Start: 2022-12-05 | End: 2022-12-04

## 2022-12-04 RX ADMIN — ENOXAPARIN SODIUM 40 MG: 100 INJECTION SUBCUTANEOUS at 08:12

## 2022-12-04 RX ADMIN — IOHEXOL 100 ML: 350 INJECTION, SOLUTION INTRAVENOUS at 08:12

## 2022-12-04 RX ADMIN — Medication 9 MG: at 10:12

## 2022-12-04 RX ADMIN — QUETIAPINE 25 MG: 25 TABLET ORAL at 08:12

## 2022-12-04 RX ADMIN — LORAZEPAM 0.5 MG: 2 INJECTION INTRAMUSCULAR; INTRAVENOUS at 10:12

## 2022-12-04 NOTE — ED TRIAGE NOTES
Family friends at side states daughter called them and advised he seemed confused; pt hx onset ; spoke with daughter on phone who advised that yesterday evening he seemed more lethargic and this AM he seemed more confused and speech seemed slurred; states LKN at his baseline yesterday AM however states he has seemed more lethargic recently; speech currently clear NIH 0

## 2022-12-04 NOTE — H&P
[facility] History & Physical Examination   Patient Name: Jarred Harrison  MRN: [id number]  Patient Class: OP- Observation   Admission Date: 12/4/2022  8:24 AM  Length of Stay: 0  Attending Physician: Marika Hdez MD  Primary Care Provider: Jeyson Waldron MD  Face-to-Face encounter date: 12/04/2022  Code Status: Full Code  Chief Complaint: Altered Mental Status        HISTORY OF PRESENT ILLNESS:   Jarred Harrison is a 82 y.o. White male with known history of dementia presented with 2 day history of confusion. Patient symptoms started with lethargy last night. This morning he woke up with confusion and right sided facial droop with slurred speech. Symptoms resolved by the time he presented to the ER. NIH zero on presentation. Head CT and CTA negative. Hospital medicine consulted for admission. During my evaluation, alert but pleasantly confused and not able to give me any reliable history.     REVIEW OF SYSTEMS:   10 Point Review of System was performed and was found to be negative except for that mentioned already in the HPI above.     PAST MEDICAL HISTORY:     Past Medical History:   Diagnosis Date    AI (aortic insufficiency)     Coronary artery disease     GERD (gastroesophageal reflux disease)     Hyperlipidemia     Hypertension     LBBB (left bundle branch block)     Squamous cell carcinoma in situ (SCCIS) of scalp     SSS (sick sinus syndrome)        PAST SURGICAL HISTORY:     Past Surgical History:   Procedure Laterality Date    CARDIAC CATHETERIZATION      CATARACT EXTRACTION      CORONARY ANGIOPLASTY  08/29/82015    CORONARY STENT PLACEMENT  2015    Dr Parr    EPIDURAL STEROID INJECTION N/A 7/29/2022    Procedure: Injection, Steroid, Epidural;  Surgeon: Janes Woodson MD;  Location: [facility];  Service: Pain Management;  Laterality: N/A;  L5-s1     FOOT SURGERY Right     KNEE SURGERY Left     right elbow         ALLERGIES:   Hydrocodone    FAMILY HISTORY:   Reviewed  but not pertinent     SOCIAL HISTORY:     Social History     Tobacco Use    Smoking status: Never    Smokeless tobacco: Never   Substance Use Topics    Alcohol use: Not Currently        Social History     Substance and Sexual Activity   Sexual Activity Not on file        HOME MEDICATIONS:     Prior to Admission medications    Medication Sig Start Date End Date Taking? Authorizing Provider   aspirin (ECOTRIN) 81 MG EC tablet Take 81 mg by mouth once daily.    Historical Provider   EScitalopram oxalate (LEXAPRO) 20 MG tablet Take 1 tablet (20 mg total) by mouth once daily.  Patient taking differently: Take 10 mg by mouth once daily. 6/28/22   Jeyson Waldron MD   ezetimibe (ZETIA) 10 mg tablet Take 1 tablet (10 mg total) by mouth once daily. 6/28/22 6/28/23  Jeyson Waldron MD   fludrocortisone (FLORINEF) 0.1 mg Tab TAKE 1 TABLET BY MOUTH ONCE DAILY  Patient taking differently: Take 100 mcg by mouth once daily. 11/18/22   Carolyn Minaya NP   hyoscyamine (ANASPAZ,LEVSIN) 0.125 mg Tab Take 125 mcg by mouth 2 (two) times a day.    Historical Provider   latanoprost 0.005 % ophthalmic solution Place 1 drop into both eyes every evening. 1/5/20   Historical Provider   midodrine (PROAMATINE) 2.5 MG Tab Take 1 tablet (2.5 mg total) by mouth 3 (three) times daily with meals. 10/4/22   Jeyson Waldron MD   multivitamin capsule Take 1 capsule by mouth once daily.    Historical Provider   nitroGLYCERIN (NITROSTAT) 0.4 MG SL tablet Place 1 tablet (0.4 mg total) under the tongue every 5 (five) minutes as needed for Chest pain. 7/14/21 9/13/22  Carolyn Cox, JUAN PABLO   pantoprazole (PROTONIX) 40 MG tablet Take 1 tablet (40 mg total) by mouth once daily. 6/28/22   Jeyson Waldron MD   QUEtiapine (SEROQUEL) 25 MG Tab Take 1 tablet (25 mg total) by mouth before dinner. 8/22/22 8/22/23  Jeyson Waldron MD   traMADoL (ULTRAM) 50 mg tablet TAKE 1 TABLET(50 MG) BY MOUTH EVERY 6 HOURS AS NEEDED FOR PAIN  Patient taking  differently: Take 50 mg by mouth every 6 (six) hours as needed. TAKE 1 TABLET(50 MG) BY MOUTH EVERY 6 HOURS AS NEEDED FOR PAIN 8/1/22   Jeyson Mccann MD         PHYSICAL EXAM:   /60   Pulse 69   Temp 97.8 °F (36.6 °C) (Oral)   Resp 18   Wt 59 kg (130 lb)   SpO2 96%   BMI 20.36 kg/m²   Vitals Reviewed  General appearance: non-toxic and not acutely ill-appearingWhite male in no apparent distress.  Skin: No Rash.   Neuro: Motor and sensory exams grossly intact. Good tone. Power in all 4 extremities 5/5.   HENT: Atraumatic head. Moist mucous membranes of oral cavity.  Eyes: Normal extraocular movements.   Neck: Supple. No evidence of lymphadenopathy. No thyroidomegaly.  Lungs: Clear to auscultation bilaterally. No wheezing present.   Heart: Regular rate and rhythm. S1 and S2 present with no murmurs/gallop/rub. No pedal edema. No JVD present.   Abdomen: Soft, non-distended, non-tender. No rebound tenderness/guarding. No masses or organomegaly. Bowel sounds are normal. Bladder is not palpable.   Extremities: No cyanosis, clubbing.  Psych/mental status: Alert and oriented. Cooperative. Responds appropriately to questions.   EMERGENCY DEPARTMENT LABS AND IMAGING:     Labs Reviewed   CBC W/ AUTO DIFFERENTIAL - Abnormal; Notable for the following components:       Result Value    RBC 3.97 (*)     Hemoglobin 13.2 (*)     Hematocrit 39.5 (*)      (*)     MCH 33.2 (*)     Lymph # 0.4 (*)     Gran % 83.6 (*)     Lymph % 4.7 (*)     All other components within normal limits   COMPREHENSIVE METABOLIC PANEL - Abnormal; Notable for the following components:    Glucose 112 (*)     Total Bilirubin 1.5 (*)     Alkaline Phosphatase 54 (*)     Anion Gap 7 (*)     All other components within normal limits   PROTIME-INR - Abnormal; Notable for the following components:    PT 14.3 (*)     All other components within normal limits   B-TYPE NATRIURETIC PEPTIDE - Abnormal; Notable for the following components:    BNP  126 (*)     All other components within normal limits   URINALYSIS, REFLEX TO URINE CULTURE - Abnormal; Notable for the following components:    Specific Gravity, UA >1.030 (*)     Ketones, UA Trace (*)     All other components within normal limits    Narrative:     Specimen Source->Urine   SARS-COV-2 RNA AMPLIFICATION, QUAL - Abnormal; Notable for the following components:    SARS-CoV-2 RNA, Amplification, Qual Positive (*)     All other components within normal limits   TSH   LIPID PANEL   TROPONIN I HIGH SENSITIVITY   INFLUENZA A AND B ANTIGEN    Narrative:     Specimen Source->Nasopharyngeal Swab   POCT GLUCOSE   ISTAT CREATININE   POCT CREATININE       CTA Head and Neck (xpd)   Final Result      CT HEAD FOR STROKE   Final Result      X-Ray Chest AP Portable    (Results Pending)       ASSESSMENT & PLAN:   Jarred Harrison is a 82 y.o. male admitted for    Active Hospital Problems    Diagnosis    *TIA (transient ischemic attack)    Encephalopathy, metabolic    Dementia with behavioral disturbance - likely worsening     Plan  Admit to Telemetry in isolation, COVID 19 +ve however no respiratory symptoms,  Echocardiography with bubble study,  CT and CTA Head, MRI brain with out contrast, Echocardiography with bubble study, A1c and Lipid panel for Risk factor Modification, PT/OT evaluation, Speech Therapy evaluation, Infection workup including CXR and UA, Supportive Care with anti-emetics, Replacement of Electrolytes, and resume home medications      Diet: Cardiac    DVT Prophylaxis: low molecular weight heparin and Encourage ambulation. OOB as tolerated.     Discharge Planning and Disposition:  Home with assistance from family    Expected LOS: 1-2 days  ________________________________________________________________________________    Face-to-Face encounter date: 12/04/2022  Encounter included review of the medical records, interviewing and examining the patient face-to-face, discussion with family and other health  care providers including emergency medicine physician, admission orders, interpreting lab/test results and formulating a plan of care.   Medical Decision Making during this encounter was High Complexity due to TIA/Stroke  ________________________________________________________________________________    INPATIENT LIST OF MEDICATIONS   No current facility-administered medications for this encounter.    Current Outpatient Medications:     aspirin (ECOTRIN) 81 MG EC tablet, Take 81 mg by mouth once daily., Disp: , Rfl:     EScitalopram oxalate (LEXAPRO) 20 MG tablet, Take 1 tablet (20 mg total) by mouth once daily. (Patient taking differently: Take 10 mg by mouth once daily.), Disp: 90 tablet, Rfl: 3    ezetimibe (ZETIA) 10 mg tablet, Take 1 tablet (10 mg total) by mouth once daily., Disp: 90 tablet, Rfl: 3    fludrocortisone (FLORINEF) 0.1 mg Tab, TAKE 1 TABLET BY MOUTH ONCE DAILY (Patient taking differently: Take 100 mcg by mouth once daily.), Disp: 30 tablet, Rfl: 11    hyoscyamine (ANASPAZ,LEVSIN) 0.125 mg Tab, Take 125 mcg by mouth 2 (two) times a day., Disp: , Rfl:     latanoprost 0.005 % ophthalmic solution, Place 1 drop into both eyes every evening., Disp: , Rfl:     midodrine (PROAMATINE) 2.5 MG Tab, Take 1 tablet (2.5 mg total) by mouth 3 (three) times daily with meals., Disp: 90 tablet, Rfl: 3    multivitamin capsule, Take 1 capsule by mouth once daily., Disp: , Rfl:     nitroGLYCERIN (NITROSTAT) 0.4 MG SL tablet, Place 1 tablet (0.4 mg total) under the tongue every 5 (five) minutes as needed for Chest pain., Disp: 30 tablet, Rfl: 1    pantoprazole (PROTONIX) 40 MG tablet, Take 1 tablet (40 mg total) by mouth once daily., Disp: 90 tablet, Rfl: 3    QUEtiapine (SEROQUEL) 25 MG Tab, Take 1 tablet (25 mg total) by mouth before dinner., Disp: 30 tablet, Rfl: 3    traMADoL (ULTRAM) 50 mg tablet, TAKE 1 TABLET(50 MG) BY MOUTH EVERY 6 HOURS AS NEEDED FOR PAIN (Patient taking differently: Take 50 mg by mouth every  6 (six) hours as needed. TAKE 1 TABLET(50 MG) BY MOUTH EVERY 6 HOURS AS NEEDED FOR PAIN), Disp: 28 tablet, Rfl: 0      Scheduled Meds:  Continuous Infusions:  PRN Meds:.      Marika Hdez  [facility] Hospitalist  12/04/2022

## 2022-12-05 ENCOUNTER — PATIENT MESSAGE (OUTPATIENT)
Dept: FAMILY MEDICINE | Facility: CLINIC | Age: 82
End: 2022-12-05

## 2022-12-05 PROBLEM — U07.1 COVID-19: Status: ACTIVE | Noted: 2022-12-05

## 2022-12-05 LAB
ANION GAP SERPL CALC-SCNC: 7 MMOL/L (ref 8–16)
AORTIC ROOT ANNULUS: 3.33 CM
AORTIC VALVE CUSP SEPERATION: 2.07 CM
AV INDEX (PROSTH): 0.72
AV MEAN GRADIENT: 3 MMHG
AV PEAK GRADIENT: 4 MMHG
AV VALVE AREA: 2.69 CM2
AV VELOCITY RATIO: 0.7
BASOPHILS # BLD AUTO: 0.04 K/UL (ref 0–0.2)
BASOPHILS NFR BLD: 0.5 % (ref 0–1.9)
BSA FOR ECHO PROCEDURE: 1.67 M2
BUN SERPL-MCNC: 12 MG/DL (ref 8–23)
CALCIUM SERPL-MCNC: 9.5 MG/DL (ref 8.7–10.5)
CHLORIDE SERPL-SCNC: 102 MMOL/L (ref 95–110)
CO2 SERPL-SCNC: 24 MMOL/L (ref 23–29)
CREAT SERPL-MCNC: 0.8 MG/DL (ref 0.5–1.4)
CV ECHO LV RWT: 0.46 CM
DIFFERENTIAL METHOD: ABNORMAL
DOP CALC AO PEAK VEL: 1.06 M/S
DOP CALC AO VTI: 21.1 CM
DOP CALC LVOT AREA: 3.8 CM2
DOP CALC LVOT DIAMETER: 2.19 CM
DOP CALC LVOT PEAK VEL: 0.74 M/S
DOP CALC LVOT STROKE VOLUME: 56.85 CM3
DOP CALCLVOT PEAK VEL VTI: 15.1 CM
E WAVE DECELERATION TIME: 82.88 MSEC
E/A RATIO: 0.63
E/E' RATIO: 4.88 M/S
ECHO LV POSTERIOR WALL: 1.1 CM (ref 0.6–1.1)
EJECTION FRACTION: 58 %
EOSINOPHIL # BLD AUTO: 0 K/UL (ref 0–0.5)
EOSINOPHIL NFR BLD: 0.4 % (ref 0–8)
ERYTHROCYTE [DISTWIDTH] IN BLOOD BY AUTOMATED COUNT: 12.5 % (ref 11.5–14.5)
EST. GFR  (NO RACE VARIABLE): >60 ML/MIN/1.73 M^2
ESTIMATED AVG GLUCOSE: 117 MG/DL (ref 68–131)
FRACTIONAL SHORTENING: -14 % (ref 28–44)
GLUCOSE SERPL-MCNC: 106 MG/DL (ref 70–110)
HBA1C MFR BLD: 5.7 % (ref 4.5–6.2)
HCT VFR BLD AUTO: 35.7 % (ref 40–54)
HGB BLD-MCNC: 12 G/DL (ref 14–18)
IMM GRANULOCYTES # BLD AUTO: 0.02 K/UL (ref 0–0.04)
IMM GRANULOCYTES NFR BLD AUTO: 0.3 % (ref 0–0.5)
INTERVENTRICULAR SEPTUM: 1.12 CM (ref 0.6–1.1)
IVRT: 99.45 MSEC
LEFT ATRIUM SIZE: 3.53 CM
LEFT ATRIUM VOLUME INDEX MOD: 17.4 ML/M2
LEFT ATRIUM VOLUME MOD: 29.35 CM3
LEFT INTERNAL DIMENSION IN SYSTOLE: 5.52 CM (ref 2.1–4)
LEFT VENTRICLE DIASTOLIC VOLUME INDEX: 64.57 ML/M2
LEFT VENTRICLE DIASTOLIC VOLUME: 109.12 ML
LEFT VENTRICLE MASS INDEX: 117 G/M2
LEFT VENTRICLE SYSTOLIC VOLUME INDEX: 32.6 ML/M2
LEFT VENTRICLE SYSTOLIC VOLUME: 55.17 ML
LEFT VENTRICULAR INTERNAL DIMENSION IN DIASTOLE: 4.83 CM (ref 3.5–6)
LEFT VENTRICULAR MASS: 198.39 G
LV LATERAL E/E' RATIO: 4.33 M/S
LV SEPTAL E/E' RATIO: 5.57 M/S
LVOT MG: 1.16 MMHG
LVOT MV: 0.5 CM/S
LYMPHOCYTES # BLD AUTO: 0.5 K/UL (ref 1–4.8)
LYMPHOCYTES NFR BLD: 6.5 % (ref 18–48)
MAGNESIUM SERPL-MCNC: 1.9 MG/DL (ref 1.6–2.6)
MCH RBC QN AUTO: 32.6 PG (ref 27–31)
MCHC RBC AUTO-ENTMCNC: 33.6 G/DL (ref 32–36)
MCV RBC AUTO: 97 FL (ref 82–98)
MONOCYTES # BLD AUTO: 0.9 K/UL (ref 0.3–1)
MONOCYTES NFR BLD: 11.8 % (ref 4–15)
MV PEAK A VEL: 0.62 M/S
MV PEAK E VEL: 0.39 M/S
MV PEAK GRADIENT: 2 MMHG
MV STENOSIS PRESSURE HALF TIME: 24.03 MS
MV VALVE AREA P 1/2 METHOD: 9.16 CM2
NEUTROPHILS # BLD AUTO: 6.3 K/UL (ref 1.8–7.7)
NEUTROPHILS NFR BLD: 80.5 % (ref 38–73)
NRBC BLD-RTO: 0 /100 WBC
PLATELET # BLD AUTO: 125 K/UL (ref 150–450)
PMV BLD AUTO: 10.5 FL (ref 9.2–12.9)
POTASSIUM SERPL-SCNC: 3.4 MMOL/L (ref 3.5–5.1)
PV MV: 0.85 M/S
PV PEAK VELOCITY: 1.09 CM/S
RA PRESSURE: 3 MMHG
RBC # BLD AUTO: 3.68 M/UL (ref 4.6–6.2)
RV TISSUE DOPPLER FREE WALL SYSTOLIC VELOCITY 1 (APICAL 4 CHAMBER VIEW): 0.01 CM/S
SODIUM SERPL-SCNC: 133 MMOL/L (ref 136–145)
TDI LATERAL: 0.09 M/S
TDI SEPTAL: 0.07 M/S
TDI: 0.08 M/S
TRICUSPID ANNULAR PLANE SYSTOLIC EXCURSION: 2.28 CM
WBC # BLD AUTO: 7.81 K/UL (ref 3.9–12.7)

## 2022-12-05 PROCEDURE — 85025 COMPLETE CBC W/AUTO DIFF WBC: CPT | Performed by: INTERNAL MEDICINE

## 2022-12-05 PROCEDURE — 83735 ASSAY OF MAGNESIUM: CPT | Performed by: INTERNAL MEDICINE

## 2022-12-05 PROCEDURE — 36415 COLL VENOUS BLD VENIPUNCTURE: CPT | Performed by: INTERNAL MEDICINE

## 2022-12-05 PROCEDURE — 80048 BASIC METABOLIC PNL TOTAL CA: CPT | Performed by: INTERNAL MEDICINE

## 2022-12-05 PROCEDURE — 25000003 PHARM REV CODE 250: Performed by: INTERNAL MEDICINE

## 2022-12-05 PROCEDURE — 92610 EVALUATE SWALLOWING FUNCTION: CPT

## 2022-12-05 PROCEDURE — 63600175 PHARM REV CODE 636 W HCPCS: Performed by: INTERNAL MEDICINE

## 2022-12-05 PROCEDURE — 97166 OT EVAL MOD COMPLEX 45 MIN: CPT

## 2022-12-05 PROCEDURE — 96372 THER/PROPH/DIAG INJ SC/IM: CPT | Performed by: INTERNAL MEDICINE

## 2022-12-05 PROCEDURE — 83036 HEMOGLOBIN GLYCOSYLATED A1C: CPT | Performed by: INTERNAL MEDICINE

## 2022-12-05 PROCEDURE — 97530 THERAPEUTIC ACTIVITIES: CPT

## 2022-12-05 PROCEDURE — G0378 HOSPITAL OBSERVATION PER HR: HCPCS

## 2022-12-05 PROCEDURE — 97162 PT EVAL MOD COMPLEX 30 MIN: CPT

## 2022-12-05 RX ORDER — HALOPERIDOL 5 MG/ML
5 INJECTION INTRAMUSCULAR ONCE
Status: COMPLETED | OUTPATIENT
Start: 2022-12-05 | End: 2022-12-05

## 2022-12-05 RX ORDER — LANOLIN ALCOHOL/MO/W.PET/CERES
800 CREAM (GRAM) TOPICAL
Status: DISCONTINUED | OUTPATIENT
Start: 2022-12-05 | End: 2022-12-07 | Stop reason: HOSPADM

## 2022-12-05 RX ORDER — MIDODRINE HYDROCHLORIDE 2.5 MG/1
2.5 TABLET ORAL
Status: DISCONTINUED | OUTPATIENT
Start: 2022-12-05 | End: 2022-12-06

## 2022-12-05 RX ORDER — SODIUM,POTASSIUM PHOSPHATES 280-250MG
2 POWDER IN PACKET (EA) ORAL
Status: DISCONTINUED | OUTPATIENT
Start: 2022-12-05 | End: 2022-12-07 | Stop reason: HOSPADM

## 2022-12-05 RX ORDER — HALOPERIDOL 5 MG/1
5 TABLET ORAL ONCE
Status: DISCONTINUED | OUTPATIENT
Start: 2022-12-05 | End: 2022-12-05

## 2022-12-05 RX ORDER — ATORVASTATIN CALCIUM 40 MG/1
40 TABLET, FILM COATED ORAL NIGHTLY
Status: DISCONTINUED | OUTPATIENT
Start: 2022-12-05 | End: 2022-12-07 | Stop reason: HOSPADM

## 2022-12-05 RX ADMIN — POTASSIUM BICARBONATE 35 MEQ: 391 TABLET, EFFERVESCENT ORAL at 01:12

## 2022-12-05 RX ADMIN — ENOXAPARIN SODIUM 40 MG: 100 INJECTION SUBCUTANEOUS at 05:12

## 2022-12-05 RX ADMIN — HALOPERIDOL LACTATE 5 MG: 5 INJECTION, SOLUTION INTRAMUSCULAR at 03:12

## 2022-12-05 RX ADMIN — POTASSIUM BICARBONATE 35 MEQ: 391 TABLET, EFFERVESCENT ORAL at 10:12

## 2022-12-05 RX ADMIN — MIDODRINE HYDROCHLORIDE 2.5 MG: 2.5 TABLET ORAL at 05:12

## 2022-12-05 RX ADMIN — MIDODRINE HYDROCHLORIDE 2.5 MG: 2.5 TABLET ORAL at 10:12

## 2022-12-05 RX ADMIN — ATORVASTATIN CALCIUM 40 MG: 40 TABLET, FILM COATED ORAL at 09:12

## 2022-12-05 RX ADMIN — ESCITALOPRAM OXALATE 20 MG: 10 TABLET ORAL at 09:12

## 2022-12-05 RX ADMIN — QUETIAPINE 25 MG: 25 TABLET ORAL at 03:12

## 2022-12-05 RX ADMIN — FLUDROCORTISONE ACETATE 100 MCG: 0.1 TABLET ORAL at 09:12

## 2022-12-05 RX ADMIN — EZETIMIBE 10 MG: 10 TABLET ORAL at 09:12

## 2022-12-05 RX ADMIN — ASPIRIN 81 MG: 81 TABLET, COATED ORAL at 09:12

## 2022-12-05 RX ADMIN — Medication 9 MG: at 08:12

## 2022-12-05 NOTE — PLAN OF CARE
[facility]  Initial Discharge Assessment       Primary Care Provider: Jeyson Waldron MD    Admission Diagnosis: Altered mental status, unspecified altered mental status type [R41.82]    Admission Date: 12/4/2022  Expected Discharge Date: 12/6/2022    Due to isolation precaution,  completed discharge assessment with Pt's daughter ( Taylor Metcalf (Daughter)   [phone] (Mobile)) Demographics, PCP, and insurance verified with Pt's daughter. No home health. No dialysis. Pt's daughter reports Pt's ability to complete ADLs with the assistance of a raised toilet and a grab bars. Pt's verbalized plan to discharge home via family transport or to [facility] if admission process is complete before discharge. Pt has no other needs to be addressed at this time.    Discharge Barriers Identified: None    Payor: [employer] [facility] MCARE / Plan: [employer] MEDICARE ADVANTAGE / Product Type: Medicare Advantage /     Extended Emergency Contact Information  Primary Emergency Contact: Taylor Metcalf  Address: [address] [place] [address] [place] [address] [facility]  Home Phone: [phone]  Mobile Phone: [phone]  Relation: Daughter  Secondary Emergency Contact: Aliica Griffiths   [place]  Mobile Phone: [phone]  Relation: Daughter    Discharge Plan A: Home with family  Discharge Plan B: Assisted Living      [facility] - [address]  [address] [address]  Phone: [phone] Fax: [phone]    [id number] - [place] - [address] [facility] [address]  [address]  [place] [address]  Phone: [phone] Fax: [phone]    [facility]) - [place], [place] - [address]  [address]  [address]  [facility] [address]  Phone: [phone] Fax: [phone]      Initial  Assessment (most recent)       Adult Discharge Assessment - 12/05/22 1724          Discharge Assessment    Assessment Type Discharge Planning Assessment     Confirmed/corrected address, phone number and insurance Yes     Confirmed Demographics Correct on Facesheet     Source of Information family     If unable to respond/provide information was family/caregiver contacted? Yes     Contact Name/Number Taylor Metcalf (Daughter)   [phone] (Mobile)     Does patient/caregiver understand observation status Yes     Communicated GOMEZ with patient/caregiver Yes     Reason For Admission TIA (transient ischemic attack)     Lives With alone     Facility Arrived From: Home     Do you expect to return to your current living situation? No   Pt to discharge to [facility] or with daughter    Do you have help at home or someone to help you manage your care at home? Yes     Who are your caregiver(s) and their phone number(s)? Taylor Metcalf (Daughter)   [phone] (Mobile)     Prior to hospitilization cognitive status: Unable to Assess     Current cognitive status: Unable to Assess     Walking or Climbing Stairs Difficulty none     Dressing/Bathing Difficulty bathing difficulty, requires equipment     Dressing/Bathing Management Raised toilet, grab bar     Home Accessibility wheelchair accessible     Home Layout Able to live on 1st floor     Equipment Currently Used at Home raised toilet;grab bar     Readmission within 30 days? No     Patient currently being followed by outpatient case management? No     Do you currently have service(s) that help you manage your care at home? No     Is the pt/caregiver preference to resume services with current agency No     Do you take prescription medications? Yes     Do you have prescription coverage? Yes     Coverage Payor:  [facility] MEDICARE ADVANTAGE     Do you have any problems affording any of your prescribed medications? No     Is the  patient taking medications as prescribed? yes     Who is going to help you get home at discharge? Taylor Metcalf (Daughter)   [phone] (Mobile)     How do you get to doctors appointments? family or friend will provide     Are you on dialysis? No     Do you take coumadin? No     Discharge Plan A Home with family     Discharge Plan B Assisted Living     DME Needed Upon Discharge  none     Discharge Plan discussed with: Adult children     Discharge Barriers Identified None

## 2022-12-05 NOTE — ED PROVIDER NOTES
Encounter Date: 12/4/2022       History     Chief Complaint   Patient presents with    Altered Mental Status     Patient presents emergency department with reported altered mental status pain patient's daughter reports that she he had episode of slurred speech and right-sided facial drooping this morning was found to be altered last night patient does have a history of dementia but according to friends and family his behavior is worsened than baseline he personally denies any complaints but review of systems in HPI is limited to reports by family secondary to the dementia      Review of patient's allergies indicates:   Allergen Reactions    Hydrocodone      Past Medical History:   Diagnosis Date    AI (aortic insufficiency)     Coronary artery disease     GERD (gastroesophageal reflux disease)     Hyperlipidemia     Hypertension     LBBB (left bundle branch block)     Squamous cell carcinoma in situ (SCCIS) of scalp     SSS (sick sinus syndrome)      Past Surgical History:   Procedure Laterality Date    CARDIAC CATHETERIZATION      CATARACT EXTRACTION      CORONARY ANGIOPLASTY  08/29/82015    CORONARY STENT PLACEMENT  2015    Dr Parr    EPIDURAL STEROID INJECTION N/A 7/29/2022    Procedure: Injection, Steroid, Epidural;  Surgeon: Janes Woodson MD;  [facility]  Service: Pain Management;  Laterality: N/A;  L5-s1     FOOT SURGERY Right     KNEE SURGERY Left     right elbow       No family history on file.  Social History     Tobacco Use    Smoking status: Never    Smokeless tobacco: Never   Substance Use Topics    Alcohol use: Not Currently     Review of Systems   Unable to perform ROS: Mental status change     Physical Exam     Initial Vitals [12/04/22 0813]   BP Pulse Resp Temp SpO2   (!) 142/81 88 18 97.8 °F (36.6 °C) 99 %      MAP       --         Physical Exam    Constitutional: He appears well-developed and well-nourished. No distress.   HENT:   Head: Normocephalic and atraumatic.   Right Ear: External  ear normal.   Left Ear: External ear normal.   Mouth/Throat: Oropharynx is clear and moist.   Eyes: EOM are normal. Pupils are equal, round, and reactive to light.   Neck: Neck supple.   Normal range of motion.  Cardiovascular:  Normal rate, regular rhythm, S1 normal, S2 normal, normal heart sounds and intact distal pulses.           Pulmonary/Chest: Breath sounds normal. No respiratory distress. He has no rhonchi.   Abdominal: Abdomen is soft. Bowel sounds are normal. There is no abdominal tenderness.   Musculoskeletal:         General: Normal range of motion.      Cervical back: Normal range of motion and neck supple.     Neurological: He is alert. He has normal strength. No cranial nerve deficit. GCS score is 15. GCS eye subscore is 4. GCS verbal subscore is 5. GCS motor subscore is 6.   Skin: Skin is warm and dry. No rash noted.   Psychiatric: He has a normal mood and affect. His behavior is normal.       ED Course   Procedures  Labs Reviewed   CBC W/ AUTO DIFFERENTIAL - Abnormal; Notable for the following components:       Result Value    RBC 3.97 (*)     Hemoglobin 13.2 (*)     Hematocrit 39.5 (*)      (*)     MCH 33.2 (*)     Lymph # 0.4 (*)     Gran % 83.6 (*)     Lymph % 4.7 (*)     All other components within normal limits   COMPREHENSIVE METABOLIC PANEL - Abnormal; Notable for the following components:    Glucose 112 (*)     Total Bilirubin 1.5 (*)     Alkaline Phosphatase 54 (*)     Anion Gap 7 (*)     All other components within normal limits   PROTIME-INR - Abnormal; Notable for the following components:    PT 14.3 (*)     All other components within normal limits   B-TYPE NATRIURETIC PEPTIDE - Abnormal; Notable for the following components:     (*)     All other components within normal limits   URINALYSIS, REFLEX TO URINE CULTURE - Abnormal; Notable for the following components:    Specific Gravity, UA >1.030 (*)     Ketones, UA Trace (*)     All other components within normal limits     Narrative:     Specimen Source->Urine   SARS-COV-2 RNA AMPLIFICATION, QUAL - Abnormal; Notable for the following components:    SARS-CoV-2 RNA, Amplification, Qual Positive (*)     All other components within normal limits   TSH   LIPID PANEL   TROPONIN I HIGH SENSITIVITY   INFLUENZA A AND B ANTIGEN    Narrative:     Specimen Source->Nasopharyngeal Swab   POCT GLUCOSE   ISTAT CREATININE   POCT CREATININE        ECG Results              ECG 12 lead (In process)  Result time 12/04/22 09:46:33      In process by Interface, Lab In [facility] (12/04/22 09:46:33)                   Narrative:    Test Reason : I63.9,    Vent. Rate : 074 BPM     Atrial Rate : 074 BPM     P-R Int : 166 ms          QRS Dur : 144 ms      QT Int : 434 ms       P-R-T Axes : 081 050 085 degrees     QTc Int : 481 ms    Sinus rhythm with Premature atrial complexes  Left bundle branch block  Abnormal ECG  When compared with ECG of 18-JUL-2022 15:18,  Premature atrial complexes are now Present    Referred By: [email or web address]   SELF           Confirmed By:                                   Imaging Results              MRI Brain Without Contrast (Final result)  Result time 12/04/22 15:31:26      Final result by Ally Sheriff MD (12/04/22 15:31:26)                   Narrative:    MRI of the brain    Clinical history is TIA.    COMPARISON: Head CT at 8:39 AM    Multiplanar images of the brain were obtained.    The ventricles and sulci are mildly prominent commensurate with the patient's age.    There is no abnormality on the diffusion-weighted images to suggest acute infarct. There is mild increased FLAIR and T2 signal in the periventricular white matter compatible with small vessel disease.    There is no hemorrhage, mass or midline shift. There are no extra-axial fluid collections. There are flow voids within the cavernous portions of the internal carotid arteries and basilar artery indicating patency.    The corpus callosum, cerebellar tonsils, midline  structures are in orbits are normal.    IMPRESSION: Generalized relaxed with mild periventricular small vessel disease    No acute intracranial process    Electronically signed by:  Ally Sheriff MD  12/4/2022 3:31 PM CST Workstation: [id number]                                     X-Ray Chest AP Portable (Final result)  Result time 12/04/22 15:16:51      Final result by Ally Sheriff MD (12/04/22 15:16:51)                   Narrative:    XR CHEST 1 VIEW    CLINICAL HISTORY:  82 years Male ams    COMPARISON: 11/27/2017    FINDINGS: Cardiomediastinal silhouette is within normal limits. Lungs are normally expanded with no airspace consolidation. No pleural effusion or pneumothorax. No acute osseous abnormality.    IMPRESSION: No acute pulmonary process.    Electronically signed by:  Ally Sheriff MD  12/4/2022 3:16 PM CST Workstation: [id number]                                     CTA Head and Neck (xpd) (Final result)  Result time 12/04/22 09:11:41      Final result by Judson Ty MD (12/04/22 09:11:41)                   Narrative:    CMS MANDATED QUALITY DATA-CT RADIATION DOSE-436, CAROTID-195  All CT scans at this facility use dose modulation, iterative reconstruction, and or weight based dosing when appropriate, to reduce radiation dose to as low as reasonably achievable. NASCET criteria were utilized for evaluation of carotid arterial stenosis.    HISTORY: Neuro deficit, acute, stroke suspected    FINDINGS: Thin axial imaging through the head and neck was performed with 100 mL Omnipaque 350 IV contrast, with sagittal and coronal reformatted images and MIP reconstructions performed, and images stored in the patient's permanent electronic medical record. Comparison to prior exams.    CTA HEAD: The distal cervical, petrous, cavernous and supraclinoid segments of the internal carotid arteries are widely patent, with mild calcified plaque of the carotid siphons. The distal intracranial segments of  the vertebral arteries and basilar artery are widely patent.    The bilateral anterior, middle and posterior cerebral arteries are widely patent and taper appropriately, with no intracranial aneurysm or vascular malformation. The anterior communicating artery is patent. The visualized dural venous sinuses enhance normally. There is no enhancing intracranial mass.    CTA NECK:  Scattered calcified plaque involves the aortic arch, with the arch and arch vessel origins widely patent. Both visualized subclavian arteries are widely patent, with the common carotid arteries widely patent. There is mild calcified and soft plaque of the carotid bulbs, with the internal carotid arteries widely patent through the level of the skull base. The external carotid arteries and branches are patent.    The vertebral arteries arise normally from the subclavian arteries, with the right vertebral artery dominant. There is no arterial dissection or aneurysm.    The superior mediastinum and cervical soft tissues enhance normally. The visualized upper lungs show apical fibronodular scarring and are otherwise clear. There are no acute fractures or destructive osseous lesions, with intervertebral disc space narrowing in the spine.    IMPRESSION:  1. Mild atheromatous plaque of the carotid bulbs, with widely patent carotid and vertebral arteries.  2. No significant abnormality of the intracranial arterial vasculature.    Electronically signed by:  Judson Ty MD  12/4/2022 9:11 AM CST Workstation: [id number]                                     CT HEAD FOR STROKE (Final result)  Result time 12/04/22 09:06:14   Procedure changed from CT Head Without Contrast     Final result by Judson Ty MD (12/04/22 09:06:14)                   Narrative:    CMS MANDATED QUALITY DATA-CT RADIATION DOSE-436  All CT scans at this facility dose modulation, iterative reconstruction, and or weight-based dosing when appropriate to reduce radiation dose to as low  as reasonably achievable.    HISTORY: Neuro deficit, acute, stroke suspected    FINDINGS: Comparison to head CT of 12/20/2015. There is no acute intracranial hemorrhage, with no mass effect or abnormal extra-axial fluid. Scattered areas of nonspecific hypoattenuation involve the subcortical and deep periventricular white matter, with gray-white differentiation maintained.    There is stable generalized prominence of the cortical sulci and ventricles. The cerebellum and brainstem are unremarkable. There are carotid siphon vascular calcifications. The visualized paranasal sinuses and mastoid air cells are clear. There is no acute osseous abnormality.    IMPRESSION:  1. No acute intracranial hemorrhage, mass effect, or loss of gray-white differentiation.  2. Chronic microvascular ischemic changes and generalized cerebral atrophy.    Electronically signed by:  Judson Ty MD  12/4/2022 9:06 AM CST Workstation: [id number]                                     Medications   aspirin EC tablet 81 mg (81 mg Oral Given 12/5/22 0927)   EScitalopram oxalate tablet 20 mg (20 mg Oral Given 12/5/22 0929)   ezetimibe tablet 10 mg (10 mg Oral Given 12/5/22 0928)   fludrocortisone tablet 100 mcg (100 mcg Oral Given 12/5/22 0930)   pantoprazole EC tablet 40 mg (40 mg Oral Not Given 12/5/22 0600)   sodium chloride 0.9% flush 10 mL (has no administration in time range)   melatonin tablet 9 mg (9 mg Oral Given 12/4/22 2221)   ondansetron injection 4 mg (has no administration in time range)   prochlorperazine injection Soln 5 mg (has no administration in time range)   acetaminophen tablet 1,000 mg (has no administration in time range)   simethicone chewable tablet 80 mg (has no administration in time range)   aluminum-magnesium hydroxide-simethicone 200-200-20 mg/5 mL suspension 30 mL (has no administration in time range)   acetaminophen tablet 650 mg (has no administration in time range)   HYDROcodone-acetaminophen 5-325 mg per tablet 1  tablet (has no administration in time range)   morphine injection 4 mg (has no administration in time range)   naloxone 0.4 mg/mL injection 0.02 mg (has no administration in time range)   glucose chewable tablet 16 g (has no administration in time range)   glucose chewable tablet 24 g (has no administration in time range)   glucagon (human recombinant) injection 1 mg (has no administration in time range)   dextrose 10% bolus 125 mL (has no administration in time range)   dextrose 10% bolus 250 mL (has no administration in time range)   enoxaparin injection 40 mg (40 mg Subcutaneous Given 12/5/22 1757)   QUEtiapine tablet 25 mg (25 mg Oral Given 12/5/22 1540)   atorvastatin tablet 40 mg (has no administration in time range)   potassium bicarbonate disintegrating tablet 50 mEq (has no administration in time range)   potassium bicarbonate disintegrating tablet 35 mEq (35 mEq Oral Given 12/5/22 1334)   potassium bicarbonate disintegrating tablet 60 mEq (has no administration in time range)   magnesium oxide tablet 800 mg (has no administration in time range)   magnesium oxide tablet 800 mg (has no administration in time range)   potassium, sodium phosphates 280-160-250 mg packet 2 packet (has no administration in time range)   potassium, sodium phosphates 280-160-250 mg packet 2 packet (has no administration in time range)   potassium, sodium phosphates 280-160-250 mg packet 2 packet (has no administration in time range)   midodrine tablet 2.5 mg (2.5 mg Oral Given 12/5/22 1758)   iohexoL (OMNIPAQUE 350) injection 100 mL (100 mLs Intravenous Given 12/4/22 5688)   LORazepam injection 0.5 mg (0.5 mg Intravenous Given 12/4/22 6409)   haloperidol lactate injection 5 mg (5 mg Intramuscular Given 12/5/22 1540)     Medical Decision Making:   ED Management:  Laboratory evaluation reviewed CT scan of the head shows no acute abnormalities CTA reveals no evidence of large vessel occlusion I have discussed patient's findings with  [facility] who evaluate patient in the emergency department          Attending Attestation:         Attending Critical Care:   Critical Care Times:   Direct Patient Care (initial evaluation, reassessments, and time considering the case)................................................................15 minutes.   Ordering, Reviewing, and Interpreting Diagnostic Studies...............................................................................................................8 minutes.   Documentation..................................................................................................................................................................................10 minutes.   Consultation with other Physicians. .................................................................................................................................................8 minutes.   ==============================================================  Total Critical Care Time - exclusive of procedural time: 41 minutes.  ==============================================================                     Clinical Impression:   Final diagnoses:  [I63.9] Stroke  [R41.82] Altered mental status, unspecified altered mental status type (Primary)        ED Disposition Condition    Observation                 Home Hernandez MD  12/05/22 4680

## 2022-12-05 NOTE — PLAN OF CARE
Problem: Skin Injury Risk Increased  Goal: Skin Health and Integrity  Intervention: Promote and Optimize Oral Intake  Flowsheets (Taken 12/5/2022 1348)  Oral Nutrition Promotion: calorie-dense liquids provided     Problem: Oral Intake Inadequate  Goal: Improved Oral Intake  Outcome: Ongoing, Progressing  Intervention: Promote and Optimize Oral Intake  Flowsheets (Taken 12/5/2022 1348)  Oral Nutrition Promotion: calorie-dense liquids provided

## 2022-12-05 NOTE — PT/OT/SLP EVAL
Occupational Therapy   Evaluation    Name: Jarred Harrison  MRN: [id number]  Admitting Diagnosis: TIA (transient ischemic attack)  Recent Surgery: * No surgery found *      Recommendations:     Discharge Recommendations:  (SNF vs HHOT with 24/7 (A))  Discharge Equipment Recommendations:   (TBD)  Barriers to discharge:   (Medical Acuity)    Assessment:     Jarred Harrison is a 82 y.o. male with a medical diagnosis of TIA (transient ischemic attack).  He presents with confusion and weakness. Performance deficits affecting function: weakness, impaired endurance, impaired sensation, impaired self care skills, impaired functional mobility, gait instability, impaired balance, impaired cognition, decreased safety awareness, impaired cardiopulmonary response to activity.      Rehab Prognosis: Fair; patient would benefit from acute skilled OT services to address these deficits and reach maximum level of function.       Plan:     Patient to be seen 5 x/week to address the above listed problems via self-care/home management, therapeutic activities, therapeutic exercises  Plan of Care Expires: 01/05/23  Plan of Care Reviewed with: patient    Subjective     Chief Complaint: confusion and general weakness  Patient/Family Comments/goals: improved cognition, functional mobility and Adl independence.    Occupational Profile:  Living Environment: unable to determine, patient confused, no family present and no notes in the chart.    Pain/Comfort:  Pain Rating 1: 0/10  Pain Rating Post-Intervention 1: 0/10    Patients cultural, spiritual, Anabaptism conflicts given the current situation: no    Objective:     Communicated with: nurse prior to session.  Patient found HOB elevated with telemetry, peripheral IV upon OT entry to room.    General Precautions: Standard, fall, airborne, contact, droplet  Orthopedic Precautions: N/A  Braces: N/A  Respiratory Status: Room air    Occupational Performance:    Bed Mobility:    Patient completed  Scooting/Bridging with maximal assistance  Patient completed Supine to Sit with maximal assistance  Patient completed Sit to Supine with maximal assistance  Performed unsupported sitting EOB with minimal assistance.    Cognitive/Visual Perceptual:  Cognitive/Psychosocial Skills:     -       Oriented to: Person   -       Follows Commands/attention:intermittent alertness and intermittently following 1 step commands.  -       Communication: expressive aphasia  -       Memory: Poor immediate recall  -       Safety awareness/insight to disability: impaired   -       Mood/Affect/Coping skills/emotional control: Flat affect and Withdrawn  Visual/Perceptual:      -unable to assess secondary to confusion     Physical Exam:  Balance:    -       Sitting: Minimal Assistance  Upper Extremity Range of Motion:     -       Right Upper Extremity: WFL  -       Left Upper Extremity: WFL  Upper Extremity Strength:    -       Right Upper Extremity: 3/5  -       Left Upper Extremity: 3/5   Strength:    -       Right Upper Extremity: fair  -       Left Upper Extremity: fair  Fine Motor Coordination:    -       Intact    AMPAC 6 Click ADL:  AMPAC Total Score: 6    Treatment & Education:  Patient educated on the purpose of Occupational Therapy and the importance of getting OOB, but too confused to understand.    Patient left HOB elevated with all lines intact, call button in reach, and bed alarm on    GOALS:   Multidisciplinary Problems       Occupational Therapy Goals          Problem: Occupational Therapy    Goal Priority Disciplines Outcome Interventions   Occupational Therapy Goal     OT, PT/OT     Description: Goals to be met by: 1/5/2022     Patient will increase functional independence with ADLs by performing:    UE Dressing with Minimal Assistance.  LE Dressing with Minimal Assistance.  Grooming while seated with Minimal Assistance.  Toileting from bedside commode with Minimal Assistance for hygiene and clothing management.    Sitting at edge of bed x10 minutes with Supervision.  Supine to sit with Supervision.  Toilet transfer to bedside commode with Minimal Assistance.  Upper extremity exercise program x10 reps per handout, with assistance as needed.  Attend to ADL task for 15 minutes with minimal verbal/tactile cues.  Follow 1 step ADL task with minimal verbal/tactile cues.                         History:     Past Medical History:   Diagnosis Date    AI (aortic insufficiency)     Coronary artery disease     GERD (gastroesophageal reflux disease)     Hyperlipidemia     Hypertension     LBBB (left bundle branch block)     Squamous cell carcinoma in situ (SCCIS) of scalp     SSS (sick sinus syndrome)          Past Surgical History:   Procedure Laterality Date    CARDIAC CATHETERIZATION      CATARACT EXTRACTION      CORONARY ANGIOPLASTY  08/29/82015    CORONARY STENT PLACEMENT  2015    Dr aPrr    EPIDURAL STEROID INJECTION N/A 7/29/2022    Procedure: Injection, Steroid, Epidural;  Surgeon: Janes Woodson MD;  Location: [facility];  Service: Pain Management;  Laterality: N/A;  L5-s1     FOOT SURGERY Right     KNEE SURGERY Left     right elbow         Time Tracking:     OT Date of Treatment: 12/05/22  OT Start Time: 0920  OT Stop Time: 0935  OT Total Time (min): 15 min    Billable Minutes:Evaluation 2  Therapeutic Activity 13    12/5/2022

## 2022-12-05 NOTE — PT/OT/SLP EVAL
Speech Language Pathology Evaluation  Bedside Swallow    Patient Name:  Jarred Harrison   MRN:  [id number]  Admitting Diagnosis: TIA (transient ischemic attack)    Recommendations:                 General Recommendations:  Dysphagia therapy and Cognitive-linguistic therapy  Diet recommendations:  Soft & Bite Sized Diet - IDDSI Level 6, Thin   Aspiration Precautions: Standard aspiration precautions   General Precautions: Standard,    Communication strategies:  provide increased time to answer    History:   Jarred Harrison is a 82 y.o. White male with known history of dementia presented with 2 day history of confusion. Patient symptoms started with lethargy last night. This morning he woke up with confusion and right sided facial droop with slurred speech. Symptoms resolved by the time he presented to the ER. NIH zero on presentation. Head CT and CTA negative. Hospital medicine consulted for admission. During my evaluation, alert but pleasantly confused and not able to give me any reliable history.     Past Medical History:   Diagnosis Date    AI (aortic insufficiency)     Coronary artery disease     GERD (gastroesophageal reflux disease)     Hyperlipidemia     Hypertension     LBBB (left bundle branch block)     Squamous cell carcinoma in situ (SCCIS) of scalp     SSS (sick sinus syndrome)        Past Surgical History:   Procedure Laterality Date    CARDIAC CATHETERIZATION      CATARACT EXTRACTION      CORONARY ANGIOPLASTY  08/29/82015    CORONARY STENT PLACEMENT  2015    Dr Parr    EPIDURAL STEROID INJECTION N/A 7/29/2022    Procedure: Injection, Steroid, Epidural;  Surgeon: Janes Woodson MD;  Location: [facility];  Service: Pain Management;  Laterality: N/A;  L5-s1     FOOT SURGERY Right     KNEE SURGERY Left     right elbow           Prior Intubation HX:  N/A    Modified Barium Swallow: N/A    Chest X-Rays: No acute abnormality    MRI:  No acute processes    Prior diet: Presumed  Reg/Thin.    Occupation/hobbies/homemaking: N/A.    Subjective     Pt with low alertness and high fatigue.  Pt just awakened from sleep. Eyes remained closed throughout session with limited verbal responsiveness.  Note:  Breakfast tray (IDDSI-7 ) remained untouched.  Patient goals: Unable to elicit     Pain/Comfort:  Pain Rating 1: 0/10      Objective:     Oral Musculature Evaluation  Oral Musculature: unable to assess due to poor participation/comprehension  Dentition: present and adequate  Oral Musculature Comments: Limited OME 2 to pt.'s inability to follow commands, and overall alertness level    Bedside Swallow Eval:   Consistencies Assessed:  Thin liquids :  Isolated and consecutive sip, via cup and straw  Solids        Oral Phase:   Excess chewing  Prolonged mastication    Pharyngeal Phase:   no overt clinical signs/symptoms of aspiration  no overt clinical signs/symptoms of pharyngeal dysphagia    Compensatory Strategies  None        Assessment:   Limited clinical swallow evaluation 2 to pt.'s low alertness and limited ability to follow directives / answer questions related to swallowing hx.  Note:  Pt with hx of dementia.  Today's eval revealed a mild oral dysphagia, largely 2 to prolonged oral mastication time with textured-regular consistencies.  No overt s/s of pharyngeal dysphagia, including no aspiration/penetration.  Rec:  Diet modification to IDDSI-6 (Soft and cubed) / Thin liquid intake with swallowing precautions and SUP to promote feeding initiation, po acceptance, and nutritional gains.  SLP will cont to f/u for the goals listed below.    Goals:   Multidisciplinary Problems       SLP Goals          Problem: SLP    Goal Priority Disciplines Outcome   SLP Goal     SLP    Description: 1.  Pt will demonstrate no overt s/s of aspiration, > 95% of the time, with least restrictive diet.  2.  Pt will complete clinical lang/cog evaluation.  3.  Pt/CG will utilize trained swallowing precautions, with MOD  Ind.                       Plan:     Patient to be seen:  3 x/week   Plan of Care expires:     Plan of Care reviewed with:      SLP Follow-Up:  Yes       Discharge recommendations:      Barriers to Discharge:  None    Time Tracking:     SLP Treatment Date:   12/05/22  Speech Start Time:  1110  Speech Stop Time:  1125     Speech Total Time (min):  15 min    Billable Minutes: Eval 15 mins     12/05/2022

## 2022-12-05 NOTE — PT/OT/SLP EVAL
Physical Therapy Evaluation    Patient Name:  Jarred Harrison   MRN:  [id number]    Recommendations:     Discharge Recommendations: nursing facility, skilled, home health PT, other (see comments) (SNF vs HHPT w/24/7 assist pending progress)   Discharge Equipment Recommendations: other (see comments) (TBD)   Barriers to discharge:  unknown living situation/PLOF    Assessment:     Jarred Harrison is a 82 y.o. male admitted with a medical diagnosis of TIA (transient ischemic attack).  He presents with the following impairments/functional limitations: weakness, impaired endurance, impaired self care skills, impaired functional mobility, gait instability, impaired balance, impaired cognition, decreased upper extremity function, decreased lower extremity function, decreased safety awareness, impaired cardiopulmonary response to activity.    Pt keeps eyes closed throughout evaluation and speaking but not making sense and most words unintelligible.  Pt is poor historian and no family available to determine PLOF and severity of existing dementia. Pt mobilizes with maxA for bed mobility, unable to stand due to cognitive status and decreased participation.  Unable to determine current deficits are new or pre-existing.  Will need 24/7 care after dishcarge, SNF vs HHPT depending on progress and family involvement.    Rehab Prognosis: Fair; patient would benefit from acute skilled PT services to address these deficits and reach maximum level of function.    Recent Surgery: * No surgery found *      Plan:     During this hospitalization, patient to be seen 6 x/week to address the identified rehab impairments via gait training, therapeutic activities, therapeutic exercises, neuromuscular re-education and progress toward the following goals:    Plan of Care Expires:       Subjective     Chief Complaint: none  Patient/Family Comments/goals: none ststed  Pain/Comfort:  Pain Rating 1: 0/10    Patients cultural, spiritual, L[employer]-day  conflicts given the current situation:      Living Environment:  No information available  Prior to admission, patients level of function was unable to determine.  Equipment used at home: other (see comments) (unknown).  DME owned (not currently used):  [email or web address] .  Upon discharge, patient will have assistance from unable to determine.    Objective:     Communicated with RN prior to session.  Patient found HOB elevated with telemetry, peripheral IV, oxygen  upon PT entry to room.    General Precautions: Standard, fall, droplet, contact, airborne  Orthopedic Precautions:    Braces:    Respiratory Status: Nasal cannula, flow 2 L/min    Exams:  Cognitive Exam:  Patient is oriented to none  RLE ROM: keeps Les drawn up in fetal position, resists PROM  RLE Strength: unable to determine due to pt not following commands  LLE ROM: keeps Les drawn up in fetal position, resists PROM  LLE Strength: unable to determine due to pt not following commands    Functional Mobility:  Bed Mobility:     Rolling Left:  maximal assistance  Rolling Right: maximal assistance  Scooting: maximal assistance  Supine to Sit: maximal assistance  Sit to Supine: maximal assistance      AM-PAC 6 CLICK MOBILITY  Total Score:9       Treatment & Education:  Pt was educated on the following: call light use, importance of OOB activity and functional mobility to negate the negative effects of prolonged bed rest during this hospitalization, safe transfers/ambulation and discharge planning recommendations/options.     Patient left HOB elevated with all lines intact, call button in reach, bed alarm on, and RN notified.    GOALS:   Multidisciplinary Problems       Physical Therapy Goals          Problem: Physical Therapy    Goal Priority Disciplines Outcome Goal Variances Interventions   Physical Therapy Goal     PT, PT/OT      Description: All physical therapy goals to be met by discharge:    1. Supine to sit with Stand-by Assistance  2. Sit to stand transfer  with Stand-by Assistance  3.. Bed to chair transfer with Supervision using Rolling Walker  4. Gait  x 10 feet with Minimal Assistance using Rolling Walker.                          History:     Past Medical History:   Diagnosis Date    AI (aortic insufficiency)     Coronary artery disease     GERD (gastroesophageal reflux disease)     Hyperlipidemia     Hypertension     LBBB (left bundle branch block)     Squamous cell carcinoma in situ (SCCIS) of scalp     SSS (sick sinus syndrome)        Past Surgical History:   Procedure Laterality Date    CARDIAC CATHETERIZATION      CATARACT EXTRACTION      CORONARY ANGIOPLASTY  08/29/82015    CORONARY STENT PLACEMENT  2015    Dr Parr    EPIDURAL STEROID INJECTION N/A 7/29/2022    Procedure: Injection, Steroid, Epidural;  Surgeon: Janes Woodson MD;  Location: [facility] OR;  Service: Pain Management;  Laterality: N/A;  L5-s1     FOOT SURGERY Right     KNEE SURGERY Left     right elbow         Time Tracking:     PT Received On: 12/05/22  PT Start Time: 1044     PT Stop Time: 1100  PT Total Time (min): 16 min     Billable Minutes: Evaluation 8 and Therapeutic Activity 8      12/05/2022

## 2022-12-06 PROBLEM — G93.49 ENCEPHALOPATHY DUE TO COVID-19 VIRUS: Status: ACTIVE | Noted: 2022-12-05

## 2022-12-06 LAB
ANION GAP SERPL CALC-SCNC: 8 MMOL/L (ref 8–16)
BASOPHILS # BLD AUTO: 0.04 K/UL (ref 0–0.2)
BASOPHILS NFR BLD: 0.6 % (ref 0–1.9)
BUN SERPL-MCNC: 23 MG/DL (ref 8–23)
CALCIUM SERPL-MCNC: 9.6 MG/DL (ref 8.7–10.5)
CHLORIDE SERPL-SCNC: 100 MMOL/L (ref 95–110)
CO2 SERPL-SCNC: 27 MMOL/L (ref 23–29)
CREAT SERPL-MCNC: 1.2 MG/DL (ref 0.5–1.4)
DIFFERENTIAL METHOD: ABNORMAL
EOSINOPHIL # BLD AUTO: 0 K/UL (ref 0–0.5)
EOSINOPHIL NFR BLD: 0.3 % (ref 0–8)
ERYTHROCYTE [DISTWIDTH] IN BLOOD BY AUTOMATED COUNT: 12.2 % (ref 11.5–14.5)
EST. GFR  (NO RACE VARIABLE): 59.6 ML/MIN/1.73 M^2
GLUCOSE SERPL-MCNC: 119 MG/DL (ref 70–110)
HCT VFR BLD AUTO: 38.4 % (ref 40–54)
HGB BLD-MCNC: 13 G/DL (ref 14–18)
IMM GRANULOCYTES # BLD AUTO: 0.02 K/UL (ref 0–0.04)
IMM GRANULOCYTES NFR BLD AUTO: 0.3 % (ref 0–0.5)
LYMPHOCYTES # BLD AUTO: 0.9 K/UL (ref 1–4.8)
LYMPHOCYTES NFR BLD: 13.3 % (ref 18–48)
MAGNESIUM SERPL-MCNC: 2.1 MG/DL (ref 1.6–2.6)
MCH RBC QN AUTO: 32.4 PG (ref 27–31)
MCHC RBC AUTO-ENTMCNC: 33.9 G/DL (ref 32–36)
MCV RBC AUTO: 96 FL (ref 82–98)
MONOCYTES # BLD AUTO: 0.8 K/UL (ref 0.3–1)
MONOCYTES NFR BLD: 12.1 % (ref 4–15)
NEUTROPHILS # BLD AUTO: 5 K/UL (ref 1.8–7.7)
NEUTROPHILS NFR BLD: 73.4 % (ref 38–73)
NRBC BLD-RTO: 0 /100 WBC
PLATELET # BLD AUTO: 134 K/UL (ref 150–450)
PMV BLD AUTO: 10.1 FL (ref 9.2–12.9)
POTASSIUM SERPL-SCNC: 3.9 MMOL/L (ref 3.5–5.1)
RBC # BLD AUTO: 4.01 M/UL (ref 4.6–6.2)
SODIUM SERPL-SCNC: 135 MMOL/L (ref 136–145)
WBC # BLD AUTO: 6.75 K/UL (ref 3.9–12.7)

## 2022-12-06 PROCEDURE — 25000003 PHARM REV CODE 250: Performed by: INTERNAL MEDICINE

## 2022-12-06 PROCEDURE — 97530 THERAPEUTIC ACTIVITIES: CPT | Mod: CQ

## 2022-12-06 PROCEDURE — 21400001 HC TELEMETRY ROOM

## 2022-12-06 PROCEDURE — 63600175 PHARM REV CODE 636 W HCPCS: Performed by: INTERNAL MEDICINE

## 2022-12-06 PROCEDURE — 80048 BASIC METABOLIC PNL TOTAL CA: CPT | Performed by: INTERNAL MEDICINE

## 2022-12-06 PROCEDURE — 96372 THER/PROPH/DIAG INJ SC/IM: CPT | Performed by: INTERNAL MEDICINE

## 2022-12-06 PROCEDURE — 83735 ASSAY OF MAGNESIUM: CPT | Performed by: INTERNAL MEDICINE

## 2022-12-06 PROCEDURE — 97530 THERAPEUTIC ACTIVITIES: CPT

## 2022-12-06 PROCEDURE — 36415 COLL VENOUS BLD VENIPUNCTURE: CPT | Performed by: INTERNAL MEDICINE

## 2022-12-06 PROCEDURE — 85025 COMPLETE CBC W/AUTO DIFF WBC: CPT | Performed by: INTERNAL MEDICINE

## 2022-12-06 RX ORDER — TALC
9 POWDER (GRAM) TOPICAL NIGHTLY
Status: DISCONTINUED | OUTPATIENT
Start: 2022-12-06 | End: 2022-12-07 | Stop reason: HOSPADM

## 2022-12-06 RX ORDER — ESCITALOPRAM OXALATE 10 MG/1
10 TABLET ORAL DAILY
Start: 2022-12-06

## 2022-12-06 RX ADMIN — MELATONIN 9 MG: at 09:12

## 2022-12-06 RX ADMIN — ESCITALOPRAM OXALATE 20 MG: 10 TABLET ORAL at 09:12

## 2022-12-06 RX ADMIN — ATORVASTATIN CALCIUM 40 MG: 40 TABLET, FILM COATED ORAL at 09:12

## 2022-12-06 RX ADMIN — EZETIMIBE 10 MG: 10 TABLET ORAL at 09:12

## 2022-12-06 RX ADMIN — PANTOPRAZOLE SODIUM 40 MG: 40 TABLET, DELAYED RELEASE ORAL at 06:12

## 2022-12-06 RX ADMIN — QUETIAPINE 25 MG: 25 TABLET ORAL at 05:12

## 2022-12-06 RX ADMIN — ASPIRIN 81 MG: 81 TABLET, COATED ORAL at 09:12

## 2022-12-06 RX ADMIN — ENOXAPARIN SODIUM 40 MG: 100 INJECTION SUBCUTANEOUS at 05:12

## 2022-12-06 RX ADMIN — FLUDROCORTISONE ACETATE 100 MCG: 0.1 TABLET ORAL at 09:12

## 2022-12-06 NOTE — PLAN OF CARE
Problem: Occupational Therapy  Goal: Occupational Therapy Goal  Description: Goals to be met by: 1/5/2022     Patient will increase functional independence with ADLs by performing:    UE Dressing with Minimal Assistance.  LE Dressing with Minimal Assistance.  Grooming while seated with Minimal Assistance.  Toileting from bedside commode with Minimal Assistance for hygiene and clothing management.   Sitting at edge of bed x10 minutes with Supervision.  Supine to sit with Supervision.  Toilet transfer to bedside commode with Minimal Assistance.  Upper extremity exercise program x10 reps per handout, with assistance as needed.  Attend to ADL task for 15 minutes with minimal verbal/tactile cues.  Follow 1 step ADL task with minimal verbal/tactile cues.    Outcome: Ongoing, Progressing

## 2022-12-06 NOTE — NURSING
Patient in bed AA oriented to person only, on room air respirations even and unlabored, patient attempting to get OOB, he is repositioned at this time, and an attempt to orient patient of place and situation done at this time, placed on tele monitor at this time, safety precaution in place, side rails are up X2, bed low and locked, bedside table, and call light is within reach, bed alarm activated at this time, current plan of care in progress.

## 2022-12-06 NOTE — NURSING
Pt became agitated, cussing and trying to hit me, Pt kept trying to climb out of  bed.  Notified Dr. Gutiérrez, see orders

## 2022-12-06 NOTE — PT/OT/SLP PROGRESS
Physical Therapy Treatment    Patient Name:  Jarred Harrison   MRN:  [id number]    Recommendations:     Discharge Recommendations: nursing facility, skilled, home health PT, other (see comments) (SNF vs HHPT w/24/7 assist pending progress)  Discharge Equipment Recommendations: other (see comments) (TBD)  Barriers to discharge:  increased assist with mobility    Assessment:     Jarred Harrison is a 82 y.o. male admitted with a medical diagnosis of Encephalopathy, metabolic.  He presents with the following impairments/functional limitations: weakness, impaired endurance, impaired self care skills, impaired functional mobility, gait instability, impaired balance, impaired cognition, decreased upper extremity function, decreased lower extremity function, decreased safety awareness, impaired cardiopulmonary response to activity.  Pt agreeable to visit. Pt reports being sleepy. Pt began transferring supine to sit on his own with therapist verbal cuing pt to wait for assistance and providing CGA to complete transfer. Pt performed sit tos stand with initial CGA to stand but require min assist to maintain standing with HHA. Pt ambulated 5' with HHA and Daryl with small, shuffling steps. Pt stopped ambulating and began swaying side to side requiring modA to steady and to transfer back to sitting EOB. Pt reports feeling drowsy and sleeping. Pt returned to supine with CGA. Pt impulsive and hard of hearing requiring max verbal cuing for safety.    Rehab Prognosis: Fair; patient would benefit from acute skilled PT services to address these deficits and reach maximum level of function.    Recent Surgery: * No surgery found *      Plan:     During this hospitalization, patient to be seen 6 x/week to address the identified rehab impairments via gait training, therapeutic activities, therapeutic exercises, neuromuscular re-education and progress toward the following goals:    Plan of Care Expires:       Subjective     Chief Complaint:  feeling sleepy  Patient/Family Comments/goals: to rest  Pain/Comfort:  Pain Rating 1: 0/10      Objective:     Communicated with RN prior to session.  Patient found HOB elevated with telemetry, peripheral IV, bed alarm upon PT entry to room.     General Precautions: Standard, fall, droplet, contact, airborne  Orthopedic Precautions:    Braces:    Respiratory Status: Room air     Functional Mobility:  Bed Mobility:     Supine to Sit: contact guard assistance  Sit to Supine: contact guard assistance  Transfers:     Sit to Stand:  contact guard assistance and minimum assistance with hand-held assist  Gait: x 5' with Daryl HHA with small, shuffling steps. Pt stopped and began swaying side-to-side requiring modA to return to sitting EOB      AM-PAC 6 CLICK MOBILITY          Treatment & Education:  Pt educated on importance of time OOB, importance of intermittent mobility, safe techniques for transfers/ambulation, discharge recommendations/options, and use of call light for assistance and fall prevention.      Patient left HOB elevated with all lines intact, call button in reach, bed alarm on, and RN notified..    GOALS:   Multidisciplinary Problems       Physical Therapy Goals          Problem: Physical Therapy    Goal Priority Disciplines Outcome Goal Variances Interventions   Physical Therapy Goal     PT, PT/OT      Description: All physical therapy goals to be met by discharge:    1. Supine to sit with Stand-by Assistance  2. Sit to stand transfer with Stand-by Assistance  3.. Bed to chair transfer with Supervision using Rolling Walker  4. Gait  x 10 feet with Minimal Assistance using Rolling Walker.                          Time Tracking:     PT Received On: 12/06/22  PT Start Time: 0943     PT Stop Time: 0953  PT Total Time (min): 10 min     Billable Minutes: Therapeutic Activity 10    Treatment Type: Treatment  PT/PTA: PTA     PTA Visit Number: 1     12/06/2022

## 2022-12-06 NOTE — PROGRESS NOTES
Patient in bed AA, a few attempts to get OOB, but has been redirectable, bed alarm on, safety precautions in place.

## 2022-12-06 NOTE — PROGRESS NOTES
[facility] Medicine  Progress Note    Patient name: Jarred Harrison  MRN: [id number]  Admit Date: 12/4/2022   LOS: 0 days     SUBJECTIVE:     Principal problem: TIA (transient ischemic attack)    Interval History:  82 year old male with PMhx Dementia presented to the ED with 2 days of confusion beyond his baseline according to H&P in addition to lethargy, right facial droop and slurred speech.  He was admitted for CVA workup.  MRI brain negative for acute process suggesting possible TIA.  He has remain agitated and trying to get out of bed. Haldol x 1 administered to help calm him.  Neurology consult.  PT/OT/ST consult. Admission labs significant for covid positive. He has not been hypoxic.  CXR negative for infiltrate.     Hospital Course:    Scheduled Meds:   aspirin  81 mg Oral Daily    atorvastatin  40 mg Oral QHS    enoxaparin  40 mg Subcutaneous Daily    EScitalopram oxalate  20 mg Oral Daily    ezetimibe  10 mg Oral Daily    fludrocortisone  100 mcg Oral Daily    midodrine  2.5 mg Oral TID WM    pantoprazole  40 mg Oral Daily    QUEtiapine  25 mg Oral Before dinner     Continuous Infusions:  PRN Meds:acetaminophen, acetaminophen, aluminum-magnesium hydroxide-simethicone, dextrose 10%, dextrose 10%, glucagon (human recombinant), glucose, glucose, HYDROcodone-acetaminophen, magnesium oxide, magnesium oxide, melatonin, morphine, naloxone, ondansetron, potassium bicarbonate, potassium bicarbonate, potassium bicarbonate, potassium, sodium phosphates, potassium, sodium phosphates, potassium, sodium phosphates, prochlorperazine, simethicone, sodium chloride 0.9%    Review of patient's allergies indicates:   Allergen Reactions    Hydrocodone        Review of Systems: As per interval history    OBJECTIVE:     Vital Signs (Most Recent)  Temp: 97.7 °F (36.5 °C) (12/05/22 1944)  Pulse: 86 (12/05/22 1944)  Resp: 20 (12/05/22 1944)  BP: (!) 148/75 (12/05/22 1944)  SpO2: 96 % (12/05/22 1944)    Vital  Signs Range (Last 24H):  Temp:  [96.8 °F (36 °C)-100.9 °F (38.3 °C)]   Pulse:  [65-96]   Resp:  [20]   BP: (145-159)/(74-87)   SpO2:  [94 %-97 %]     I & O (Last 24H):  Intake/Output Summary (Last 24 hours) at 12/5/2022 2331  Last data filed at 12/5/2022 1230  Gross per 24 hour   Intake 120 ml   Output --   Net 120 ml       Physical Exam:    Vitals and nursing note reviewed.     Constitutional:       General: Not in acute distress.     Appearance: Well-developed.   HENT:      Head: Normocephalic and atraumatic.   Eyes:      Pupils: Pupils are equal, round, and reactive to light.   Cardiovascular:      Pulmonary:      Effort: Pulmonary effort is normal.       Abdominal:      General: There is no distension.      Musculoskeletal:         General: Normal range of motion.      Cervical back: Normal range of motion.   Skin:     Findings: No rash.   Neurological:      Mental Status: Asleep after receiving haldol.  Face appears symmetric.  Cannot participate in a full neuro exam.         Laboratory:  I have reviewed all pertinent lab results within the past 24 hours.  CBC:   Recent Labs   Lab 12/05/22  0533   WBC 7.81   RBC 3.68*   HGB 12.0*   HCT 35.7*   *   MCV 97   MCH 32.6*   MCHC 33.6     CMP:   Recent Labs   Lab 12/04/22  0835 12/05/22  0533   * 106   CALCIUM 10.1 9.5   ALBUMIN 4.7  --    PROT 8.0  --     133*   K 3.9 3.4*   CO2 27 24    102   BUN 16 12   CREATININE 0.9 0.8   ALKPHOS 54*  --    ALT 16  --    AST 26  --    BILITOT 1.5*  --        Diagnostic Results:      ASSESSMENT/PLAN:         Active Hospital Problems    Diagnosis  POA    *TIA (transient ischemic attack) [G45.9]  Yes    COVID-19 [U07.1]  Yes    Encephalopathy, metabolic [G93.41]  Yes    Dementia with behavioral disturbance [F03.918]  Unknown      Resolved Hospital Problems   No resolved problems to display.         Plan:     -Admitted for TIA/CVA workup.  MRI brain negative for acute ischemia. Asa and Statin.  -Neuro  checks  -Neurology consulted  -PT/OT/ST  -He has been quite confused throughout the day. Sitter ordered. Haldol x 1 ordered given repeated getting out of bed and agitation escalation.  -Not hypoxic. Symptomatic care for covid.  Isolation precautions.  -Replacing electrolytes prn  -DVT Px with lovenox        VTE Risk Mitigation (From admission, onward)           Ordered     enoxaparin injection 40 mg  Daily         12/04/22 1933     IP VTE HIGH RISK PATIENT  Once         12/04/22 1933     Place sequential compression device  Until discontinued         12/04/22 1933                      Department Hospital Medicine  [facility]  Dakota Gutiérrez MD  Date of service: 12/05/2022

## 2022-12-06 NOTE — PT/OT/SLP PROGRESS
Occupational Therapy   Treatment    Name: Jarred Harrison  MRN: [id number]  Admitting Diagnosis:  Encephalopathy, metabolic       Recommendations:     Discharge Recommendations:  (SNF vs HHOT with 24/7 (A))  Discharge Equipment Recommendations:   (TBD)  Barriers to discharge:   (Medical Acuity)    Assessment:     Jarred Harrison is a 82 y.o. male with a medical diagnosis of Encephalopathy, metabolic.  He presents with improving medical acuity. Patient more alert and following 1 step commands today. Participated in bed mobility, LB dressing sitting EOB and sit to stand x1 trial. Performance deficits affecting function are weakness, impaired endurance, impaired self care skills, impaired functional mobility, gait instability, impaired balance, impaired cognition, decreased safety awareness.     Rehab Prognosis:  Fair; patient would benefit from acute skilled OT services to address these deficits and reach maximum level of function.       Plan:     Patient to be seen 5 x/week to address the above listed problems via self-care/home management, therapeutic activities, therapeutic exercises  Plan of Care Expires: 01/05/23  Plan of Care Reviewed with: patient    Subjective     Pain/Comfort:  Pain Rating 1: 0/10  Pain Rating Post-Intervention 1: 0/10    Objective:     Communicated with: nurse prior to session.  Patient found HOB elevated with telemetry, peripheral IV upon OT entry to room.    General Precautions: Standard, fall, droplet, contact, airborne    Orthopedic Precautions:N/A  Braces: N/A  Respiratory Status: Room air     Occupational Performance:     Bed Mobility:    Patient completed Scooting/Bridging with minimum assistance  Patient completed Supine to Sit with minimum assistance  Patient completed Sit to Supine with minimum assistance   Performed unsupported sitting EOB with contact guard assistance.    Functional Mobility/Transfers:  Patient completed Sit <> Stand x1 Trial with minimum assistance using hand-held  assist     Activities of Daily Living:  Lower Body Dressing: moderate assistance to don/doff socks sitting EOB.      [facility] 6 Click ADL: 15    Treatment & Education:  Patient more alert and following 1 step commands throughout session.     Patient left HOB elevated with all lines intact, call button in reach, and bed alarm on    GOALS:   Multidisciplinary Problems       Occupational Therapy Goals          Problem: Occupational Therapy    Goal Priority Disciplines Outcome Interventions   Occupational Therapy Goal     OT, PT/OT Ongoing, Progressing    Description: Goals to be met by: 1/5/2022     Patient will increase functional independence with ADLs by performing:    UE Dressing with Minimal Assistance.  LE Dressing with Minimal Assistance.  Grooming while seated with Minimal Assistance.  Toileting from bedside commode with Minimal Assistance for hygiene and clothing management.   Sitting at edge of bed x10 minutes with Supervision.  Supine to sit with Supervision.  Toilet transfer to bedside commode with Minimal Assistance.  Upper extremity exercise program x10 reps per handout, with assistance as needed.  Attend to ADL task for 15 minutes with minimal verbal/tactile cues.  Follow 1 step ADL task with minimal verbal/tactile cues.                         Time Tracking:     OT Date of Treatment: 12/06/22  OT Start Time: 1015  OT Stop Time: 1029  OT Total Time (min): 14 min    Billable Minutes:Therapeutic Activity 14    OT/MARCO: OT          12/6/2022

## 2022-12-06 NOTE — PLAN OF CARE
called Pt's daughter (Taylor Metcalf (Daughter) [phone] (Mobile)) to complete SALES. Pt's daughter was explained SALES. Pt's daughter verbalized understanding of SALES.  and CHAI Haro witnessed verbal acknowledgement and signed SALES. SALES scanned to .       12/05/22 1220   SALES Message   Medicare Outpatient and Observation Notification regarding financial responsibility Given to patient/caregiver;Explained to patient/caregiver;Other (comments)  (Verbal obtained from Daughter (Taylor Metcalf (Daughter)   [phone] (Mobile)))   Date SALES was signed 12/05/22   Time SALES was signed [id number]

## 2022-12-06 NOTE — CONSULTS
[facility]  Neurology Consult    Patient Name: Jarred Harrison   MRN: [id number]  : 1940  TODAY'S DATE: 2022  ADMIT DATE: 2022  8:24 AM                                          CONSULTED PROVIDER: Kristy Gamez MD, Neurologist. Cellphone: [phone]  CONSULT REQUESTED BY: Damon Crespo MD     Chief Complaint   Patient presents with    Altered Mental Status        HPI per EMR:  82 year old male with PMhx Dementia presented to the ED with 2 days of confusion beyond his baseline according to H&P in addition to lethargy, right facial droop and slurred speech.  He was admitted for CVA workup.  MRI brain negative for acute process suggesting possible TIA.  He has remain agitated and trying to get out of bed. Haldol x 1 administered to help calm him.  Neurology consult.  PT/OT/ST consult. Admission labs significant for covid positive. He has not been hypoxic.  CXR negative for infiltrate.     Neurology Consult:  Patient was seen and examined by me today.  He is an 82-year-old man with history of dementia who presented with 2 days of confusion, lethargy, right facial droop and slurred speech concerning for stroke.  However, MRI brain was negative on admission.  He tested positive for COVID.  He has been altered for a few days, but mental status seems to be now improving.  Patient denies any focal weakness, numbness, dizziness, vision loss.    Review of Systems:    A comprehensive ROS was performed and all systems were negative except as noted above in HPI.    Past Medical History:  has a past medical history of AI (aortic insufficiency), Coronary artery disease, GERD (gastroesophageal reflux disease), Hyperlipidemia, Hypertension, LBBB (left bundle branch block), Squamous cell carcinoma in situ (SCCIS) of scalp, and SSS (sick sinus syndrome).    Past Surgical History:  has a past surgical history that includes Coronary angioplasty (); right elbow; Knee surgery (Left);  Cataract extraction; Foot surgery (Right); Cardiac catheterization; Coronary stent placement (2015); and Epidural steroid injection (N/A, 7/29/2022).    Family Medical History: family history is not on file.    Social History:  reports that he has never smoked. He has never used smokeless tobacco. He reports that he does not currently use alcohol.    PCP: Jeyson Waldron MD    Allergies:   Review of patient's allergies indicates:   Allergen Reactions    Hydrocodone        Medications:   No current facility-administered medications on file prior to encounter.     Current Outpatient Medications on File Prior to Encounter   Medication Sig Dispense Refill    aspirin (ECOTRIN) 81 MG EC tablet Take 81 mg by mouth once daily.      EScitalopram oxalate (LEXAPRO) 20 MG tablet Take 1 tablet (20 mg total) by mouth once daily. (Patient taking differently: Take 10 mg by mouth once daily.) 90 tablet 3    ezetimibe (ZETIA) 10 mg tablet Take 1 tablet (10 mg total) by mouth once daily. 90 tablet 3    fludrocortisone (FLORINEF) 0.1 mg Tab TAKE 1 TABLET BY MOUTH ONCE DAILY (Patient taking differently: Take 100 mcg by mouth once daily.) 30 tablet 11    hyoscyamine (ANASPAZ,LEVSIN) 0.125 mg Tab Take 125 mcg by mouth every 6 (six) hours as needed.      latanoprost 0.005 % ophthalmic solution Place 1 drop into both eyes every evening.      midodrine (PROAMATINE) 2.5 MG Tab Take 1 tablet (2.5 mg total) by mouth 3 (three) times daily with meals. 90 tablet 3    multivitamin capsule Take 1 capsule by mouth once daily.      nitroGLYCERIN (NITROSTAT) 0.4 MG SL tablet Place 1 tablet (0.4 mg total) under the tongue every 5 (five) minutes as needed for Chest pain. 30 tablet 1    pantoprazole (PROTONIX) 40 MG tablet Take 1 tablet (40 mg total) by mouth once daily. 90 tablet 3    QUEtiapine (SEROQUEL) 25 MG Tab Take 1 tablet (25 mg total) by mouth before dinner. 30 tablet 3       Physical Exam  Current Vitals:  Vitals:    12/06/22 0708   BP: (!)  144/77   Pulse: 70   Resp: 16   Temp: 97.9 °F (36.6 °C)       Physical Exam:  General: AO x3  HEENT: PERRL, EOMI  CV: RRR  Lungs:  No respiratory distress, room air  Abdomen: soft    Neurological Exam    MENTAL STATUS EXAM:  Patient is awake, alert, oriented to self, place, month and year but not to date.  He is unable to tell me why he came to the hospital.  However, he is able to follow commands consistently and answer questions.  He does seem tangential in conversation, mildly confused about the reason for his admission.  As per nursing, this has improved from yesterday.    CRANIAL NERVE EXAM:  II/III: fundoscopic exam deferred, PERRL; visual fields full to threat  III/IV/VI: EOMI w/out evidence of nystagmus, strabismus, or evoked diplopia  V: no deficits to light touch  VII: no facial asymmetry noted  VIII:  Decreased hearing  IX/X: palate @ ML and raises symmetrically  XI: shoulder shrug 5/5 bilaterally  XII: tongue to midline w/out asymmetry  .    MOTOR EXAM:  Bulk and Tone: normal throughout  Strength is 5/5 proximally and distally in upper and lower extremities without deficits.  No pronator drift in bilateral UE. No tremor either at rest or with intention.    REFLEXES:  1+ throughout.    SENSORY EXAM  Light touch intact throughout    COORDINATION/CEREBELLAR EXAM:  FTN: no dysmetria or other signs of appendicular ataxia  HTS:  Not tested    GAIT:  Deferred for safety.      Laboratory Data & Studies    Recent Labs   Lab 12/04/22 0835 12/05/22  0533   WBC 8.55 7.81   HGB 13.2* 12.0*    125*   * 97       Recent Labs   Lab 12/04/22 0835 12/05/22  0533    133*   K 3.9 3.4*    102   CO2 27 24   BUN 16 12   * 106   CALCIUM 10.1 9.5   MG  --  1.9       Recent Labs   Lab 12/04/22 0835   PROT 8.0   ALBUMIN 4.7   BILITOT 1.5*   AST 26   ALT 16   ALKPHOS 54*       Recent Labs   Lab 12/04/22 0835   LABPT 14.3*   INR 1.2       Recent Labs   Lab 12/04/22 0835 12/05/22  0533   HGBA1C   --  5.7   CHOL 155  --    TRIG 45  --    LDLCALC 91.0  --    HDL 55  --    TSH 2.470  --        Microbiology:  Microbiology Results (last 7 days)       ** No results found for the last 168 hours. **            Imaging:  MRI Brain Without Contrast    Result Date: 12/4/2022  MRI of the brain Clinical history is TIA. COMPARISON: Head CT at 8:39 AM Multiplanar images of the brain were obtained. The ventricles and sulci are mildly prominent commensurate with the patient's age. There is no abnormality on the diffusion-weighted images to suggest acute infarct. There is mild increased FLAIR and T2 signal in the periventricular white matter compatible with small vessel disease. There is no hemorrhage, mass or midline shift. There are no extra-axial fluid collections. There are flow voids within the cavernous portions of the internal carotid arteries and basilar artery indicating patency. The corpus callosum, cerebellar tonsils, midline structures are in orbits are normal. IMPRESSION: Generalized relaxed with mild periventricular small vessel disease No acute intracranial process Electronically signed by:  Ally Sheriff MD  12/4/2022 3:31 PM CST Workstation: [id number]    X-Ray Chest AP Portable    Result Date: 12/4/2022  XR CHEST 1 VIEW CLINICAL HISTORY: 82 years Male ams COMPARISON: 11/27/2017 FINDINGS: Cardiomediastinal silhouette is within normal limits. Lungs are normally expanded with no airspace consolidation. No pleural effusion or pneumothorax. No acute osseous abnormality. IMPRESSION: No acute pulmonary process. Electronically signed by:  Ally Sheriff MD  12/4/2022 3:16 PM CST Workstation: [id number]    CT HEAD FOR STROKE    Result Date: 12/4/2022  CMS MANDATED QUALITY DATA-CT RADIATION DOSE-436 All CT scans at this facility dose modulation, iterative reconstruction, and or weight-based dosing when appropriate to reduce radiation dose to as low as reasonably achievable. HISTORY: Neuro deficit, acute,  stroke suspected FINDINGS: Comparison to head CT of 12/20/2015. There is no acute intracranial hemorrhage, with no mass effect or abnormal extra-axial fluid. Scattered areas of nonspecific hypoattenuation involve the subcortical and deep periventricular white matter, with gray-white differentiation maintained. There is stable generalized prominence of the cortical sulci and ventricles. The cerebellum and brainstem are unremarkable. There are carotid siphon vascular calcifications. The visualized paranasal sinuses and mastoid air cells are clear. There is no acute osseous abnormality. IMPRESSION: 1. No acute intracranial hemorrhage, mass effect, or loss of gray-white differentiation. 2. Chronic microvascular ischemic changes and generalized cerebral atrophy. Electronically signed by:  Judson Ty MD  12/4/2022 9:06 AM CST Workstation: [id number]    Echo Saline Bubble? Yes    Result Date: 12/5/2022  · There is no evidence of intracardiac shunting. · The left ventricle is normal in size with mild concentric hypertrophy and normal systolic function. · The estimated ejection fraction is 58%. · Normal left ventricular diastolic function. · Normal right ventricular size with normal right ventricular systolic function. · Mild mitral regurgitation. · Normal central venous pressure (3 mmHg).      CTA Head and Neck (xpd)    Result Date: 12/4/2022  CMS MANDATED QUALITY DATA-CT RADIATION DOSE-436, CAROTID-195 All CT scans at this facility use dose modulation, iterative reconstruction, and or weight based dosing when appropriate, to reduce radiation dose to as low as reasonably achievable. NASCET criteria were utilized for evaluation of carotid arterial stenosis. HISTORY: Neuro deficit, acute, stroke suspected FINDINGS: Thin axial imaging through the head and neck was performed with 100 mL Omnipaque 350 IV contrast, with sagittal and coronal reformatted images and MIP reconstructions performed, and images stored in the patient's  permanent electronic medical record. Comparison to prior exams. CTA HEAD: The distal cervical, petrous, cavernous and supraclinoid segments of the internal carotid arteries are widely patent, with mild calcified plaque of the carotid siphons. The distal intracranial segments of the vertebral arteries and basilar artery are widely patent. The bilateral anterior, middle and posterior cerebral arteries are widely patent and taper appropriately, with no intracranial aneurysm or vascular malformation. The anterior communicating artery is patent. The visualized dural venous sinuses enhance normally. There is no enhancing intracranial mass. CTA NECK:  Scattered calcified plaque involves the aortic arch, with the arch and arch vessel origins widely patent. Both visualized subclavian arteries are widely patent, with the common carotid arteries widely patent. There is mild calcified and soft plaque of the carotid bulbs, with the internal carotid arteries widely patent through the level of the skull base. The external carotid arteries and branches are patent. The vertebral arteries arise normally from the subclavian arteries, with the right vertebral artery dominant. There is no arterial dissection or aneurysm. The superior mediastinum and cervical soft tissues enhance normally. The visualized upper lungs show apical fibronodular scarring and are otherwise clear. There are no acute fractures or destructive osseous lesions, with intervertebral disc space narrowing in the spine. IMPRESSION: 1. Mild atheromatous plaque of the carotid bulbs, with widely patent carotid and vertebral arteries. 2. No significant abnormality of the intracranial arterial vasculature. Electronically signed by:  Judson Ty MD  12/4/2022 9:11 AM [facility] Workstation: [id number]      Assessment and Plan:    Encephalopathy  COVID-19 positive  Slurred speech, right facial droop - concern for TIA  82-year-old man with history of dementia who presented with 2  days of confusion, lethargy, right facial droop and slurred speech concerning for stroke.  However, MRI brain was negative on admission.  He tested positive for COVID.  He has been altered for a few days, but mental status seems to be now improving.  Neurological examination is nonfocal.  Admitted for workup and management of encephalopathy and COVID-19 infection.  MRI brain was negative for acute stroke.  TIA in the differential but less likely.  This most likely infectious encephalopathy.  - Admitted to hospital medicine with q4 hour neuro checks, on telemetry, continuous pulse oximetry  - Diet after eval per SLP.  - Secondary stroke prevention with ASA 81 mg daily, atorvastatin 40 mg daily  - if mental status worsens or fails to improve, consider ordering EEG  - CTA head and neck showed no large vessel occlusion or significant stenosis  - Risk factor stratification with HgbA1C, Fasting Lipid profile, TSH  - 2D echocardiogram showed normal left ventricular systolic function, EF 58%, no PFO  - Maintain Euthermia with Tylenol prn temp > 37.2 degrees C.  - Assessment for rehab with PT/OT/SLP evaluation and treatment.  - Permissive HTN systolic BP goal up to 220, diastolic up to 110 for 24H, then lower BP slowly to normotension  - Cardiac enzymes and EKG   - Will follow peripherally. Please call with questions or concerns.      DVT prophylaxis with chemo/SCD prophylaxis  Extensively discussed lifestyle modifications as prophylactic measures for stroke prevention including smoking cessation, adequate blood pressure management, healthy diet and regular exercise.    Patient to follow up with [facility] at [phone] within 3 days from discharge.     Stroke education was provided including stroke risk factors modification and any acute neurological changes including weakness, confusion, visual changes to come straight to the ER.     All questions were answered.                              Thank you kindly  for including us in the care of this patient. Please do not hesitate to contact us with any questions.    61 minutes of care time has been spent evaluating with the patient. Time includes chart review not limited to diagnostic imaging, labs, and vitals, patient assessment, discussion with family and nursing, current order evaluations, and new order entries.      Kristy Gamez MD  Neurology

## 2022-12-06 NOTE — PROGRESS NOTES
Patient removed tele monitor and has stripped down naked in bed, unable to redirect patient, he is covered with blankets, family present at bedside.

## 2022-12-06 NOTE — PLAN OF CARE
Problem: Adult Inpatient Plan of Care  Goal: Plan of Care Review  Outcome: Ongoing, Not Progressing  Goal: Patient-Specific Goal (Individualized)  Outcome: Ongoing, Not Progressing  Goal: Absence of Hospital-Acquired Illness or Injury  Outcome: Ongoing, Not Progressing  Goal: Optimal Comfort and Wellbeing  Outcome: Ongoing, Not Progressing  Goal: Readiness for Transition of Care  Outcome: Ongoing, Not Progressing     Problem: Fall Injury Risk  Goal: Absence of Fall and Fall-Related Injury  Outcome: Ongoing, Not Progressing     Problem: Skin Injury Risk Increased  Goal: Skin Health and Integrity  Outcome: Ongoing, Not Progressing     Problem: Oral Intake Inadequate  Goal: Improved Oral Intake  Outcome: Ongoing, Not Progressing

## 2022-12-06 NOTE — CARE UPDATE
Per MD patient daughters refused HH and SNF.      Discharge put off until tomorrow 12/7/22.  Daughter that lives in [facility] has covid so, the patient daughter that lives in [place] is driving to [place] to care for the patient.

## 2022-12-06 NOTE — PLAN OF CARE
1218 -  received call from Pt's daughter (Taylor Metcalf (Daughter) [phone] ([place])) informing  Pt's daughter is en route to hospital from [place] but will not arrive until tomorrow, 12/7. Pt's daughter also informed  Pt's other daughter is unavailable due to COVID and a recent neck surgery.  informed current /discharge planner.

## 2022-12-06 NOTE — PROGRESS NOTES
[facility]  Progress Note    Patient name: Jarred Harrison  MRN: [id number]  Admit Date: 12/4/2022   LOS: 0 days     SUBJECTIVE:     Principal problem: Encephalopathy due to COVID-19 virus    Interval History:  No acute overnight events reported.  More alert and interactive today.  He is able to state his name and is aware that he is at [facility].  Discussed with daughter Taylor who reports that patient occasionally has dry mouth and can have slurred speech in this setting.  This is happened in the past; resolved spontaneously and was confused with CVA.      Hospital course:   82 year old male with known dementia presented to the ED with 2 days of confusion beyond his baseline according to H&P in addition to lethargy, right facial droop and slurred speech.  He was admitted for CVA workup.  MRI brain negative for acute process suggesting possible TIA.  He has remain agitated and trying to get out of bed.  This improved with haloperidol.  Neurology consulted.  PT/OT/ST consult. Admission labs positive for SARS-COV-2. He has not been hypoxic.  CXR negative for infiltrate.     Hospital Course:    Scheduled Meds:   aspirin  81 mg Oral Daily    atorvastatin  40 mg Oral QHS    enoxaparin  40 mg Subcutaneous Daily    EScitalopram oxalate  20 mg Oral Daily    ezetimibe  10 mg Oral Daily    fludrocortisone  100 mcg Oral Daily    pantoprazole  40 mg Oral Daily    QUEtiapine  25 mg Oral Before dinner     Continuous Infusions:  PRN Meds:acetaminophen, acetaminophen, aluminum-magnesium hydroxide-simethicone, dextrose 10%, dextrose 10%, glucagon (human recombinant), glucose, glucose, HYDROcodone-acetaminophen, magnesium oxide, magnesium oxide, melatonin, morphine, naloxone, ondansetron, potassium bicarbonate, potassium bicarbonate, potassium bicarbonate, potassium, sodium phosphates, potassium, sodium phosphates, potassium, sodium phosphates, prochlorperazine, simethicone, sodium  chloride 0.9%    Review of patient's allergies indicates:   Allergen Reactions    Hydrocodone        Review of Systems: As per interval history    OBJECTIVE:     Vital Signs (Most Recent)  Temp: 97.3 °F (36.3 °C) (12/06/22 1500)  Pulse: 77 (12/06/22 1500)  Resp: 16 (12/06/22 1500)  BP: 139/67 (12/06/22 1500)  SpO2: 95 % (12/06/22 1500)    Vital Signs Range (Last 24H):  Temp:  [97.1 °F (36.2 °C)-98.3 °F (36.8 °C)]   Pulse:  [64-86]   Resp:  [16-20]   BP: ()/(54-77)   SpO2:  [95 %-97 %]     I & O (Last 24H):  Intake/Output Summary (Last 24 hours) at 12/6/2022 1530  Last data filed at 12/6/2022 1500  Gross per 24 hour   Intake 360 ml   Output 575 ml   Net -215 ml       Physical Exam:    Vitals and nursing note reviewed.     Constitutional:       General: Not in acute distress.     Appearance: Well-developed.   HENT:      Head: Normocephalic and atraumatic.   Eyes:      Pupils: Pupils are equal, round, and reactive to light.   Cardiovascular:      Pulmonary:      Effort: Pulmonary effort is normal.  No tachypnea or accessory muscle use      Abdominal:      General: There is no distension.      Musculoskeletal:         General: Normal range of motion.      Cervical back: Normal range of motion.   Skin:     Findings: No rash.   Neurological:      Mental Status:  Alert to self and place.  Follows commands.         Laboratory:  I have reviewed all pertinent lab results within the past 24 hours.  CBC:   Recent Labs   Lab 12/06/22  1128   WBC 6.75   RBC 4.01*   HGB 13.0*   HCT 38.4*   *   MCV 96   MCH 32.4*   MCHC 33.9     CMP:   Recent Labs   Lab 12/04/22  0835 12/05/22  0533 12/06/22  1128   *   < > 119*   CALCIUM 10.1   < > 9.6   ALBUMIN 4.7  --   --    PROT 8.0  --   --       < > 135*   K 3.9   < > 3.9   CO2 27   < > 27      < > 100   BUN 16   < > 23   CREATININE 0.9   < > 1.2   ALKPHOS 54*  --   --    ALT 16  --   --    AST 26  --   --    BILITOT 1.5*  --   --     < > = values in this  interval not displayed.       Diagnostic Results:      ASSESSMENT/PLAN:         Active Hospital Problems    Diagnosis  POA    *Encephalopathy due to COVID-19 virus [U07.1, G93.49]  Yes    TIA (transient ischemic attack) [G45.9]  Yes    Encephalopathy, metabolic [G93.41]  Yes    Dementia with behavioral disturbance [F03.918]  Yes      Resolved Hospital Problems   No resolved problems to display.         Plan:     -Admitted for TIA/CVA workup.  MRI brain negative for acute ischemia  Continue aspirin and statin   Suspect symptoms likely secondary to encephalopathy in the setting of COVID-19 rather than CVA.  Continue conservative management   -Neurology consulted  -PT/OT/ST  Dementia complicated by hospital-acquired delirium.  Use haloperidol cautiously  Continue nightly melatonin; will change to scheduled dosing  Continue home quetiapine  -Not hypoxic. Symptomatic care for covid.  Isolation precautions.  -Replacing electrolytes prn  -DVT Px with lovenox        VTE Risk Mitigation (From admission, onward)           Ordered     enoxaparin injection 40 mg  Daily         12/04/22 1933     IP VTE HIGH RISK PATIENT  Once         12/04/22 1933     Place sequential compression device  Until discontinued         12/04/22 1933                      Department Hospital Medicine  [facility]  Damon Crespo MD  Date of service: 12/06/2022

## 2022-12-06 NOTE — CARE UPDATE
Supervisor notified via email that this patient needs a discharge f/u appointment with [facility] pcp CHELLE Waldron.

## 2022-12-06 NOTE — PLAN OF CARE
Patient cleared to discharge by case management. Patient discharging home self care no needs.       12/06/22 1307   Final Note   Assessment Type Final Discharge Note   Anticipated Discharge Disposition Home   Hospital Resources/Appts/Education Provided Appointments scheduled by Navigator/Coordinator   Post-Acute Status   Discharge Delays None known at this time

## 2022-12-07 VITALS
BODY MASS INDEX: 19.38 KG/M2 | HEIGHT: 68 IN | RESPIRATION RATE: 18 BRPM | SYSTOLIC BLOOD PRESSURE: 107 MMHG | OXYGEN SATURATION: 100 % | TEMPERATURE: 98 F | WEIGHT: 127.88 LBS | HEART RATE: 75 BPM | DIASTOLIC BLOOD PRESSURE: 63 MMHG

## 2022-12-07 DIAGNOSIS — U07.1 COVID-19 VIRUS DETECTED: ICD-10-CM

## 2022-12-07 LAB
ANION GAP SERPL CALC-SCNC: 7 MMOL/L (ref 8–16)
BASOPHILS # BLD AUTO: 0.03 K/UL (ref 0–0.2)
BASOPHILS NFR BLD: 0.5 % (ref 0–1.9)
BUN SERPL-MCNC: 23 MG/DL (ref 8–23)
CALCIUM SERPL-MCNC: 9.4 MG/DL (ref 8.7–10.5)
CHLORIDE SERPL-SCNC: 100 MMOL/L (ref 95–110)
CO2 SERPL-SCNC: 29 MMOL/L (ref 23–29)
CREAT SERPL-MCNC: 0.9 MG/DL (ref 0.5–1.4)
DIFFERENTIAL METHOD: ABNORMAL
EOSINOPHIL # BLD AUTO: 0.1 K/UL (ref 0–0.5)
EOSINOPHIL NFR BLD: 2 % (ref 0–8)
ERYTHROCYTE [DISTWIDTH] IN BLOOD BY AUTOMATED COUNT: 12.3 % (ref 11.5–14.5)
EST. GFR  (NO RACE VARIABLE): >60 ML/MIN/1.73 M^2
GLUCOSE SERPL-MCNC: 96 MG/DL (ref 70–110)
HCT VFR BLD AUTO: 38.7 % (ref 40–54)
HGB BLD-MCNC: 13.3 G/DL (ref 14–18)
IMM GRANULOCYTES # BLD AUTO: 0.02 K/UL (ref 0–0.04)
IMM GRANULOCYTES NFR BLD AUTO: 0.3 % (ref 0–0.5)
LYMPHOCYTES # BLD AUTO: 1.3 K/UL (ref 1–4.8)
LYMPHOCYTES NFR BLD: 20.7 % (ref 18–48)
MAGNESIUM SERPL-MCNC: 2.2 MG/DL (ref 1.6–2.6)
MCH RBC QN AUTO: 32.9 PG (ref 27–31)
MCHC RBC AUTO-ENTMCNC: 34.4 G/DL (ref 32–36)
MCV RBC AUTO: 96 FL (ref 82–98)
MONOCYTES # BLD AUTO: 0.7 K/UL (ref 0.3–1)
MONOCYTES NFR BLD: 12.3 % (ref 4–15)
NEUTROPHILS # BLD AUTO: 3.9 K/UL (ref 1.8–7.7)
NEUTROPHILS NFR BLD: 64.2 % (ref 38–73)
NRBC BLD-RTO: 0 /100 WBC
PLATELET # BLD AUTO: 134 K/UL (ref 150–450)
PMV BLD AUTO: 10.3 FL (ref 9.2–12.9)
POTASSIUM SERPL-SCNC: 3.5 MMOL/L (ref 3.5–5.1)
RBC # BLD AUTO: 4.04 M/UL (ref 4.6–6.2)
SODIUM SERPL-SCNC: 136 MMOL/L (ref 136–145)
WBC # BLD AUTO: 6.04 K/UL (ref 3.9–12.7)

## 2022-12-07 PROCEDURE — 80048 BASIC METABOLIC PNL TOTAL CA: CPT | Performed by: INTERNAL MEDICINE

## 2022-12-07 PROCEDURE — 36415 COLL VENOUS BLD VENIPUNCTURE: CPT | Performed by: INTERNAL MEDICINE

## 2022-12-07 PROCEDURE — 25000003 PHARM REV CODE 250: Performed by: INTERNAL MEDICINE

## 2022-12-07 PROCEDURE — 97535 SELF CARE MNGMENT TRAINING: CPT

## 2022-12-07 PROCEDURE — 85025 COMPLETE CBC W/AUTO DIFF WBC: CPT | Performed by: INTERNAL MEDICINE

## 2022-12-07 PROCEDURE — 83735 ASSAY OF MAGNESIUM: CPT | Performed by: INTERNAL MEDICINE

## 2022-12-07 RX ADMIN — FLUDROCORTISONE ACETATE 100 MCG: 0.1 TABLET ORAL at 09:12

## 2022-12-07 RX ADMIN — EZETIMIBE 10 MG: 10 TABLET ORAL at 09:12

## 2022-12-07 RX ADMIN — ASPIRIN 81 MG: 81 TABLET, COATED ORAL at 09:12

## 2022-12-07 RX ADMIN — ESCITALOPRAM OXALATE 20 MG: 10 TABLET ORAL at 09:12

## 2022-12-07 RX ADMIN — PANTOPRAZOLE SODIUM 40 MG: 40 TABLET, DELAYED RELEASE ORAL at 06:12

## 2022-12-07 RX ADMIN — POTASSIUM BICARBONATE 50 MEQ: 977.5 TABLET, EFFERVESCENT ORAL at 09:12

## 2022-12-07 NOTE — PLAN OF CARE
Patient impulsive and gets out of bed even when redirected. Plan to D/C today, but changed to tomorrow when daughter will be available to pick him up and stay with him. Patient more awake and alert today, OX2.      Problem: Adult Inpatient Plan of Care  Goal: Plan of Care Review  Outcome: Ongoing, Progressing  Goal: Absence of Hospital-Acquired Illness or Injury  Outcome: Ongoing, Progressing  Goal: Optimal Comfort and Wellbeing  Outcome: Ongoing, Progressing     Problem: Fall Injury Risk  Goal: Absence of Fall and Fall-Related Injury  Outcome: Ongoing, Progressing     Problem: Skin Injury Risk Increased  Goal: Skin Health and Integrity  Outcome: Ongoing, Progressing

## 2022-12-07 NOTE — DISCHARGE SUMMARY
[facility] Medicine  Discharge Summary      Patient Name: Jarred Harrison  MRN: [id number]  [facility]: [id number]  Patient Class: IP- Inpatient  Admission Date: 12/4/2022  Hospital Length of Stay: 1 days  Discharge Date and Time: 12/7/2022  1:42 PM  Attending Physician: No att. providers found   Discharging Provider: Damon Crespo MD  Primary Care Provider: Jeyson Waldron MD    Primary Care Team: Networked reference to record PCT       Hospital Course:   82 year old male with known dementia presented to the ED with 2 days of confusion beyond his baseline according to H&P in addition to lethargy, right facial droop and slurred speech.  He was admitted for CVA workup.  MRI brain negative for acute process suggesting possible TIA.  He has remain agitated and trying to get out of bed.  This improved with haloperidol.  Neurology consulted.  PT/OT/ST consulted. Admission labs positive for SARS-COV-2. He has not been hypoxic. CXR negative for infiltrate.  Suspect symptoms likely secondary to infectious encephalopathy in the setting of COVID-19.  Symptoms improving and patient has been deemed medically stable to be discharged home.  PT/OT recommended SNF versus home health however after discussion with family, it was deemed best to discharge home.  Given his dementia and risk for delirium, daughter did not want to opt for home health (would like to avoid new faces) which I concur with.  He is going to live with his daughter until he can be placed in assisted living facility.    Physical exam on day of discharge:   General:  No acute distress.  Appears stated age   Respiratory:  No tachypnea or accessory muscle use   Neuro: Alert.  Follows commands.  Pleasantly demented       Goals of Care Treatment Preferences:  Code Status: Full Code      Consults:   Consults (From admission, onward)        Status Ordering Provider     Inpatient consult to Neurology  Once        Provider:  Kristy Hearn MD     PHIL Tolentino new Assessment & Plan notes have been filed under this hospital service since the last note was generated.  Service: Hospital Medicine    Final Active Diagnoses:    Diagnosis Date Noted POA    PRINCIPAL PROBLEM:  Encephalopathy due to COVID-19 virus [U07.1, G93.49] 12/05/2022 Yes    TIA (transient ischemic attack) [G45.9] 12/04/2022 Yes    Encephalopathy, metabolic [G93.41] 12/04/2022 Yes    Dementia with behavioral disturbance [F03.918] 12/04/2022 Yes      Problems Resolved During this Admission:       Discharged Condition: stable    Disposition: Home or Self Care    Follow Up:   Follow-up Information     Jeyson Waldron MD Follow up.    Specialties: Family Medicine, Home Health Services, Hospice Services  Contact information:  [address]  [address]  [place] [address]  [phone]                       Patient Instructions:      Diet Cardiac     Notify your health care provider if you experience any of the following:  persistent dizziness, light-headedness, or visual disturbances     Notify your health care provider if you experience any of the following:  increased confusion or weakness     Notify your health care provider if you experience any of the following:   Order Comments: Worsening or recurrent symptoms     Notify your health care provider if you experience any of the following:  temperature >100.4     Notify your health care provider if you experience any of the following:  difficulty breathing or increased cough     Notify your health care provider if you experience any of the following:  persistent nausea and vomiting or diarrhea     Activity as tolerated       Significant Diagnostic Studies: Labs:   CMP   Recent Labs   Lab 12/06/22  1128 12/07/22  0501   * 136   K 3.9 3.5    100   CO2 27 29   * 96   BUN 23 23   CREATININE 1.2 0.9   CALCIUM 9.6 9.4   ANIONGAP 8 7*    and CBC   Recent Labs   Lab 12/06/22  1128  12/07/22  0501   WBC 6.75 6.04   HGB 13.0* 13.3*   HCT 38.4* 38.7*   * 134*     Cardiac Graphics: Echocardiogram:   Transthoracic echo (TTE) complete (Cupid Only):   Results for orders placed or performed during the hospital encounter of 12/04/22   Echo Saline Bubble? Yes   Result Value Ref Range    BSA 1.67 m2    TDI SEPTAL 0.07 m/s    LV LATERAL E/E' RATIO 4.33 m/s    LV SEPTAL E/E' RATIO 5.57 m/s    Left Ventricular Outflow Tract Mean Velocity 0.50 cm/s    Left Ventricular Outflow Tract Mean Gradient 1.16 mmHg    Pulmonary Valve Mean Velocity 0.85 m/s    AORTIC VALVE CUSP SEPERATION 2.07 cm    TDI LATERAL 0.09 m/s    PV PEAK VELOCITY 1.09 cm/s    LVIDd 4.83 3.5 - 6.0 cm    IVS 1.12 (A) 0.6 - 1.1 cm    Posterior Wall 1.10 0.6 - 1.1 cm    Ao root annulus 3.33 cm    LVIDs 5.52 (A) 2.1 - 4.0 cm    FS -14 28 - 44 %    LV mass 198.39 g    LA size 3.53 cm    TAPSE 2.28 cm    RV S' 0.01 cm/s    Left Ventricle Relative Wall Thickness 0.46 cm    AV mean gradient 3 mmHg    AV valve area 2.69 cm2    AV Velocity Ratio 0.70     AV index (prosthetic) 0.72     MV valve area p 1/2 method 9.16 cm2    E/A ratio 0.63     Mean e' 0.08 m/s    E wave deceleration time 82.88 msec    IVRT 99.45 msec    LVOT diameter 2.19 cm    LVOT area 3.8 cm2    LVOT peak zach 0.74 m/s    LVOT peak VTI 15.10 cm    Ao peak zach 1.06 m/s    Ao VTI 21.1 cm    LVOT stroke volume 56.85 cm3    AV peak gradient 4 mmHg    MV peak gradient 2 mmHg    E/E' ratio 4.88 m/s    MV Peak E Zach 0.39 m/s    MV stenosis pressure 1/2 time 24.03 ms    MV Peak A Zach 0.62 m/s    LV Systolic Volume 55.17 mL    LV Systolic Volume Index 32.6 mL/m2    LV Diastolic Volume 109.12 mL    LV Diastolic Volume Index 64.57 mL/m2    LV Mass Index 117 g/m2    LA Volume Index (Mod) 17.4 mL/m2    LA volume (mod) 29.35 cm3    Right Atrial Pressure (from IVC) 3 mmHg    EF 58 %    Narrative    · There is no evidence of intracardiac shunting.  · The left ventricle is normal in size with  mild concentric hypertrophy   and normal systolic function.  · The estimated ejection fraction is 58%.  · Normal left ventricular diastolic function.  · Normal right ventricular size with normal right ventricular systolic   function.  · Mild mitral regurgitation.  · Normal central venous pressure (3 mmHg).          Pending Diagnostic Studies:     None         Medications:  Reconciled Home Medications:      Medication List      CHANGE how you take these medications    EScitalopram oxalate 10 MG tablet  Commonly known as: LEXAPRO  Take 1 tablet (10 mg total) by mouth once daily.  What changed:   · medication strength  · how much to take     fludrocortisone 0.1 mg Tab  Commonly known as: FLORINEF  TAKE 1 TABLET BY MOUTH ONCE DAILY  What changed: when to take this        CONTINUE taking these medications    aspirin 81 MG EC tablet  Commonly known as: ECOTRIN  Take 81 mg by mouth once daily.     ezetimibe 10 mg tablet  Commonly known as: ZETIA  Take 1 tablet (10 mg total) by mouth once daily.     latanoprost 0.005 % ophthalmic solution  Place 1 drop into both eyes every evening.     midodrine 2.5 MG Tab  Commonly known as: PROAMATINE  Take 1 tablet (2.5 mg total) by mouth 3 (three) times daily with meals.     multivitamin capsule  Take 1 capsule by mouth once daily.     nitroGLYCERIN 0.4 MG SL tablet  Commonly known as: NITROSTAT  Place 1 tablet (0.4 mg total) under the tongue every 5 (five) minutes as needed for Chest pain.     pantoprazole 40 MG tablet  Commonly known as: PROTONIX  Take 1 tablet (40 mg total) by mouth once daily.     QUEtiapine 25 MG Tab  Commonly known as: SEROQUEL  Take 1 tablet (25 mg total) by mouth before dinner.        STOP taking these medications    hyoscyamine 0.125 mg Tab  Commonly known as: ANASPAZ,LEVSIN              Time spent on the discharge of patient: 35 minutes         Damon Crespo MD  Department of Hospital Medicine  [facility]

## 2022-12-07 NOTE — PLAN OF CARE
Patient cleared to discharge by case management.  Patient discharging home self care no needs.  Per MD family refused Home Health and SNF.       12/07/22 1212   Final Note   Assessment Type Final Discharge Note   Anticipated Discharge Disposition Home   Hospital Resources/Appts/Education Provided Appointments scheduled by Navigator/Coordinator   Post-Acute Status   Discharge Delays None known at this time

## 2022-12-07 NOTE — PLAN OF CARE
Patient in bed, rested well, is very pleasant and slightly impulsive, patient easily redirectable, Orientation is improving, he is currently oriented to person and time, he is able to communicate needs, ambulated to bathroom a few times tonight with assistance w/o incident, vss, patient voices no questions or concerns, safety precautions remains in place, bed alarm activated, patient reminded not to get OOB w/o assistance.   Problem: Adult Inpatient Plan of Care  Goal: Plan of Care Review  Outcome: Ongoing, Progressing  Goal: Patient-Specific Goal (Individualized)  Outcome: Ongoing, Progressing  Goal: Absence of Hospital-Acquired Illness or Injury  Outcome: Ongoing, Progressing  Goal: Optimal Comfort and Wellbeing  Outcome: Ongoing, Progressing  Goal: Readiness for Transition of Care  Outcome: Ongoing, Progressing     Problem: Fall Injury Risk  Goal: Absence of Fall and Fall-Related Injury  Outcome: Ongoing, Progressing     Problem: Skin Injury Risk Increased  Goal: Skin Health and Integrity  Outcome: Ongoing, Progressing     Problem: Oral Intake Inadequate  Goal: Improved Oral Intake  Outcome: Ongoing, Progressing

## 2022-12-07 NOTE — NURSING
Patient in bed AA oriented to self only,patient noted to be more aware tonight and responds appropriately, however he continues to impulsively get out of bed w/o assistance and has been redirected back into the bed twice since shift change, safety precautions remains in place, side rails are up X3, bed low and locked, bedside table and call light is within reach, bed alarm activated, current plan of care in progress.

## 2022-12-07 NOTE — HOSPITAL COURSE
82 year old male with known dementia presented to the ED with 2 days of confusion beyond his baseline according to H&P in addition to lethargy, right facial droop and slurred speech.  He was admitted for CVA workup.  MRI brain negative for acute process suggesting possible TIA.  He has remain agitated and trying to get out of bed.  This improved with haloperidol.  Neurology consulted.  PT/OT/ST consulted. Admission labs positive for SARS-COV-2. He has not been hypoxic. CXR negative for infiltrate.  Suspect symptoms likely secondary to infectious encephalopathy in the setting of COVID-19.  Symptoms improving and patient has been deemed medically stable to be discharged home.  PT/OT recommended SNF versus home health however after discussion with family, it was deemed best to discharge home.  Given his dementia and risk for delirium, daughter did not want to opt for home health (would like to avoid new faces) which I concur with.  He is going to live with his daughter until he can be placed in assisted living facility.    Physical exam on day of discharge:   General:  No acute distress.  Appears stated age   Respiratory:  No tachypnea or accessory muscle use   Neuro: Alert.  Follows commands.  Pleasantly demented

## 2022-12-07 NOTE — PT/OT/SLP PROGRESS
Occupational Therapy   Treatment    Name: Jarred Harrison  MRN: [id number]  Admitting Diagnosis:  Encephalopathy due to COVID-19 virus       Recommendations:     Discharge Recommendations:  (SNF vs HHOT with 24/7 (A))  Discharge Equipment Recommendations:   (TBD)  Barriers to discharge:       Assessment:     Jarred Harrison is a 82 y.o. male with a medical diagnosis of Encephalopathy due to COVID-19 virus.  He presents with the following Performance deficits affecting function are weakness, impaired endurance, impaired self care skills, impaired functional mobility, gait instability, impaired balance, decreased lower extremity function, decreased safety awareness, impaired cardiopulmonary response to activity, impaired coordination.     Rehab Prognosis:  Good; patient would benefit from acute skilled OT services to address these deficits and reach maximum level of function.       Plan:     Patient to be seen 5 x/week to address the above listed problems via self-care/home management, therapeutic activities, therapeutic exercises  Plan of Care Expires: 01/05/23  Plan of Care Reviewed with: patient    Subjective     Pain/Comfort:  Pain Rating 1: 0/10  Pain Rating Post-Intervention 1: 0/10    Objective:     Communicated with: nurse prior to session.  Patient found HOB elevated with peripheral IV upon OT entry to room.    General Precautions: Standard, fall, droplet, contact, airborne    Orthopedic Precautions:N/A  Braces: N/A  Respiratory Status: Room air     Occupational Performance:     Bed Mobility:    Patient completed Supine to Sit with supervision  Patient completed Sit to Supine with supervision     Functional Mobility/Transfers:  Patient completed Sit <> Stand Transfer with contact guard assistance  with  hand-held assist   Patient completed Toilet Transfer Step Transfer technique with contact guard assistance with  hand-held assist  Functional Mobility: ambulated 40' with HHA and CGA    Activities of Daily  Living:  Upper Body Dressing: contact guard assistance personal shirt  Lower Body Dressing: contact guard assistance personal underwear and pants  Toileting: contact guard assistance at toilet      [facility] 6 Click ADL:      Treatment & Education:  OT role, safety awareness, call for assistance with call bell    Patient left HOB elevated with all lines intact, call button in reach, bed alarm on, and nurse notified    GOALS:   Multidisciplinary Problems       Occupational Therapy Goals          Problem: Occupational Therapy    Goal Priority Disciplines Outcome Interventions   Occupational Therapy Goal     OT, PT/OT Ongoing, Progressing    Description: Goals to be met by: 1/5/2022     Patient will increase functional independence with ADLs by performing:    UE Dressing with Minimal Assistance.  LE Dressing with Minimal Assistance.  Grooming while seated with Minimal Assistance.  Toileting from bedside commode with Minimal Assistance for hygiene and clothing management.   Sitting at edge of bed x10 minutes with Supervision.  Supine to sit with Supervision.  Toilet transfer to bedside commode with Minimal Assistance.  Upper extremity exercise program x10 reps per handout, with assistance as needed.  Attend to ADL task for 15 minutes with minimal verbal/tactile cues.  Follow 1 step ADL task with minimal verbal/tactile cues.                         Time Tracking:     OT Date of Treatment: 12/07/22  OT Start Time: 1136  OT Stop Time: 1150  OT Total Time (min): 14 min    Billable Minutes:Self Care/Home Management 14    OT/MARCO: OT          12/7/2022

## 2022-12-07 NOTE — PT/OT/SLP PROGRESS
Physical Therapy      Patient Name:  Jarred Harrison   MRN:  [id number]    Patient not seen today secondary to Other (Comment) (Pt prepping for discharge.). Will follow-up if pt does not discharge.

## 2022-12-07 NOTE — PT/OT/SLP DISCHARGE
Occupational Therapy Discharge Summary    Jarred Harrison  MRN: [id number]   Principal Problem: Encephalopathy due to COVID-19 virus      Patient Discharged from acute Occupational Therapy on 12/7/2022.  Please refer to prior OT note dated 12/7/2022 for functional status.    Assessment:      Goals partially met.    Objective:     GOALS:   Multidisciplinary Problems       Occupational Therapy Goals       Not on file              Multidisciplinary Problems (Resolved)          Problem: Occupational Therapy    Goal Priority Disciplines Outcome Interventions   Occupational Therapy Goal   (Resolved)     OT, PT/OT Met    Description: Goals to be met by: 1/5/2022     Patient will increase functional independence with ADLs by performing:    UE Dressing with Minimal Assistance.  LE Dressing with Minimal Assistance.  Grooming while seated with Minimal Assistance.  Toileting from bedside commode with Minimal Assistance for hygiene and clothing management.   Sitting at edge of bed x10 minutes with Supervision.  Supine to sit with Supervision.  Toilet transfer to bedside commode with Minimal Assistance.  Upper extremity exercise program x10 reps per handout, with assistance as needed.  Attend to ADL task for 15 minutes with minimal verbal/tactile cues.  Follow 1 step ADL task with minimal verbal/tactile cues.                         Reasons for Discontinuation of Therapy Services  Transfer to alternate level of care. and Satisfactory goal achievement.      Plan:     Patient Discharged to: Home with Home Health Service    12/7/2022

## 2022-12-07 NOTE — NURSING
D/C education verbally as well as written given to patient and daughter. Patient belongings gathered and sent with patient. D/C home with daughter. Transported to vehicle in wheelchair by staff.

## 2022-12-08 ENCOUNTER — TELEPHONE (OUTPATIENT)
Dept: FAMILY MEDICINE | Facility: CLINIC | Age: 82
End: 2022-12-08

## 2022-12-08 NOTE — TELEPHONE ENCOUNTER
----- Message from Raine Gaytan LPN sent at 12/6/2022  4:21 PM CST -----  Regarding: HFU  Call patient - needs post-hospital phone call within 2 business days and hospital follow up visit scheduled within 7-14 days.    Expected discharge- 12/6/22    Scheduled. - 12/19/22 @ 11:40

## 2022-12-08 NOTE — TELEPHONE ENCOUNTER
Spoke with patients daughter and did HFU call and let them know about the appointment. She states they are cancelling this appointment because she is going to be taking him back to [place] with her because she doesn't have anyone to stay with him. She doesn't know when she will be back in the state but she agrees to call us when they will be back so he can be seen.

## 2022-12-09 ENCOUNTER — PATIENT MESSAGE (OUTPATIENT)
Dept: CARDIOLOGY | Facility: CLINIC | Age: 82
End: 2022-12-09
Payer: MEDICARE

## 2022-12-12 ENCOUNTER — PATIENT MESSAGE (OUTPATIENT)
Dept: FAMILY MEDICINE | Facility: CLINIC | Age: 82
End: 2022-12-12

## 2022-12-12 ENCOUNTER — TELEPHONE (OUTPATIENT)
Dept: FAMILY MEDICINE | Facility: CLINIC | Age: 82
End: 2022-12-12

## 2022-12-12 ENCOUNTER — HOSPITAL ENCOUNTER (OUTPATIENT)
Facility: HOSPITAL | Age: 82
Discharge: HOSPICE | End: 2022-12-15
Attending: EMERGENCY MEDICINE | Admitting: INTERNAL MEDICINE
Payer: MEDICARE

## 2022-12-12 DIAGNOSIS — A41.9 SEPSIS: ICD-10-CM

## 2022-12-12 DIAGNOSIS — F03.918 DEMENTIA WITH BEHAVIORAL DISTURBANCE: ICD-10-CM

## 2022-12-12 DIAGNOSIS — F03.918 DEMENTIA WITH BEHAVIORAL PROBLEM: ICD-10-CM

## 2022-12-12 DIAGNOSIS — R41.82 ALTERED MENTAL STATUS, UNSPECIFIED ALTERED MENTAL STATUS TYPE: ICD-10-CM

## 2022-12-12 DIAGNOSIS — R50.9 FEVER, UNSPECIFIED FEVER CAUSE: Primary | ICD-10-CM

## 2022-12-12 LAB
ALBUMIN SERPL BCP-MCNC: 4.1 G/DL (ref 3.5–5.2)
ALP SERPL-CCNC: 42 U/L (ref 55–135)
ALT SERPL W/O P-5'-P-CCNC: 21 U/L (ref 10–44)
ANION GAP SERPL CALC-SCNC: 9 MMOL/L (ref 8–16)
AST SERPL-CCNC: 29 U/L (ref 10–40)
BACTERIA #/AREA URNS HPF: NEGATIVE /HPF
BASOPHILS # BLD AUTO: 0.03 K/UL (ref 0–0.2)
BASOPHILS NFR BLD: 0.2 % (ref 0–1.9)
BILIRUB SERPL-MCNC: 1 MG/DL (ref 0.1–1)
BILIRUB UR QL STRIP: NEGATIVE
BUN SERPL-MCNC: 22 MG/DL (ref 8–23)
CALCIUM SERPL-MCNC: 9.2 MG/DL (ref 8.7–10.5)
CHLORIDE SERPL-SCNC: 104 MMOL/L (ref 95–110)
CLARITY UR: CLEAR
CO2 SERPL-SCNC: 23 MMOL/L (ref 23–29)
COLOR UR: YELLOW
CREAT SERPL-MCNC: 1.1 MG/DL (ref 0.5–1.4)
CREAT SERPL-MCNC: 1.1 MG/DL (ref 0.5–1.4)
DIFFERENTIAL METHOD: ABNORMAL
EOSINOPHIL # BLD AUTO: 0 K/UL (ref 0–0.5)
EOSINOPHIL NFR BLD: 0.2 % (ref 0–8)
ERYTHROCYTE [DISTWIDTH] IN BLOOD BY AUTOMATED COUNT: 12.3 % (ref 11.5–14.5)
EST. GFR  (NO RACE VARIABLE): >60 ML/MIN/1.73 M^2
GLUCOSE SERPL-MCNC: 155 MG/DL (ref 70–110)
GLUCOSE SERPL-MCNC: 157 MG/DL (ref 70–110)
GLUCOSE UR QL STRIP: NEGATIVE
HCT VFR BLD AUTO: 34.5 % (ref 40–54)
HGB BLD-MCNC: 11.6 G/DL (ref 14–18)
HGB UR QL STRIP: ABNORMAL
HYALINE CASTS #/AREA URNS LPF: 7 /LPF
IMM GRANULOCYTES # BLD AUTO: 0.23 K/UL (ref 0–0.04)
IMM GRANULOCYTES NFR BLD AUTO: 1.4 % (ref 0–0.5)
INFLUENZA A, MOLECULAR: NEGATIVE
INFLUENZA B, MOLECULAR: NEGATIVE
KETONES UR QL STRIP: ABNORMAL
LACTATE SERPL-SCNC: 1.6 MMOL/L (ref 0.5–1.9)
LEUKOCYTE ESTERASE UR QL STRIP: NEGATIVE
LYMPHOCYTES # BLD AUTO: 0.3 K/UL (ref 1–4.8)
LYMPHOCYTES NFR BLD: 1.9 % (ref 18–48)
MCH RBC QN AUTO: 32.5 PG (ref 27–31)
MCHC RBC AUTO-ENTMCNC: 33.6 G/DL (ref 32–36)
MCV RBC AUTO: 97 FL (ref 82–98)
MICROSCOPIC COMMENT: ABNORMAL
MONOCYTES # BLD AUTO: 0.7 K/UL (ref 0.3–1)
MONOCYTES NFR BLD: 4.4 % (ref 4–15)
NEUTROPHILS # BLD AUTO: 14.8 K/UL (ref 1.8–7.7)
NEUTROPHILS NFR BLD: 91.9 % (ref 38–73)
NITRITE UR QL STRIP: NEGATIVE
NRBC BLD-RTO: 0 /100 WBC
PH UR STRIP: 8 [PH] (ref 5–8)
PLATELET # BLD AUTO: 128 K/UL (ref 150–450)
PMV BLD AUTO: 10.6 FL (ref 9.2–12.9)
POTASSIUM SERPL-SCNC: 4.2 MMOL/L (ref 3.5–5.1)
PROCALCITONIN SERPL IA-MCNC: 0.35 NG/ML (ref 0–0.5)
PROT SERPL-MCNC: 7.2 G/DL (ref 6–8.4)
PROT UR QL STRIP: ABNORMAL
RBC # BLD AUTO: 3.57 M/UL (ref 4.6–6.2)
RBC #/AREA URNS HPF: 83 /HPF (ref 0–4)
SAMPLE: NORMAL
SODIUM SERPL-SCNC: 136 MMOL/L (ref 136–145)
SP GR UR STRIP: 1.02 (ref 1–1.03)
SPECIMEN SOURCE: NORMAL
SQUAMOUS #/AREA URNS HPF: 9 /HPF
URN SPEC COLLECT METH UR: ABNORMAL
UROBILINOGEN UR STRIP-ACNC: ABNORMAL EU/DL
WBC # BLD AUTO: 16.1 K/UL (ref 3.9–12.7)
WBC #/AREA URNS HPF: 2 /HPF (ref 0–5)

## 2022-12-12 PROCEDURE — G0378 HOSPITAL OBSERVATION PER HR: HCPCS

## 2022-12-12 PROCEDURE — 36415 COLL VENOUS BLD VENIPUNCTURE: CPT | Performed by: EMERGENCY MEDICINE

## 2022-12-12 PROCEDURE — 81001 URINALYSIS AUTO W/SCOPE: CPT | Performed by: EMERGENCY MEDICINE

## 2022-12-12 PROCEDURE — 93010 EKG 12-LEAD: ICD-10-PCS | Mod: ,,, | Performed by: INTERNAL MEDICINE

## 2022-12-12 PROCEDURE — 25000003 PHARM REV CODE 250: Performed by: INTERNAL MEDICINE

## 2022-12-12 PROCEDURE — 87502 INFLUENZA DNA AMP PROBE: CPT | Performed by: EMERGENCY MEDICINE

## 2022-12-12 PROCEDURE — 83605 ASSAY OF LACTIC ACID: CPT | Mod: 91 | Performed by: EMERGENCY MEDICINE

## 2022-12-12 PROCEDURE — 84145 PROCALCITONIN (PCT): CPT | Performed by: EMERGENCY MEDICINE

## 2022-12-12 PROCEDURE — 96361 HYDRATE IV INFUSION ADD-ON: CPT

## 2022-12-12 PROCEDURE — 96365 THER/PROPH/DIAG IV INF INIT: CPT

## 2022-12-12 PROCEDURE — 96372 THER/PROPH/DIAG INJ SC/IM: CPT | Performed by: INTERNAL MEDICINE

## 2022-12-12 PROCEDURE — 99285 EMERGENCY DEPT VISIT HI MDM: CPT | Mod: 25

## 2022-12-12 PROCEDURE — 93005 ELECTROCARDIOGRAM TRACING: CPT | Performed by: INTERNAL MEDICINE

## 2022-12-12 PROCEDURE — 25000003 PHARM REV CODE 250: Performed by: EMERGENCY MEDICINE

## 2022-12-12 PROCEDURE — 87040 BLOOD CULTURE FOR BACTERIA: CPT | Performed by: EMERGENCY MEDICINE

## 2022-12-12 PROCEDURE — 63600175 PHARM REV CODE 636 W HCPCS: Performed by: INTERNAL MEDICINE

## 2022-12-12 PROCEDURE — 51701 INSERT BLADDER CATHETER: CPT

## 2022-12-12 PROCEDURE — 85025 COMPLETE CBC W/AUTO DIFF WBC: CPT | Performed by: EMERGENCY MEDICINE

## 2022-12-12 PROCEDURE — 82962 GLUCOSE BLOOD TEST: CPT

## 2022-12-12 PROCEDURE — 80053 COMPREHEN METABOLIC PANEL: CPT | Performed by: EMERGENCY MEDICINE

## 2022-12-12 PROCEDURE — 63600175 PHARM REV CODE 636 W HCPCS: Performed by: EMERGENCY MEDICINE

## 2022-12-12 PROCEDURE — 93010 ELECTROCARDIOGRAM REPORT: CPT | Mod: ,,, | Performed by: INTERNAL MEDICINE

## 2022-12-12 RX ORDER — EZETIMIBE 10 MG/1
10 TABLET ORAL DAILY
Status: DISCONTINUED | OUTPATIENT
Start: 2022-12-13 | End: 2022-12-15 | Stop reason: HOSPADM

## 2022-12-12 RX ORDER — ENOXAPARIN SODIUM 100 MG/ML
40 INJECTION SUBCUTANEOUS EVERY 24 HOURS
Status: DISCONTINUED | OUTPATIENT
Start: 2022-12-12 | End: 2022-12-15 | Stop reason: HOSPADM

## 2022-12-12 RX ORDER — ACETAMINOPHEN 325 MG/1
650 TABLET ORAL
Status: COMPLETED | OUTPATIENT
Start: 2022-12-12 | End: 2022-12-12

## 2022-12-12 RX ORDER — LATANOPROST 50 UG/ML
1 SOLUTION/ DROPS OPHTHALMIC NIGHTLY
Status: DISCONTINUED | OUTPATIENT
Start: 2022-12-12 | End: 2022-12-15 | Stop reason: HOSPADM

## 2022-12-12 RX ORDER — MIDODRINE HYDROCHLORIDE 2.5 MG/1
2.5 TABLET ORAL
Status: DISCONTINUED | OUTPATIENT
Start: 2022-12-13 | End: 2022-12-15 | Stop reason: HOSPADM

## 2022-12-12 RX ORDER — ACETAMINOPHEN 325 MG/1
650 TABLET ORAL EVERY 8 HOURS PRN
Status: DISCONTINUED | OUTPATIENT
Start: 2022-12-12 | End: 2022-12-15 | Stop reason: HOSPADM

## 2022-12-12 RX ORDER — ESCITALOPRAM OXALATE 10 MG/1
10 TABLET ORAL DAILY
Status: DISCONTINUED | OUTPATIENT
Start: 2022-12-13 | End: 2022-12-15 | Stop reason: HOSPADM

## 2022-12-12 RX ORDER — PANTOPRAZOLE SODIUM 40 MG/1
40 TABLET, DELAYED RELEASE ORAL DAILY
Status: DISCONTINUED | OUTPATIENT
Start: 2022-12-13 | End: 2022-12-15 | Stop reason: HOSPADM

## 2022-12-12 RX ORDER — ASPIRIN 81 MG/1
81 TABLET ORAL DAILY
Status: DISCONTINUED | OUTPATIENT
Start: 2022-12-13 | End: 2022-12-15 | Stop reason: HOSPADM

## 2022-12-12 RX ORDER — TALC
6 POWDER (GRAM) TOPICAL NIGHTLY PRN
Status: DISCONTINUED | OUTPATIENT
Start: 2022-12-12 | End: 2022-12-15 | Stop reason: HOSPADM

## 2022-12-12 RX ORDER — NITROGLYCERIN 0.4 MG/1
0.4 TABLET SUBLINGUAL EVERY 5 MIN PRN
Status: DISCONTINUED | OUTPATIENT
Start: 2022-12-12 | End: 2022-12-15 | Stop reason: HOSPADM

## 2022-12-12 RX ORDER — QUETIAPINE FUMARATE 25 MG/1
25 TABLET, FILM COATED ORAL 2 TIMES DAILY
Status: DISCONTINUED | OUTPATIENT
Start: 2022-12-12 | End: 2022-12-13

## 2022-12-12 RX ADMIN — ACETAMINOPHEN 650 MG: 325 TABLET ORAL at 05:12

## 2022-12-12 RX ADMIN — ENOXAPARIN SODIUM 40 MG: 40 INJECTION SUBCUTANEOUS at 11:12

## 2022-12-12 RX ADMIN — QUETIAPINE 25 MG: 25 TABLET ORAL at 11:12

## 2022-12-12 RX ADMIN — LATANOPROST 1 DROP: 50 SOLUTION OPHTHALMIC at 11:12

## 2022-12-12 RX ADMIN — SODIUM CHLORIDE, SODIUM LACTATE, POTASSIUM CHLORIDE, AND CALCIUM CHLORIDE 1728 ML: .6; .31; .03; .02 INJECTION, SOLUTION INTRAVENOUS at 05:12

## 2022-12-12 RX ADMIN — PIPERACILLIN SODIUM AND TAZOBACTAM SODIUM 4.5 G: 4; .5 INJECTION, POWDER, LYOPHILIZED, FOR SOLUTION INTRAVENOUS at 07:12

## 2022-12-12 NOTE — ED NOTES
FAMILY ACCEPTS SEPTIC PROTOCOL.  REMAINS AGITATED AND PULLING AT LINES. FAMILY AT BS.  FALLS PRECAUTIONS.

## 2022-12-12 NOTE — TELEPHONE ENCOUNTER
"Per Dr. Waldron "okay for Aricept 5mg po q pm #90 1 refill." Will hold off on ordering since he is currently in the ER  "

## 2022-12-12 NOTE — ED NOTES
FAMILY STATES NO PSYCHIATRIC DRUGS, NO CPR, NO INTUBATION.  WILL CALL MD TO BS FOR VERIFICATION. REMAINS CONFUSED.

## 2022-12-12 NOTE — TELEPHONE ENCOUNTER
----- Message from Joya Pierce MA sent at 12/12/2022  2:18 PM CST -----  Regarding: urgent HFU appt request.  Pt daughter radha calling to ask if  can see the pt asap for his HFU because she recorded him going in and out of the episodes he has been having and she is really concerned. [place] [phone]-

## 2022-12-13 PROBLEM — D72.829 LEUCOCYTOSIS: Status: ACTIVE | Noted: 2022-12-13

## 2022-12-13 PROBLEM — R41.0 DELIRIUM: Status: ACTIVE | Noted: 2022-12-13

## 2022-12-13 PROBLEM — R50.9 FEVER: Status: ACTIVE | Noted: 2022-12-13

## 2022-12-13 LAB — LACTATE SERPL-SCNC: 1.6 MMOL/L (ref 0.5–1.9)

## 2022-12-13 PROCEDURE — 94761 N-INVAS EAR/PLS OXIMETRY MLT: CPT

## 2022-12-13 PROCEDURE — 96372 THER/PROPH/DIAG INJ SC/IM: CPT | Performed by: INTERNAL MEDICINE

## 2022-12-13 PROCEDURE — 63600175 PHARM REV CODE 636 W HCPCS: Performed by: INTERNAL MEDICINE

## 2022-12-13 PROCEDURE — G0378 HOSPITAL OBSERVATION PER HR: HCPCS

## 2022-12-13 PROCEDURE — 96375 TX/PRO/DX INJ NEW DRUG ADDON: CPT

## 2022-12-13 PROCEDURE — 25000003 PHARM REV CODE 250: Performed by: INTERNAL MEDICINE

## 2022-12-13 RX ORDER — LORAZEPAM 2 MG/ML
1 INJECTION INTRAMUSCULAR ONCE
Status: DISCONTINUED | OUTPATIENT
Start: 2022-12-13 | End: 2022-12-15 | Stop reason: HOSPADM

## 2022-12-13 RX ORDER — LORAZEPAM 2 MG/ML
0.5 INJECTION INTRAMUSCULAR EVERY 6 HOURS PRN
Status: DISCONTINUED | OUTPATIENT
Start: 2022-12-13 | End: 2022-12-15 | Stop reason: HOSPADM

## 2022-12-13 RX ORDER — ZIPRASIDONE MESYLATE 20 MG/ML
10 INJECTION, POWDER, LYOPHILIZED, FOR SOLUTION INTRAMUSCULAR EVERY 12 HOURS
Status: DISCONTINUED | OUTPATIENT
Start: 2022-12-13 | End: 2022-12-15

## 2022-12-13 RX ADMIN — MIDODRINE HYDROCHLORIDE 2.5 MG: 2.5 TABLET ORAL at 11:12

## 2022-12-13 RX ADMIN — THERA TABS 1 TABLET: TAB at 09:12

## 2022-12-13 RX ADMIN — QUETIAPINE 25 MG: 25 TABLET ORAL at 09:12

## 2022-12-13 RX ADMIN — PANTOPRAZOLE SODIUM 40 MG: 40 TABLET, DELAYED RELEASE ORAL at 06:12

## 2022-12-13 RX ADMIN — ESCITALOPRAM OXALATE 10 MG: 10 TABLET ORAL at 09:12

## 2022-12-13 RX ADMIN — MIDODRINE HYDROCHLORIDE 2.5 MG: 2.5 TABLET ORAL at 07:12

## 2022-12-13 RX ADMIN — ASPIRIN 81 MG: 81 TABLET, COATED ORAL at 09:12

## 2022-12-13 RX ADMIN — MIDODRINE HYDROCHLORIDE 2.5 MG: 2.5 TABLET ORAL at 04:12

## 2022-12-13 RX ADMIN — TRAZODONE HYDROCHLORIDE 25 MG: 50 TABLET ORAL at 03:12

## 2022-12-13 RX ADMIN — LORAZEPAM 0.5 MG: 2 INJECTION INTRAMUSCULAR; INTRAVENOUS at 09:12

## 2022-12-13 RX ADMIN — EZETIMIBE 10 MG: 10 TABLET ORAL at 09:12

## 2022-12-13 RX ADMIN — ACETAMINOPHEN 650 MG: 325 TABLET ORAL at 11:12

## 2022-12-13 RX ADMIN — ZIPRASIDONE MESYLATE 10 MG: 20 INJECTION, POWDER, LYOPHILIZED, FOR SOLUTION INTRAMUSCULAR at 04:12

## 2022-12-13 RX ADMIN — ENOXAPARIN SODIUM 40 MG: 40 INJECTION SUBCUTANEOUS at 04:12

## 2022-12-13 NOTE — SUBJECTIVE & OBJECTIVE
Interval History:  See hospital course.  Patient is restless, fidgeting and remains hyperactive.  Daughter at bedside states ongoing, unable to care for patient at home, family now contemplating nursing home placement with hospice.  Febrile to 102.3 on presentation but afebrile since, on room air.  Labs with WBC 16, hemoglobin 11.6, procalcitonin 0.35, blood cultures no growth to date.  Discussed with daughter at bedside.  Discussed with nursing.  Discussed with case management.    Review of Systems   Unable to perform ROS: Dementia   Objective:     Vital Signs (Most Recent):  Temp:  (Patient refused) (12/13/22 1100)  Pulse: 74 (12/13/22 0802)  Resp: 17 (12/13/22 0802)  BP: (!) 142/59 (12/13/22 0759)  SpO2: 96 % (12/13/22 0802)   Vital Signs (24h Range):  Temp:  [98.1 °F (36.7 °C)-102.3 °F (39.1 °C)] 98.5 °F (36.9 °C)  Pulse:  [67-97] 74  Resp:  [17-18] 17  SpO2:  [94 %-99 %] 96 %  BP: (136-158)/(59-75) 142/59     Weight: 57.5 kg (126 lb 12.2 oz)  Body mass index is 19.27 kg/m².    Intake/Output Summary (Last 24 hours) at 12/13/2022 1439  Last data filed at 12/12/2022 2344  Gross per 24 hour   Intake 1828 ml   Output 350 ml   Net 1478 ml      Physical Exam  Vitals and nursing note reviewed.   Constitutional:       Comments: Elderly male, chronically ill-appearing, no apparent acute distress   HENT:      Head: Normocephalic and atraumatic.      Ears:      Comments: Hard of hearing     Mouth/Throat:      Mouth: Mucous membranes are moist.   Cardiovascular:      Rate and Rhythm: Normal rate and regular rhythm.   Pulmonary:      Comments: On room air, no wheeze or accessory muscle use  Genitourinary:     Comments: Wearing diaper  Neurological:      Mental Status: He is alert.      Comments: Able to state his name, moving all 4 extremities   Psychiatric:      Comments: Constantly pulling at his diaper, constantly trying to stand up, get out of bed, restless and fidgeting, CAM +ve       Significant Labs: BMP:   Recent  Labs   Lab 12/12/22  1646   *      K 4.2      CO2 23   BUN 22   CREATININE 1.1   CALCIUM 9.2     CBC:   Recent Labs   Lab 12/12/22  1646   WBC 16.10*   HGB 11.6*   HCT 34.5*   *     CMP:   Recent Labs   Lab 12/12/22  1646      K 4.2      CO2 23   *   BUN 22   CREATININE 1.1   CALCIUM 9.2   PROT 7.2   ALBUMIN 4.1   BILITOT 1.0   ALKPHOS 42*   AST 29   ALT 21   ANIONGAP 9     Cardiac Markers: No results for input(s): CKMB, MYOGLOBIN, BNP, TROPISTAT in the last 48 hours.  Lactic Acid:   Recent Labs   Lab 12/12/22  1646 12/12/22  2303   LACTATE 1.6 1.6     Magnesium: No results for input(s): MG in the last 48 hours.  POCT Glucose: No results for input(s): POCTGLUCOSE in the last 48 hours.  Troponin: No results for input(s): TROPONINI, TROPONINIHS in the last 48 hours.  TSH:   Recent Labs   Lab 12/04/22  0835   TSH 2.470     Urine Culture: No results for input(s): LABURIN in the last 48 hours.  Urine Studies:   Recent Labs   Lab 12/12/22  1710   COLORU Yellow   APPEARANCEUA Clear   PHUR 8.0   SPECGRAV 1.020   PROTEINUA 1+*   GLUCUA Negative   KETONESU 2+*   BILIRUBINUA Negative   OCCULTUA 3+*   NITRITE Negative   UROBILINOGEN 2.0-3.0*   LEUKOCYTESUR Negative   RBCUA 83*   WBCUA 2   BACTERIA Negative   SQUAMEPITHEL 9   HYALINECASTS 7*       Significant Imaging: I have reviewed all pertinent imaging results/findings within the past 24 hours.    MRI Brain Without Contrast    Result Date: 12/4/2022  MRI of the brain Clinical history is TIA. COMPARISON: Head CT at 8:39 AM Multiplanar images of the brain were obtained. The ventricles and sulci are mildly prominent commensurate with the patient's age. There is no abnormality on the diffusion-weighted images to suggest acute infarct. There is mild increased FLAIR and T2 signal in the periventricular white matter compatible with small vessel disease. There is no hemorrhage, mass or midline shift. There are no extra-axial fluid  collections. There are flow voids within the cavernous portions of the internal carotid arteries and basilar artery indicating patency. The corpus callosum, cerebellar tonsils, midline structures are in orbits are normal. IMPRESSION: Generalized relaxed with mild periventricular small vessel disease No acute intracranial process Electronically signed by:  Ally Sheriff MD  12/4/2022 3:31 PM CST Workstation: [id number]    X-Ray Chest AP Portable    Result Date: 12/12/2022  XR CHEST 1 VIEW CLINICAL HISTORY: 82 years Male Sepsis COMPARISON: 12/4/2022 FINDINGS: Cardiomediastinal silhouette is within normal limits. Lungs are normally expanded with no airspace consolidation. No pleural effusion or pneumothorax. No acute osseous abnormality. IMPRESSION: No acute pulmonary process. Electronically signed by:  Ally Sheriff MD  12/12/2022 6:45 PM CST Workstation: [id number]    X-Ray Chest AP Portable    Result Date: 12/4/2022  XR CHEST 1 VIEW CLINICAL HISTORY: 82 years Male ams COMPARISON: 11/27/2017 FINDINGS: Cardiomediastinal silhouette is within normal limits. Lungs are normally expanded with no airspace consolidation. No pleural effusion or pneumothorax. No acute osseous abnormality. IMPRESSION: No acute pulmonary process. Electronically signed by:  Ally Sheriff MD  12/4/2022 3:16 PM CST Workstation: [id number]    CT HEAD FOR STROKE    Result Date: 12/4/2022  CMS MANDATED QUALITY DATA-CT RADIATION DOSE-436 All CT scans at this facility dose modulation, iterative reconstruction, and or weight-based dosing when appropriate to reduce radiation dose to as low as reasonably achievable. HISTORY: Neuro deficit, acute, stroke suspected FINDINGS: Comparison to head CT of 12/20/2015. There is no acute intracranial hemorrhage, with no mass effect or abnormal extra-axial fluid. Scattered areas of nonspecific hypoattenuation involve the subcortical and deep periventricular white matter, with gray-white differentiation  maintained. There is stable generalized prominence of the cortical sulci and ventricles. The cerebellum and brainstem are unremarkable. There are carotid siphon vascular calcifications. The visualized paranasal sinuses and mastoid air cells are clear. There is no acute osseous abnormality. IMPRESSION: 1. No acute intracranial hemorrhage, mass effect, or loss of gray-white differentiation. 2. Chronic microvascular ischemic changes and generalized cerebral atrophy. Electronically signed by:  Judson Ty MD  12/4/2022 9:06 AM [facility] Workstation: [id number]    Echo Saline Bubble? Yes    Result Date: 12/5/2022  · There is no evidence of intracardiac shunting. · The left ventricle is normal in size with mild concentric hypertrophy and normal systolic function. · The estimated ejection fraction is 58%. · Normal left ventricular diastolic function. · Normal right ventricular size with normal right ventricular systolic function. · Mild mitral regurgitation. · Normal central venous pressure (3 mmHg).      CTA Head and Neck (xpd)    Result Date: 12/4/2022  CMS MANDATED QUALITY DATA-CT RADIATION DOSE-436, CAROTID-195 All CT scans at this facility use dose modulation, iterative reconstruction, and or weight based dosing when appropriate, to reduce radiation dose to as low as reasonably achievable. NASCET criteria were utilized for evaluation of carotid arterial stenosis. HISTORY: Neuro deficit, acute, stroke suspected FINDINGS: Thin axial imaging through the head and neck was performed with 100 mL Omnipaque 350 IV contrast, with sagittal and coronal reformatted images and MIP reconstructions performed, and images stored in the patient's permanent electronic medical record. Comparison to prior exams. CTA HEAD: The distal cervical, petrous, cavernous and supraclinoid segments of the internal carotid arteries are widely patent, with mild calcified plaque of the carotid siphons. The distal intracranial segments of the vertebral  arteries and basilar artery are widely patent. The bilateral anterior, middle and posterior cerebral arteries are widely patent and taper appropriately, with no intracranial aneurysm or vascular malformation. The anterior communicating artery is patent. The visualized dural venous sinuses enhance normally. There is no enhancing intracranial mass. CTA NECK:  Scattered calcified plaque involves the aortic arch, with the arch and arch vessel origins widely patent. Both visualized subclavian arteries are widely patent, with the common carotid arteries widely patent. There is mild calcified and soft plaque of the carotid bulbs, with the internal carotid arteries widely patent through the level of the skull base. The external carotid arteries and branches are patent. The vertebral arteries arise normally from the subclavian arteries, with the right vertebral artery dominant. There is no arterial dissection or aneurysm. The superior mediastinum and cervical soft tissues enhance normally. The visualized upper lungs show apical fibronodular scarring and are otherwise clear. There are no acute fractures or destructive osseous lesions, with intervertebral disc space narrowing in the spine. IMPRESSION: 1. Mild atheromatous plaque of the carotid bulbs, with widely patent carotid and vertebral arteries. 2. No significant abnormality of the intracranial arterial vasculature. Electronically signed by:  Judson Ty MD  12/4/2022 9:11 AM [facility] Workstation: [id number]

## 2022-12-13 NOTE — ASSESSMENT & PLAN NOTE
" Place in Observation,  consultation in ED, spoke with hospice company of POA's choice: "[facility]", who will be admitting patient in AM. Continuing home regimen for chronic illnesses, except increasing Seroquel to bid.    "

## 2022-12-13 NOTE — CARE UPDATE
12/13/22 0802   PRE-TX-O2   Device (Oxygen Therapy) room air   SpO2 96 %   Pulse Oximetry Type Intermittent   $ Pulse Oximetry - Multiple Charge Pulse Oximetry - Multiple   Pulse 74   Resp 17

## 2022-12-13 NOTE — PLAN OF CARE
Formerly [facility], later [facility]  Initial Discharge Assessment       Primary Care Provider: Jeyson Waldron MD    Admission Diagnosis: Dementia with behavioral problem [F03.918]    Admission Date: 12/12/2022  Expected Discharge Date:     Discharge Barriers Identified: None    Initial assessment completed with Taylor gonzalez. Caregiver is staying with patient to assist with 24/7 care. Patient and family are anticipating patient will discharge home on [facility] today.     Payor: [facility] MCARE / Plan: [facility] GROUP MEDICARE ADVANTAGE / Product Type: Medicare Advantage /     Extended Emergency Contact Information  Primary Emergency Contact: Taylor Metcalf  Address: [address] [place] [address] [place] [address] [facility]  Home Phone: [phone]  Mobile Phone: [phone]  Relation: Daughter  Secondary Emergency Contact: Alicia Griffiths   [place]  Mobile Phone: [phone]  Relation: Daughter    Discharge Plan A: Hospice/home  Discharge Plan B: Hospice/home      [facility] - [place] [id number] [address]  Phone: [phone] Fax: [phone]    [id number] - [place] - [address] [facility] [address] [facility]  [address]  [place] [address]  Phone: [phone] Fax: [phone]    [facility] ([facility] ) - [place], [place] - [address]  [address]  [address]  [facility] [address]  Phone: [phone] Fax: [phone]      Initial Assessment (most recent)       Adult Discharge Assessment - 12/13/22 1032          Discharge Assessment    Assessment Type Discharge Planning Assessment     Confirmed/corrected address, phone number and insurance Yes     Confirmed Demographics Correct on Facesheet     Source of Information family     When was your last doctors appointment? --   11/22    Does  patient/caregiver understand observation status Yes     Reason For Admission Dementia with behavioral changes     People in Home child(brynn), adult     Facility Arrived From: home     Do you expect to return to your current living situation? Yes     Do you have help at home or someone to help you manage your care at home? Yes     Who are your caregiver(s) and their phone number(s)? Taylor (daughter) [phone]     Walking or Climbing Stairs ambulation difficulty, requires equipment     Mobility Management walker, wheelchair     Dressing/Bathing bathing difficulty, assistance 1 person     Dressing/Bathing Management shower chair     Do you have any problems with: Needs other help     Specify other help Taylor (daughter) [phone]     Home Layout Able to live on 1st floor     Readmission within 30 days? No   OBS only    Patient currently being followed by outpatient case management? No     Do you currently have service(s) that help you manage your care at home? No     Do you take prescription medications? Yes     Do you have prescription coverage? Yes     Coverage [facility]     Do you have any problems affording any of your prescribed medications? No     Is the patient taking medications as prescribed? yes     Who is going to help you get home at discharge? Taylor (daughter) [phone]     How do you get to doctors appointments? family or friend will provide     Are you on dialysis? No     Do you take coumadin? No     Discharge Plan A Hospice/home     Discharge Plan B Hospice/home     DME Needed Upon Discharge  hospital bed;oxygen;nebulizer;wheelchair   Hospice delivering all DME today 12/13    Discharge Plan discussed with: Adult children     Discharge Barriers Identified None        Physical Activity    On average, how many days per week do you engage in moderate to strenuous exercise (like a brisk walk)? 0 days     On average, how many minutes do you engage in exercise at this level? 0 min        Financial  Resource Strain    How hard is it for you to pay for the very basics like food, housing, medical care, and heating? Not very hard        Housing Stability    In the last 12 months, was there a time when you were not able to pay the mortgage or rent on time? No     In the last 12 months, how many places have you lived? 1     In the last 12 months, was there a time when you did not have a steady place to sleep or slept in a shelter (including now)? No        Transportation Needs    In the past 12 months, has lack of transportation kept you from medical appointments or from getting medications? No     In the past 12 months, has lack of transportation kept you from meetings, work, or from getting things needed for daily living? No        Food Insecurity    Within the past 12 months, you worried that your food would run out before you got the money to buy more. Never true     Within the past 12 months, the food you bought just didn't last and you didn't have money to get more. Never true        Stress    Do you feel stress - tense, restless, nervous, or anxious, or unable to sleep at night because your mind is troubled all the time - these days? Only a little        Social Connections    In a typical week, how many times do you talk on the phone with family, friends, or neighbors? More than three times a week     How often do you get together with friends or relatives? More than three times a week     How often do you attend Faith or Christian services? Never     Do you belong to any clubs or organizations such as Faith groups, unions, fraternal or athletic groups, or school groups? No     How often do you attend meetings of the clubs or organizations you belong to? Never     Are you , , , , never , or living with a partner?         Alcohol Use    Q1: How often do you have a drink containing alcohol? Never     Q2: How many drinks containing alcohol do you have on a typical  day when you are drinking? Patient does not drink     Q3: How often do you have six or more drinks on one occasion? Never

## 2022-12-13 NOTE — PLAN OF CARE
Patient unable to sign MOON due to illness. Patients daughter, Taylor, reviewed SALES with CM and signed on behalf of patient.       12/13/22 1041   SALES Message   Medicare Outpatient and Observation Notification regarding financial responsibility Given to patient/caregiver;Explained to patient/caregiver;Signed/date by patient/caregiver   Date SALES was signed 12/13/22   Time SALES was signed 1037

## 2022-12-13 NOTE — PLAN OF CARE
Daughter, Taylor requesting [facility] care at [place] with [facility] for hosp[facility]. CM called [facility] intake to inquire about [facility] bed. Referral sent to Yol[facility] via [facility].

## 2022-12-13 NOTE — PROGRESS NOTES
"[facility] Medicine  Progress Note    Patient Name: Jarred Harrison  MRN: [id number]  Patient Class: OP- Observation   Admission Date: 12/12/2022  Length of Stay: 0 days  Attending Physician: Jami Andrew MD  Primary Care Provider: Jeyson Waldron MD        Subjective:     Principal Problem:Dementia with behavioral disturbance        HPI:  82 year old male with history of Dementia, Encephalopathy, CAD, HTN, GERD presented to ED with daughter stating that her father's dementia has worsened since being recently diagnosed with COVID. She would like for him to be placed into hospice. The patient has declined steadily and is no longer able to care for himself. Discussed with  in ED, who has contacted the Hospice company the daughter has selected and they will be trying to admit this evening or in AM.    In ED: labs reviewed and noted below: mild leukocytosis with minimal normocytic anemia; normal electrolytes and renal function; hepatic function is normal; lactate is normal; procalcitonin is not elevated. Flu is negative. UA: nitrite negative. CXR: NAPD.    Discussed with ED MD: Place in Observation,  consultation in ED, spoke with hospice company of A's choice: "[facility]", who will be admitting patient in AM. Continuing home regimen for chronic illnesses, except increasing Seroquel to bid.      Overview/Hospital Course:  Patient with a history of dementia with behavioral disturbance, COVID-19 (tested positive 12/04/22), hypertension, GERD, sick sinus syndrome who presented due to ongoing encephalopathy.  Patient with previous [facility] admission, 12/4/22 to 12/7/22, for encephalopathy, right facial droop and slurred speech, CVA workup with MRI negative.  During that admission did have behavioral disturbance, attempting to get out of bed, hypoactive delirium, did not respond well to Haldol as per report.  Previous echo with EF of 58%.  Discussed with daughter at " bedside, yes been restless, fidgeting, ongoing contusion since discharge and unable to manage at home, he was brought to the ED and initial plan was home with hospice but now family requesting nursing home placement with hospice.  They were previously planning moving to assisted living facility before this decline.  On presentation was febrile to 102.3, subsequent temperature curve improved, procalcitonin 0.35, UA 2 WBC, blood cultures negative, chest x-ray clear, WBC count 16.  He was admitted to medical floor, monitoring, adjusting medication for hyperactive delirium, case management consulted.      Interval History:  See hospital course.  Patient is restless, fidgeting and remains hyperactive.  Daughter at bedside states ongoing, unable to care for patient at home, family now contemplating nursing home placement with hospice.  Febrile to 102.3 on presentation but afebrile since, on room air.  Labs with WBC 16, hemoglobin 11.6, procalcitonin 0.35, blood cultures no growth to date.  Discussed with daughter at bedside.  Discussed with nursing.  Discussed with case management.    Review of Systems   Unable to perform ROS: Dementia   Objective:     Vital Signs (Most Recent):  Temp:  (Patient refused) (12/13/22 1100)  Pulse: 74 (12/13/22 0802)  Resp: 17 (12/13/22 0802)  BP: (!) 142/59 (12/13/22 0759)  SpO2: 96 % (12/13/22 0802)   Vital Signs (24h Range):  Temp:  [98.1 °F (36.7 °C)-102.3 °F (39.1 °C)] 98.5 °F (36.9 °C)  Pulse:  [67-97] 74  Resp:  [17-18] 17  SpO2:  [94 %-99 %] 96 %  BP: (136-158)/(59-75) 142/59     Weight: 57.5 kg (126 lb 12.2 oz)  Body mass index is 19.27 kg/m².    Intake/Output Summary (Last 24 hours) at 12/13/2022 1439  Last data filed at 12/12/2022 2344  Gross per 24 hour   Intake 1828 ml   Output 350 ml   Net 1478 ml      Physical Exam  Vitals and nursing note reviewed.   Constitutional:       Comments: Elderly male, chronically ill-appearing, no apparent acute distress   HENT:      Head:  Normocephalic and atraumatic.      Ears:      Comments: Hard of hearing     Mouth/Throat:      Mouth: Mucous membranes are moist.   Cardiovascular:      Rate and Rhythm: Normal rate and regular rhythm.   Pulmonary:      Comments: On room air, no wheeze or accessory muscle use  Genitourinary:     Comments: Wearing diaper  Neurological:      Mental Status: He is alert.      Comments: Able to state his name, moving all 4 extremities   Psychiatric:      Comments: Constantly pulling at his diaper, constantly trying to stand up, get out of bed, restless and fidgeting, CAM +ve       Significant Labs: BMP:   Recent Labs   Lab 12/12/22  1646   *      K 4.2      CO2 23   BUN 22   CREATININE 1.1   CALCIUM 9.2     CBC:   Recent Labs   Lab 12/12/22  1646   WBC 16.10*   HGB 11.6*   HCT 34.5*   *     CMP:   Recent Labs   Lab 12/12/22  1646      K 4.2      CO2 23   *   BUN 22   CREATININE 1.1   CALCIUM 9.2   PROT 7.2   ALBUMIN 4.1   BILITOT 1.0   ALKPHOS 42*   AST 29   ALT 21   ANIONGAP 9     Cardiac Markers: No results for input(s): CKMB, MYOGLOBIN, BNP, TROPISTAT in the last 48 hours.  Lactic Acid:   Recent Labs   Lab 12/12/22  1646 12/12/22  2303   LACTATE 1.6 1.6     Magnesium: No results for input(s): MG in the last 48 hours.  POCT Glucose: No results for input(s): POCTGLUCOSE in the last 48 hours.  Troponin: No results for input(s): TROPONINI, TROPONINIHS in the last 48 hours.  TSH:   Recent Labs   Lab 12/04/22  0835   TSH 2.470     Urine Culture: No results for input(s): LABURIN in the last 48 hours.  Urine Studies:   Recent Labs   Lab 12/12/22  1710   COLORU Yellow   APPEARANCEUA Clear   PHUR 8.0   SPECGRAV 1.020   PROTEINUA 1+*   GLUCUA Negative   KETONESU 2+*   BILIRUBINUA Negative   OCCULTUA 3+*   NITRITE Negative   UROBILINOGEN 2.0-3.0*   LEUKOCYTESUR Negative   RBCUA 83*   WBCUA 2   BACTERIA Negative   SQUAMEPITHEL 9   HYALINECASTS 7*       Significant Imaging: I have  reviewed all pertinent imaging results/findings within the past 24 hours.    MRI Brain Without Contrast    Result Date: 12/4/2022  MRI of the brain Clinical history is TIA. COMPARISON: Head CT at 8:39 AM Multiplanar images of the brain were obtained. The ventricles and sulci are mildly prominent commensurate with the patient's age. There is no abnormality on the diffusion-weighted images to suggest acute infarct. There is mild increased FLAIR and T2 signal in the periventricular white matter compatible with small vessel disease. There is no hemorrhage, mass or midline shift. There are no extra-axial fluid collections. There are flow voids within the cavernous portions of the internal carotid arteries and basilar artery indicating patency. The corpus callosum, cerebellar tonsils, midline structures are in orbits are normal. IMPRESSION: Generalized relaxed with mild periventricular small vessel disease No acute intracranial process Electronically signed by:  Ally Sheriff MD  12/4/2022 3:31 PM CST Workstation: [id number]    X-Ray Chest AP Portable    Result Date: 12/12/2022  XR CHEST 1 VIEW CLINICAL HISTORY: 82 years Male Sepsis COMPARISON: 12/4/2022 FINDINGS: Cardiomediastinal silhouette is within normal limits. Lungs are normally expanded with no airspace consolidation. No pleural effusion or pneumothorax. No acute osseous abnormality. IMPRESSION: No acute pulmonary process. Electronically signed by:  Ally Sheriff MD  12/12/2022 6:45 PM CST Workstation: [id number]    X-Ray Chest AP Portable    Result Date: 12/4/2022  XR CHEST 1 VIEW CLINICAL HISTORY: 82 years Male ams COMPARISON: 11/27/2017 FINDINGS: Cardiomediastinal silhouette is within normal limits. Lungs are normally expanded with no airspace consolidation. No pleural effusion or pneumothorax. No acute osseous abnormality. IMPRESSION: No acute pulmonary process. Electronically signed by:  Ally Sheriff MD  12/4/2022 3:16 PM CST Workstation:  [id number]    CT HEAD FOR STROKE    Result Date: 12/4/2022  CMS MANDATED QUALITY DATA-CT RADIATION DOSE-436 All CT scans at this facility dose modulation, iterative reconstruction, and or weight-based dosing when appropriate to reduce radiation dose to as low as reasonably achievable. HISTORY: Neuro deficit, acute, stroke suspected FINDINGS: Comparison to head CT of 12/20/2015. There is no acute intracranial hemorrhage, with no mass effect or abnormal extra-axial fluid. Scattered areas of nonspecific hypoattenuation involve the subcortical and deep periventricular white matter, with gray-white differentiation maintained. There is stable generalized prominence of the cortical sulci and ventricles. The cerebellum and brainstem are unremarkable. There are carotid siphon vascular calcifications. The visualized paranasal sinuses and mastoid air cells are clear. There is no acute osseous abnormality. IMPRESSION: 1. No acute intracranial hemorrhage, mass effect, or loss of gray-white differentiation. 2. Chronic microvascular ischemic changes and generalized cerebral atrophy. Electronically signed by:  Judson Ty MD  12/4/2022 9:06 AM CST Workstation: [id number]    Echo Saline Bubble? Yes    Result Date: 12/5/2022  · There is no evidence of intracardiac shunting. · The left ventricle is normal in size with mild concentric hypertrophy and normal systolic function. · The estimated ejection fraction is 58%. · Normal left ventricular diastolic function. · Normal right ventricular size with normal right ventricular systolic function. · Mild mitral regurgitation. · Normal central venous pressure (3 mmHg).      CTA Head and Neck (xpd)    Result Date: 12/4/2022  CMS MANDATED QUALITY DATA-CT RADIATION DOSE-436, CAROTID-195 All CT scans at this facility use dose modulation, iterative reconstruction, and or weight based dosing when appropriate, to reduce radiation dose to as low as reasonably achievable. NASCET criteria were  utilized for evaluation of carotid arterial stenosis. HISTORY: Neuro deficit, acute, stroke suspected FINDINGS: Thin axial imaging through the head and neck was performed with 100 mL Omnipaque 350 IV contrast, with sagittal and coronal reformatted images and MIP reconstructions performed, and images stored in the patient's permanent electronic medical record. Comparison to prior exams. CTA HEAD: The distal cervical, petrous, cavernous and supraclinoid segments of the internal carotid arteries are widely patent, with mild calcified plaque of the carotid siphons. The distal intracranial segments of the vertebral arteries and basilar artery are widely patent. The bilateral anterior, middle and posterior cerebral arteries are widely patent and taper appropriately, with no intracranial aneurysm or vascular malformation. The anterior communicating artery is patent. The visualized dural venous sinuses enhance normally. There is no enhancing intracranial mass. CTA NECK:  Scattered calcified plaque involves the aortic arch, with the arch and arch vessel origins widely patent. Both visualized subclavian arteries are widely patent, with the common carotid arteries widely patent. There is mild calcified and soft plaque of the carotid bulbs, with the internal carotid arteries widely patent through the level of the skull base. The external carotid arteries and branches are patent. The vertebral arteries arise normally from the subclavian arteries, with the right vertebral artery dominant. There is no arterial dissection or aneurysm. The superior mediastinum and cervical soft tissues enhance normally. The visualized upper lungs show apical fibronodular scarring and are otherwise clear. There are no acute fractures or destructive osseous lesions, with intervertebral disc space narrowing in the spine. IMPRESSION: 1. Mild atheromatous plaque of the carotid bulbs, with widely patent carotid and vertebral arteries. 2. No significant  abnormality of the intracranial arterial vasculature. Electronically signed by:  Judson Ty MD  12/4/2022 9:11 AM [facility] Workstation: [id number]         Assessment/Plan:      Active Hospital Problems    Diagnosis    *Dementia with behavioral disturbance    Fever    Leucocytosis    Normochromic normocytic anemia    Late onset Alzheimer's dementia with behavioral disturbance    Sick sinus syndrome    H/O heart artery stent    Hearing loss    Gastro-esophageal reflux disease without esophagitis     Plan:  Continue care on medical floor  Initially tested positive for COVID on 12/04, no symptoms, discontinue COVID isolation   WBC count elevated and did have isolated fever, procalcitonin 0.35, no definite source of infection, monitoring clinically off antimicrobials   Still restless and agitated, unfortunately removed IV, trial of Geodon IM, monitor and adjust as needed   Fall and delirium precautions   A.m. labs for trending/WBC follow-up   Oral diet, regular, as tolerated   Case management following for discharge planning, plan is hospice at facility   Updated daughter at bedside   Further plan as per clinical course      VTE Risk Mitigation (From admission, onward)         Ordered     enoxaparin injection 40 mg  Daily         12/12/22 2207     IP VTE HIGH RISK PATIENT  Once         12/12/22 2207     Place sequential compression device  Until discontinued         12/12/22 2207                Discharge Planning   GOMEZ: 12/13/2022     Code Status: DNR   Is the patient medically ready for discharge?:     Reason for patient still in hospital (select all that apply): Other (specify) post acute set up  Discharge Plan A: Hospice/home                  Jami Andrew MD  Department of Hospital Medicine   [facility]

## 2022-12-13 NOTE — ED NOTES
REMAINS ALERT.  NO DISTRESS AT REST. REPOSITIONED IN BED. FALLS PRECAUTIONS.  FAMILY AT BS.  CT REPORTS PT SEEKS MD TO BS PRIOR TO CT SCAN.

## 2022-12-13 NOTE — HOSPITAL COURSE
Patient with a history of dementia with behavioral disturbance with recent worsening, wife passed away about 3 months ago, COVID-19 (tested positive 12/04/22), hypertension, GERD, hypotension, on midodrine, sick sinus syndrome who presented due to ongoing encephalopathy.  Patient with previous [facility] admission, 12/4/22 to 12/7/22, for encephalopathy, right facial droop and slurred speech, CVA workup with MRI negative.  During that admission did have behavioral disturbance, attempting to get out of bed, hyperactive delirium, did not respond well to Haldol as per report.  Previous echo with EF of 58%.  Discussed with daughter at bedside, continues to be restless, fidgeting, ongoing confusion since discharge and unable to manage at home, he was brought to the ED and initial plan was home with hospice but now family requesting facility placement with hospice.  They were previously planning moving to assisted living facility before this decline.  On presentation was febrile to 102.3, subsequent temperature curve improved, procalcitonin 0.35, UA 2 WBC, blood cultures negative, chest x-ray clear, WBC count 16.  He was admitted to medical floor, monitoring, started on Geodon for hyperactive delirium with improvement.  No evidence of ongoing infection, remained afebrile, WBC count normalized, off antimicrobial therapy.  On 12/15 no complaints, doing better, plan is for discharge to [facility] assisted living facility/memory unit, with [facility].  Discussed with son-in-law at bedside on day of discharge.  Discharge plan was reviewed, no questions or concerns.  Discussed with nursing and case management on day of discharge.      Discharge examination   Sitting in bed, hard of hearing, alert, on room air, regular heart rhythm

## 2022-12-13 NOTE — H&P
"[facility] - Emergency Dept  Hospital Medicine  History & Physical    Patient Name: Jarred Harrison  MRN: [id number]  Patient Class: OP- Observation  Admission Date: 12/12/2022  Attending Physician: Andrea Garnett MD  Primary Care Provider: Jeyson Waldron MD         Patient information was obtained from relative(s), past medical records, ER records and ED MD.     Subjective:     Principal Problem:Dementia with behavioral disturbance    Chief Complaint:   Chief Complaint   Patient presents with    Altered Mental Status    Fever     X 1 WEEK        HPI: 82 year old male with history of Dementia, Encephalopathy, CAD, HTN, GERD presented to ED with daughter stating that her father's dementia has worsened since being recently diagnosed with COVID. She would like for him to be placed into hospice. The patient has declined steadily and is no longer able to care for himself. Discussed with  in ED, who has contacted the Hospice company the daughter has selected and they will be trying to admit this evening or in AM.    In ED: labs reviewed and noted below: mild leukocytosis with minimal normocytic anemia; normal electrolytes and renal function; hepatic function is normal; lactate is normal; procalcitonin is not elevated. Flu is negative. UA: nitrite negative. CXR: NAPD.    Discussed with ED MD: Place in Observation,  consultation in ED, spoke with hospice company of A's choice: "[facility]", who will be admitting patient in AM. Continuing home regimen for chronic illnesses, except increasing Seroquel to bid.      Past Medical History:   Diagnosis Date    AI (aortic insufficiency)     Coronary artery disease     GERD (gastroesophageal reflux disease)     Hyperlipidemia     Hypertension     LBBB (left bundle branch block)     Squamous cell carcinoma in situ (SCCIS) of scalp     SSS (sick sinus syndrome)        Past Surgical History:   Procedure Laterality Date    CARDIAC " CATHETERIZATION      CATARACT EXTRACTION      CORONARY ANGIOPLASTY  08/29/72885    CORONARY STENT PLACEMENT  2015    Dr Parr    EPIDURAL STEROID INJECTION N/A 7/29/2022    Procedure: Injection, Steroid, Epidural;  Surgeon: Janes Woodson MD;  Location: On license of [facility] OR;  Service: Pain Management;  Laterality: N/A;  L5-s1     FOOT SURGERY Right     KNEE SURGERY Left     right elbow         Review of patient's allergies indicates:   Allergen Reactions    Hydrocodone        No current facility-administered medications on file prior to encounter.     Current Outpatient Medications on File Prior to Encounter   Medication Sig    aspirin (ECOTRIN) 81 MG EC tablet Take 81 mg by mouth once daily.    EScitalopram oxalate (LEXAPRO) 10 MG tablet Take 1 tablet (10 mg total) by mouth once daily.    ezetimibe (ZETIA) 10 mg tablet Take 1 tablet (10 mg total) by mouth once daily.    fludrocortisone (FLORINEF) 0.1 mg Tab TAKE 1 TABLET BY MOUTH ONCE DAILY (Patient taking differently: Take 100 mcg by mouth once daily.)    latanoprost 0.005 % ophthalmic solution Place 1 drop into both eyes every evening.    midodrine (PROAMATINE) 2.5 MG Tab Take 1 tablet (2.5 mg total) by mouth 3 (three) times daily with meals.    multivitamin capsule Take 1 capsule by mouth once daily.    pantoprazole (PROTONIX) 40 MG tablet Take 1 tablet (40 mg total) by mouth once daily.    QUEtiapine (SEROQUEL) 25 MG Tab Take 1 tablet (25 mg total) by mouth before dinner.    nitroGLYCERIN (NITROSTAT) 0.4 MG SL tablet Place 1 tablet (0.4 mg total) under the tongue every 5 (five) minutes as needed for Chest pain.     Family History    None       Tobacco Use    Smoking status: Never    Smokeless tobacco: Never   Substance and Sexual Activity    Alcohol use: Not Currently    Drug use: Not on file    Sexual activity: Not on file     Review of Systems   Unable to perform ROS: Dementia   Objective:     Vital Signs (Most Recent):  Temp: 98.5 °F (36.9 °C)  (12/12/22 1951)  Pulse: 97 (12/12/22 1703)  Resp: 18 (12/12/22 1633)  BP: (!) 144/60 (12/12/22 1703)  SpO2: 95 % (12/12/22 1703)   Vital Signs (24h Range):  Temp:  [98.5 °F (36.9 °C)-102.3 °F (39.1 °C)] 98.5 °F (36.9 °C)  Pulse:  [88-97] 97  Resp:  [18] 18  SpO2:  [94 %-95 %] 95 %  BP: (144-158)/(60-69) 144/60     Weight: 57.6 kg (127 lb)  Body mass index is 19.31 kg/m².    Physical Exam  Vitals and nursing note reviewed.   Constitutional:       Appearance: He is well-developed.   HENT:      Head: Normocephalic and atraumatic.      Right Ear: External ear normal.      Left Ear: External ear normal.      Nose: Nose normal.   Eyes:      Conjunctiva/sclera: Conjunctivae normal.      Pupils: Pupils are equal, round, and reactive to light.   Cardiovascular:      Rate and Rhythm: Normal rate and regular rhythm.      Heart sounds: Normal heart sounds.   Pulmonary:      Effort: Pulmonary effort is normal.      Breath sounds: Normal breath sounds.   Abdominal:      General: Bowel sounds are normal.      Palpations: Abdomen is soft.   Musculoskeletal:         General: Normal range of motion.      Cervical back: Normal range of motion and neck supple.   Skin:     General: Skin is warm and dry.      Capillary Refill: Capillary refill takes less than 2 seconds.   Neurological:      Mental Status: He is alert.      Comments: Oriented to self only  CN 2-12 grossly intact by inspection  Power: moves all extremities against gravity   Psychiatric:      Comments: Unable to assess secondary to significant dementia         CRANIAL NERVES     CN III, IV, VI   Pupils are equal, round, and reactive to light.     Significant Labs: All pertinent labs within the past 24 hours have been reviewed.  CBC:   Recent Labs   Lab 12/12/22  1646   WBC 16.10*   HGB 11.6*   HCT 34.5*   *     CMP:   Recent Labs   Lab 12/12/22  1646      K 4.2      CO2 23   *   BUN 22   CREATININE 1.1   CALCIUM 9.2   PROT 7.2   ALBUMIN 4.1  "  BILITOT 1.0   ALKPHOS 42*   AST 29   ALT 21   ANIONGAP 9     Cardiac Markers: No results for input(s): CKMB, MYOGLOBIN, BNP, TROPISTAT in the last 48 hours.  Troponin: No results for input(s): TROPONINI, TROPONINIHS in the last 48 hours.  Urine Studies:   Recent Labs   Lab 12/12/22  1710   COLORU Yellow   APPEARANCEUA Clear   PHUR 8.0   SPECGRAV 1.020   PROTEINUA 1+*   GLUCUA Negative   KETONESU 2+*   BILIRUBINUA Negative   OCCULTUA 3+*   NITRITE Negative   UROBILINOGEN 2.0-3.0*   LEUKOCYTESUR Negative   RBCUA 83*   WBCUA 2   BACTERIA Negative   SQUAMEPITHEL 9   HYALINECASTS 7*       Significant Imaging: I have reviewed all pertinent imaging results/findings within the past 24 hours.    Assessment/Plan:     * Dementia with behavioral disturbance   Place in Observation,  consultation in ED, spoke with hospice company of POA's choice: "[facility]", who will be admitting patient in AM. Continuing home regimen for chronic illnesses, except increasing Seroquel to bid.      Encephalopathy due to COVID-19 virus   Place in Observation,  consultation in ED, spoke with hospice company of POA's choice: "[facility]", who will be admitting patient in AM. Continuing home regimen for chronic illnesses, except increasing Seroquel to bid.        VTE Risk Mitigation (From admission, onward)    None             Andrea Garnett MD  Department of Hospital Medicine   [facility] - Emergency Dept  "

## 2022-12-13 NOTE — PLAN OF CARE
Pt's daughter, Taylor Metcalf  (POA), would like pt admitted to Hospice.  Pt has dementia and is confused.  Spoke with daughter at bedside, who states she would like to use [facility].  [facility] called to see if pt could be admitted directly from Er to Hospice.  On call nurse, Tiat, is calling her boss and is to call back.

## 2022-12-13 NOTE — SUBJECTIVE & OBJECTIVE
Past Medical History:   Diagnosis Date    AI (aortic insufficiency)     Coronary artery disease     GERD (gastroesophageal reflux disease)     Hyperlipidemia     Hypertension     LBBB (left bundle branch block)     Squamous cell carcinoma in situ (SCCIS) of scalp     SSS (sick sinus syndrome)        Past Surgical History:   Procedure Laterality Date    CARDIAC CATHETERIZATION      CATARACT EXTRACTION      CORONARY ANGIOPLASTY  08/29/82015    CORONARY STENT PLACEMENT  2015    Dr Parr    EPIDURAL STEROID INJECTION N/A 7/29/2022    Procedure: Injection, Steroid, Epidural;  Surgeon: Janes Woodson MD;  Location: [facility] OR;  Service: Pain Management;  Laterality: N/A;  L5-s1     FOOT SURGERY Right     KNEE SURGERY Left     right elbow         Review of patient's allergies indicates:   Allergen Reactions    Hydrocodone        No current facility-administered medications on file prior to encounter.     Current Outpatient Medications on File Prior to Encounter   Medication Sig    aspirin (ECOTRIN) 81 MG EC tablet Take 81 mg by mouth once daily.    EScitalopram oxalate (LEXAPRO) 10 MG tablet Take 1 tablet (10 mg total) by mouth once daily.    ezetimibe (ZETIA) 10 mg tablet Take 1 tablet (10 mg total) by mouth once daily.    fludrocortisone (FLORINEF) 0.1 mg Tab TAKE 1 TABLET BY MOUTH ONCE DAILY (Patient taking differently: Take 100 mcg by mouth once daily.)    latanoprost 0.005 % ophthalmic solution Place 1 drop into both eyes every evening.    midodrine (PROAMATINE) 2.5 MG Tab Take 1 tablet (2.5 mg total) by mouth 3 (three) times daily with meals.    multivitamin capsule Take 1 capsule by mouth once daily.    pantoprazole (PROTONIX) 40 MG tablet Take 1 tablet (40 mg total) by mouth once daily.    QUEtiapine (SEROQUEL) 25 MG Tab Take 1 tablet (25 mg total) by mouth before dinner.    nitroGLYCERIN (NITROSTAT) 0.4 MG SL tablet Place 1 tablet (0.4 mg total) under the tongue every 5 (five) minutes as needed for Chest pain.      Family History    None       Tobacco Use    Smoking status: Never    Smokeless tobacco: Never   Substance and Sexual Activity    Alcohol use: Not Currently    Drug use: Not on file    Sexual activity: Not on file     Review of Systems   Unable to perform ROS: Dementia   Objective:     Vital Signs (Most Recent):  Temp: 98.5 °F (36.9 °C) (12/12/22 1951)  Pulse: 97 (12/12/22 1703)  Resp: 18 (12/12/22 1633)  BP: (!) 144/60 (12/12/22 1703)  SpO2: 95 % (12/12/22 1703)   Vital Signs (24h Range):  Temp:  [98.5 °F (36.9 °C)-102.3 °F (39.1 °C)] 98.5 °F (36.9 °C)  Pulse:  [88-97] 97  Resp:  [18] 18  SpO2:  [94 %-95 %] 95 %  BP: (144-158)/(60-69) 144/60     Weight: 57.6 kg (127 lb)  Body mass index is 19.31 kg/m².    Physical Exam  Vitals and nursing note reviewed.   Constitutional:       Appearance: He is well-developed.   HENT:      Head: Normocephalic and atraumatic.      Right Ear: External ear normal.      Left Ear: External ear normal.      Nose: Nose normal.   Eyes:      Conjunctiva/sclera: Conjunctivae normal.      Pupils: Pupils are equal, round, and reactive to light.   Cardiovascular:      Rate and Rhythm: Normal rate and regular rhythm.      Heart sounds: Normal heart sounds.   Pulmonary:      Effort: Pulmonary effort is normal.      Breath sounds: Normal breath sounds.   Abdominal:      General: Bowel sounds are normal.      Palpations: Abdomen is soft.   Musculoskeletal:         General: Normal range of motion.      Cervical back: Normal range of motion and neck supple.   Skin:     General: Skin is warm and dry.      Capillary Refill: Capillary refill takes less than 2 seconds.   Neurological:      Mental Status: He is alert.      Comments: Oriented to self only  CN 2-12 grossly intact by inspection  Power: moves all extremities against gravity   Psychiatric:      Comments: Unable to assess secondary to significant dementia         CRANIAL NERVES     CN III, IV, VI   Pupils are equal, round, and reactive to  light.     Significant Labs: All pertinent labs within the past 24 hours have been reviewed.  CBC:   Recent Labs   Lab 12/12/22  1646   WBC 16.10*   HGB 11.6*   HCT 34.5*   *     CMP:   Recent Labs   Lab 12/12/22  1646      K 4.2      CO2 23   *   BUN 22   CREATININE 1.1   CALCIUM 9.2   PROT 7.2   ALBUMIN 4.1   BILITOT 1.0   ALKPHOS 42*   AST 29   ALT 21   ANIONGAP 9     Cardiac Markers: No results for input(s): CKMB, MYOGLOBIN, BNP, TROPISTAT in the last 48 hours.  Troponin: No results for input(s): TROPONINI, TROPONINIHS in the last 48 hours.  Urine Studies:   Recent Labs   Lab 12/12/22  1710   COLORU Yellow   APPEARANCEUA Clear   PHUR 8.0   SPECGRAV 1.020   PROTEINUA 1+*   GLUCUA Negative   KETONESU 2+*   BILIRUBINUA Negative   OCCULTUA 3+*   NITRITE Negative   UROBILINOGEN 2.0-3.0*   LEUKOCYTESUR Negative   RBCUA 83*   WBCUA 2   BACTERIA Negative   SQUAMEPITHEL 9   HYALINECASTS 7*       Significant Imaging: I have reviewed all pertinent imaging results/findings within the past 24 hours.

## 2022-12-13 NOTE — ED PROVIDER NOTES
Encounter Date: 12/12/2022       History     Chief Complaint   Patient presents with    Altered Mental Status    Fever     X 1 WEEK     Patient presents complaining of altered mental status and fever.  Patient's history of dementia.  Recent admission into the hospital with COVID encephalopathy.  Patient again having fever today and change in mental status.  At the worst symptoms are moderate.  Patient has had this before.  Nothing really makes it better or worse.    Review of patient's allergies indicates:   Allergen Reactions    Hydrocodone      Past Medical History:   Diagnosis Date    AI (aortic insufficiency)     Coronary artery disease     GERD (gastroesophageal reflux disease)     Hyperlipidemia     Hypertension     LBBB (left bundle branch block)     Squamous cell carcinoma in situ (SCCIS) of scalp     SSS (sick sinus syndrome)      Past Surgical History:   Procedure Laterality Date    CARDIAC CATHETERIZATION      CATARACT EXTRACTION      CORONARY ANGIOPLASTY  08/29/82015    CORONARY STENT PLACEMENT  2015    Dr Parr    EPIDURAL STEROID INJECTION N/A 7/29/2022    Procedure: Injection, Steroid, Epidural;  Surgeon: Janes Woodson MD;  Location: [facility] OR;  Service: Pain Management;  Laterality: N/A;  L5-s1     FOOT SURGERY Right     KNEE SURGERY Left     right elbow       No family history on file.  Social History     Tobacco Use    Smoking status: Never    Smokeless tobacco: Never   Substance Use Topics    Alcohol use: Not Currently     Review of Systems   All other systems reviewed and are negative.    Physical Exam     Initial Vitals   BP Pulse Resp Temp SpO2   12/12/22 1633 12/12/22 1633 12/12/22 1633 12/12/22 1633 12/12/22 1638   (!) 158/69 88 18 (!) 102.3 °F (39.1 °C) (!) 94 %      MAP       --                Physical Exam    Nursing note and vitals reviewed.  Constitutional: He is not diaphoretic. No distress.     Weak, frail, confused   HENT:   Head: Normocephalic and atraumatic.   Eyes: EOM are normal.    Neck: Neck supple.   Normal range of motion.  Cardiovascular:  Normal rate, regular rhythm, normal heart sounds and intact distal pulses.           Pulmonary/Chest: Breath sounds normal. No respiratory distress.   Abdominal: Abdomen is soft.   Musculoskeletal:      Cervical back: Normal range of motion and neck supple.     Neurological: He is alert.   Patient is awake but does not obey commands.  He talks but makes nonsensical sentences.   Skin: Skin is warm and dry.   Psychiatric: He has a normal mood and affect. His behavior is normal. Judgment and thought content normal.       ED Course   Procedures  Labs Reviewed   CBC W/ AUTO DIFFERENTIAL - Abnormal; Notable for the following components:       Result Value    WBC 16.10 (*)     RBC 3.57 (*)     Hemoglobin 11.6 (*)     Hematocrit 34.5 (*)     MCH 32.5 (*)     Platelets 128 (*)     Immature Granulocytes 1.4 (*)     Gran # (ANC) 14.8 (*)     Immature Grans (Abs) 0.23 (*)     Lymph # 0.3 (*)     Gran % 91.9 (*)     Lymph % 1.9 (*)     All other components within normal limits   COMPREHENSIVE METABOLIC PANEL - Abnormal; Notable for the following components:    Glucose 157 (*)     Alkaline Phosphatase 42 (*)     All other components within normal limits   URINALYSIS, REFLEX TO URINE CULTURE - Abnormal; Notable for the following components:    Protein, UA 1+ (*)     Ketones, UA 2+ (*)     Occult Blood UA 3+ (*)     Urobilinogen, UA 2.0-3.0 (*)     All other components within normal limits    Narrative:     Specimen Source->Urine   URINALYSIS MICROSCOPIC - Abnormal; Notable for the following components:    RBC, UA 83 (*)     Hyaline Casts, UA 7 (*)     All other components within normal limits    Narrative:     Specimen Source->Urine   POCT GLUCOSE - Abnormal; Notable for the following components:    POC Glucose 155 (*)     All other components within normal limits   CULTURE, BLOOD   CULTURE, BLOOD   LACTIC ACID, PLASMA   PROCALCITONIN   INFLUENZA A AND B ANTIGEN     Narrative:     Specimen Source->Nasopharyngeal Swab   LACTIC ACID, PLASMA   ISTAT CREATININE        ECG Results              EKG 12-lead (Final result)  Result time 12/12/22 17:57:23      Final result by Interface, Lab In [facility] (12/12/22 17:57:23)                   Narrative:    Test Reason : A41.9,    Vent. Rate : 093 BPM     Atrial Rate : 093 BPM     P-R Int : 156 ms          QRS Dur : 142 ms      QT Int : 398 ms       P-R-T Axes : 059 018 128 degrees     QTc Int : 494 ms    Sinus rhythm with Premature atrial complexes  Left bundle branch block  Abnormal ECG  When compared with ECG of 04-DEC-2022 09:09,  T wave inversion more evident in Lateral leads  Confirmed by Jaguar Ibarra MD ([id number]) on 12/12/2022 5:57:08 PM    Referred By:             Confirmed By:Jaguar Ibarra MD                                  Imaging Results              X-Ray Chest AP Portable (Final result)  Result time 12/12/22 18:45:49      Final result by Ally Sheriff MD (12/12/22 18:45:49)                   Narrative:    XR CHEST 1 VIEW    CLINICAL HISTORY:  82 years Male Sepsis    COMPARISON: 12/4/2022    FINDINGS: Cardiomediastinal silhouette is within normal limits. Lungs are normally expanded with no airspace consolidation. No pleural effusion or pneumothorax. No acute osseous abnormality.    IMPRESSION: No acute pulmonary process.    Electronically signed by:  Ally Sheriff MD  12/12/2022 6:45 PM CST Workstation: [id number]                                     Medications   lactated ringers bolus 1,728 mL (0 mLs Intravenous Stopped 12/12/22 2040)   piperacillin-tazobactam 4.5 g in dextrose 5 % 100 mL IVPB (ready to mix system) (0 g Intravenous Stopped 12/12/22 2046)   acetaminophen tablet 650 mg (650 mg Oral Given 12/12/22 1749)     Medical Decision Making:   Initial Assessment:   Patient is febrile but in no distress  Differential Diagnosis:   Considerations include but are not limited to sepsis, bacteremia, long COVID,  electrolyte abnormalities, pneumonia, acute kidney injury, dehydration  Clinical Tests:   Lab Tests: Reviewed and Ordered  Radiological Study: Ordered and Reviewed  Medical Tests: Reviewed and Ordered  ED Management:  Patient was started on broad-spectrum antibiotics from the emergency department.  Blood work was reviewed.  [place] count of 04[address].  No definitive source.  Chest x-ray appears clear urine appears clear.  Patient consulted to Hospital Medicine secondary to possible bacteremia.  He remains clinically stable.                        Clinical Impression:   Final diagnoses:  [A41.9] Sepsis  [R50.9] Fever, unspecified fever cause (Primary)  [R41.82] Altered mental status, unspecified altered mental status type  [F03.918] Dementia with behavioral problem        ED Disposition Condition    Observation                 Prosper Serna MD  12/12/22 7614

## 2022-12-13 NOTE — PLAN OF CARE
Nurse Rivers at [facility] stated they will admit patient in the morning to hospice.  Dr. Garnett aware and will admit pt to hospital overnight.  Faxed H & P, labs, face sheet, order for hospice, and latest discharge summary to [facility] at [phone].  Hospice nurse Tita called and spoke with pt's daughter via phone.

## 2022-12-13 NOTE — HPI
"82 year old male with history of Dementia, Encephalopathy, CAD, HTN, GERD presented to ED with daughter stating that her father's dementia has worsened since being recently diagnosed with COVID. She would like for him to be placed into hospice. The patient has declined steadily and is no longer able to care for himself. Discussed with  in ED, who has contacted the Hospice company the daughter has selected and they will be trying to admit this evening or in AM.    In ED: labs reviewed and noted below: mild leukocytosis with minimal normocytic anemia; normal electrolytes and renal function; hepatic function is normal; lactate is normal; procalcitonin is not elevated. Flu is negative. UA: nitrite negative. CXR: NAPD.    Discussed with ED MD: Place in Observation,  consultation in ED, spoke with hospice company of POA's choice: "[facility]", who will be admitting patient in AM. Continuing home regimen for chronic illnesses, except increasing Seroquel to bid.  "

## 2022-12-14 PROBLEM — D72.829 LEUCOCYTOSIS: Status: RESOLVED | Noted: 2022-12-13 | Resolved: 2022-12-14

## 2022-12-14 PROBLEM — R50.9 FEVER: Status: RESOLVED | Noted: 2022-12-13 | Resolved: 2022-12-14

## 2022-12-14 LAB
ANION GAP SERPL CALC-SCNC: 8 MMOL/L (ref 8–16)
BUN SERPL-MCNC: 16 MG/DL (ref 8–23)
CALCIUM SERPL-MCNC: 9.7 MG/DL (ref 8.7–10.5)
CHLORIDE SERPL-SCNC: 103 MMOL/L (ref 95–110)
CO2 SERPL-SCNC: 26 MMOL/L (ref 23–29)
CREAT SERPL-MCNC: 0.9 MG/DL (ref 0.5–1.4)
ERYTHROCYTE [DISTWIDTH] IN BLOOD BY AUTOMATED COUNT: 12.3 % (ref 11.5–14.5)
EST. GFR  (NO RACE VARIABLE): >60 ML/MIN/1.73 M^2
GLUCOSE SERPL-MCNC: 115 MG/DL (ref 70–110)
HCT VFR BLD AUTO: 34.3 % (ref 40–54)
HGB BLD-MCNC: 11.5 G/DL (ref 14–18)
MAGNESIUM SERPL-MCNC: 2.1 MG/DL (ref 1.6–2.6)
MCH RBC QN AUTO: 32.5 PG (ref 27–31)
MCHC RBC AUTO-ENTMCNC: 33.5 G/DL (ref 32–36)
MCV RBC AUTO: 97 FL (ref 82–98)
PHOSPHATE SERPL-MCNC: 2.3 MG/DL (ref 2.7–4.5)
PLATELET # BLD AUTO: 151 K/UL (ref 150–450)
PMV BLD AUTO: 10.1 FL (ref 9.2–12.9)
POTASSIUM SERPL-SCNC: 3.9 MMOL/L (ref 3.5–5.1)
RBC # BLD AUTO: 3.54 M/UL (ref 4.6–6.2)
SODIUM SERPL-SCNC: 137 MMOL/L (ref 136–145)
WBC # BLD AUTO: 10.16 K/UL (ref 3.9–12.7)

## 2022-12-14 PROCEDURE — 25000003 PHARM REV CODE 250: Performed by: INTERNAL MEDICINE

## 2022-12-14 PROCEDURE — 30200315 PPD INTRADERMAL TEST REV CODE 302: Performed by: INTERNAL MEDICINE

## 2022-12-14 PROCEDURE — 86580 TB INTRADERMAL TEST: CPT | Performed by: INTERNAL MEDICINE

## 2022-12-14 PROCEDURE — 85027 COMPLETE CBC AUTOMATED: CPT | Performed by: INTERNAL MEDICINE

## 2022-12-14 PROCEDURE — 36415 COLL VENOUS BLD VENIPUNCTURE: CPT | Performed by: INTERNAL MEDICINE

## 2022-12-14 PROCEDURE — 83735 ASSAY OF MAGNESIUM: CPT | Performed by: INTERNAL MEDICINE

## 2022-12-14 PROCEDURE — 84100 ASSAY OF PHOSPHORUS: CPT | Performed by: INTERNAL MEDICINE

## 2022-12-14 PROCEDURE — 96372 THER/PROPH/DIAG INJ SC/IM: CPT | Mod: 59 | Performed by: INTERNAL MEDICINE

## 2022-12-14 PROCEDURE — 63600175 PHARM REV CODE 636 W HCPCS: Performed by: INTERNAL MEDICINE

## 2022-12-14 PROCEDURE — G0378 HOSPITAL OBSERVATION PER HR: HCPCS

## 2022-12-14 PROCEDURE — 96372 THER/PROPH/DIAG INJ SC/IM: CPT | Performed by: INTERNAL MEDICINE

## 2022-12-14 PROCEDURE — 80048 BASIC METABOLIC PNL TOTAL CA: CPT | Performed by: INTERNAL MEDICINE

## 2022-12-14 RX ADMIN — EZETIMIBE 10 MG: 10 TABLET ORAL at 09:12

## 2022-12-14 RX ADMIN — ESCITALOPRAM OXALATE 10 MG: 10 TABLET ORAL at 09:12

## 2022-12-14 RX ADMIN — ZIPRASIDONE MESYLATE 10 MG: 20 INJECTION, POWDER, LYOPHILIZED, FOR SOLUTION INTRAMUSCULAR at 08:12

## 2022-12-14 RX ADMIN — PANTOPRAZOLE SODIUM 40 MG: 40 TABLET, DELAYED RELEASE ORAL at 05:12

## 2022-12-14 RX ADMIN — ZIPRASIDONE MESYLATE 10 MG: 20 INJECTION, POWDER, LYOPHILIZED, FOR SOLUTION INTRAMUSCULAR at 09:12

## 2022-12-14 RX ADMIN — TUBERCULIN PURIFIED PROTEIN DERIVATIVE 5 UNITS: 5 INJECTION, SOLUTION INTRADERMAL at 05:12

## 2022-12-14 RX ADMIN — ASPIRIN 81 MG: 81 TABLET, COATED ORAL at 09:12

## 2022-12-14 RX ADMIN — ENOXAPARIN SODIUM 40 MG: 40 INJECTION SUBCUTANEOUS at 05:12

## 2022-12-14 RX ADMIN — MIDODRINE HYDROCHLORIDE 2.5 MG: 2.5 TABLET ORAL at 05:12

## 2022-12-14 RX ADMIN — MIDODRINE HYDROCHLORIDE 2.5 MG: 2.5 TABLET ORAL at 12:12

## 2022-12-14 RX ADMIN — MIDODRINE HYDROCHLORIDE 2.5 MG: 2.5 TABLET ORAL at 09:12

## 2022-12-14 RX ADMIN — THERA TABS 1 TABLET: TAB at 09:12

## 2022-12-14 NOTE — PROGRESS NOTES
"[facility] Medicine  Progress Note    Patient Name: Jarred Harrison  MRN: [id number]  Patient Class: OP- Observation   Admission Date: 12/12/2022  Length of Stay: 0 days  Attending Physician: Jami Andrew MD  Primary Care Provider: Jeyson Waldron MD        Subjective:     Principal Problem:Dementia with behavioral disturbance        HPI:  82 year old male with history of Dementia, Encephalopathy, CAD, HTN, GERD presented to ED with daughter stating that her father's dementia has worsened since being recently diagnosed with COVID. She would like for him to be placed into hospice. The patient has declined steadily and is no longer able to care for himself. Discussed with  in ED, who has contacted the Hospice company the daughter has selected and they will be trying to admit this evening or in AM.    In ED: labs reviewed and noted below: mild leukocytosis with minimal normocytic anemia; normal electrolytes and renal function; hepatic function is normal; lactate is normal; procalcitonin is not elevated. Flu is negative. UA: nitrite negative. CXR: NAPD.    Discussed with ED MD: Place in Observation,  consultation in ED, spoke with hospice company of A's choice: "[facility]", who will be admitting patient in AM. Continuing home regimen for chronic illnesses, except increasing Seroquel to bid.      Overview/Hospital Course:  Patient with a history of dementia with behavioral disturbance, COVID-19 (tested positive 12/04/22), hypertension, GERD, sick sinus syndrome who presented due to ongoing encephalopathy.  Patient with previous [facility] admission, 12/4/22 to 12/7/22, for encephalopathy, right facial droop and slurred speech, CVA workup with MRI negative.  During that admission did have behavioral disturbance, attempting to get out of bed, hypoactive delirium, did not respond well to Haldol as per report.  Previous echo with EF of 58%.  Discussed with daughter at " bedside, yes been restless, fidgeting, ongoing contusion since discharge and unable to manage at home, he was brought to the ED and initial plan was home with hospice but now family requesting nursing home placement with hospice.  They were previously planning moving to assisted living facility before this decline.  On presentation was febrile to 102.3, subsequent temperature curve improved, procalcitonin 0.35, UA 2 WBC, blood cultures negative, chest x-ray clear, WBC count 16.  He was admitted to medical floor, monitoring, adjusting medication for hyperactive delirium, case management consulted.      Interval History:  Overall patient appears to be doing better.  Did awaken at around 4:30 a.m. this morning with agitation but subsequently improved.  Daughter at bedside states he had good appetite, ate breakfast and lunch.  He is currently resting.  Afebrile with T-max 98.6°.  On room air.  Labs with WBC 10, hemoglobin 11.5, BUN/creatinine 16/0.9.  Discussed with daughter at bedside.  Discussed with nursing.  Discussed with case management, patient now accepted at memory unit at [facility] with [facility] hospice.    Review of Systems   Unable to perform ROS: Other   Objective:     Vital Signs (Most Recent):  Temp: 97.5 °F (36.4 °C) (12/14/22 0740)  Pulse: 76 (12/14/22 0740)  Resp: 18 (12/14/22 0740)  BP: 129/80 (12/14/22 0740)  SpO2: 97 % (12/14/22 0740) Vital Signs (24h Range):  Temp:  [97.5 °F (36.4 °C)-98.6 °F (37 °C)] 97.5 °F (36.4 °C)  Pulse:  [61-82] 76  Resp:  [17-19] 18  SpO2:  [96 %-98 %] 97 %  BP: (122-145)/(64-83) 129/80     Weight: 57.5 kg (126 lb 12.2 oz)  Body mass index is 19.27 kg/m².    Intake/Output Summary (Last 24 hours) at 12/14/2022 1506  Last data filed at 12/14/2022 0137  Gross per 24 hour   Intake --   Output 1000 ml   Net -1000 ml      Physical Exam  Vitals and nursing note reviewed.   Constitutional:       Comments: Elderly male, lying in bed, sleeping, did not disturb   HENT:       Head: Normocephalic and atraumatic.   Eyes:      Comments: Wearing corrective lenses   Cardiovascular:      Rate and Rhythm: Normal rate and regular rhythm.   Pulmonary:      Comments: On room air  Neurological:      Comments: Sleeping       Significant Labs: CBC:   Recent Labs   Lab 12/12/22  1646 12/14/22  1017   WBC 16.10* 10.16   HGB 11.6* 11.5*   HCT 34.5* 34.3*   * 151     CMP:   Recent Labs   Lab 12/12/22  1646 12/14/22  1017    137   K 4.2 3.9    103   CO2 23 26   * 115*   BUN 22 16   CREATININE 1.1 0.9   CALCIUM 9.2 9.7   PROT 7.2  --    ALBUMIN 4.1  --    BILITOT 1.0  --    ALKPHOS 42*  --    AST 29  --    ALT 21  --    ANIONGAP 9 8     Magnesium:   Recent Labs   Lab 12/14/22  1017   MG 2.1     POCT Glucose: No results for input(s): POCTGLUCOSE in the last 48 hours.    Significant Imaging: I have reviewed all pertinent imaging results/findings within the past 24 hours.      Assessment/Plan:      Active Hospital Problems    Diagnosis    *Dementia with behavioral disturbance    Orthostatic hypotension    Normochromic normocytic anemia    Late onset Alzheimer's dementia with behavioral disturbance    Sick sinus syndrome    H/O heart artery stent    Hearing loss    Gastro-esophageal reflux disease without esophagitis     Plan:  Continue care on medical floor  Continue Geodon, appears to have responded well   Fall and delirium precautions  Regular diet, supplements as able   Intermittent lab checks  Case management following for discharge planning, now planned discharge at memory unit at [place] with hospice   Updated daughter at bedside   Further plan as per clinical course    VTE Risk Mitigation (From admission, onward)         Ordered     enoxaparin injection 40 mg  Daily         12/12/22 2207     IP VTE HIGH RISK PATIENT  Once         12/12/22 2207     Place sequential compression device  Until discontinued         12/12/22 2207                Discharge Planning   GOMEZ:  12/15/2022     Code Status: DNR   Is the patient medically ready for discharge?:     Reason for patient still in hospital (select all that apply): Other (specify) placement  Discharge Plan A: Hospice/home                  Jami Andrew MD  Department of Hospital Medicine   [facility]

## 2022-12-14 NOTE — SUBJECTIVE & OBJECTIVE
Interval History:  Overall patient appears to be doing better.  Did awaken at around 4:30 a.m. this morning with agitation but subsequently improved.  Daughter at bedside states he had good appetite, ate breakfast and lunch.  He is currently resting.  Afebrile with T-max 98.6°.  On room air.  Labs with WBC 10, hemoglobin 11.5, BUN/creatinine 16/0.9.  Discussed with daughter at bedside.  Discussed with nursing.  Discussed with case management, patient now accepted at memory unit at [facility] with [facility] hospice.    Review of Systems   Unable to perform ROS: Other   Objective:     Vital Signs (Most Recent):  Temp: 97.5 °F (36.4 °C) (12/14/22 0740)  Pulse: 76 (12/14/22 0740)  Resp: 18 (12/14/22 0740)  BP: 129/80 (12/14/22 0740)  SpO2: 97 % (12/14/22 0740) Vital Signs (24h Range):  Temp:  [97.5 °F (36.4 °C)-98.6 °F (37 °C)] 97.5 °F (36.4 °C)  Pulse:  [61-82] 76  Resp:  [17-19] 18  SpO2:  [96 %-98 %] 97 %  BP: (122-145)/(64-83) 129/80     Weight: 57.5 kg (126 lb 12.2 oz)  Body mass index is 19.27 kg/m².    Intake/Output Summary (Last 24 hours) at 12/14/2022 1506  Last data filed at 12/14/2022 0137  Gross per 24 hour   Intake --   Output 1000 ml   Net -1000 ml      Physical Exam  Vitals and nursing note reviewed.   Constitutional:       Comments: Elderly male, lying in bed, sleeping, did not disturb   HENT:      Head: Normocephalic and atraumatic.   Eyes:      Comments: Wearing corrective lenses   Cardiovascular:      Rate and Rhythm: Normal rate and regular rhythm.   Pulmonary:      Comments: On room air  Neurological:      Comments: Sleeping       Significant Labs: CBC:   Recent Labs   Lab 12/12/22  1646 12/14/22  1017   WBC 16.10* 10.16   HGB 11.6* 11.5*   HCT 34.5* 34.3*   * 151     CMP:   Recent Labs   Lab 12/12/22  1646 12/14/22  1017    137   K 4.2 3.9    103   CO2 23 26   * 115*   BUN 22 16   CREATININE 1.1 0.9   CALCIUM 9.2 9.7   PROT 7.2  --    ALBUMIN 4.1  --    BILITOT 1.0   --    ALKPHOS 42*  --    AST 29  --    ALT 21  --    ANIONGAP 9 8     Magnesium:   Recent Labs   Lab 12/14/22  1017   MG 2.1     POCT Glucose: No results for input(s): POCTGLUCOSE in the last 48 hours.    Significant Imaging: I have reviewed all pertinent imaging results/findings within the past 24 hours.

## 2022-12-14 NOTE — PLAN OF CARE
JONAH spoke with Muna at [facility] and informed Muna that [facility] does not have senior care beds. Muna suggested sending referrals to [facility] in [place]. CM s/w daughter Taylor at patients bedside. Taylor does not want to place patient in [place] area. JONAH offered additional hospice agencies that may be able to assist family with placement. Taylor grateful for additional resources. Info visit arranged with Tejinder at [facility]. Tejinder assisted patient and daughter in arranging senior care placement at [facility]. Bed available on 12/15. Taylor will let CM know which hospice agency patient will discharge to.    [id number]- JONAH spoke with Taylor whom opted for [facility] hospice. Taylor has made arrangements with [facility]. Patient will move in on 12/15. JONAH spoke with Tejinder from [facility]. Hospice will deliver all DME to [facility] and notify CM when DME is in place. [facility] will admit patient to home hospice when he arrives at [facility] on 12/15.       12/14/22 1237   Discharge Reassessment   Assessment Type Discharge Planning Reassessment   Discharge Plan A Hospice/home   Discharge Plan B Hospice/home   Post-Acute Status   Post-Acute Authorization Hospice   Hospice Status Referrals Sent

## 2022-12-15 VITALS
HEART RATE: 72 BPM | SYSTOLIC BLOOD PRESSURE: 74 MMHG | OXYGEN SATURATION: 99 % | TEMPERATURE: 98 F | BODY MASS INDEX: 19.21 KG/M2 | HEIGHT: 68 IN | WEIGHT: 126.75 LBS | RESPIRATION RATE: 17 BRPM | DIASTOLIC BLOOD PRESSURE: 50 MMHG

## 2022-12-15 PROCEDURE — 25000003 PHARM REV CODE 250: Performed by: INTERNAL MEDICINE

## 2022-12-15 PROCEDURE — G0378 HOSPITAL OBSERVATION PER HR: HCPCS

## 2022-12-15 RX ORDER — ZIPRASIDONE HYDROCHLORIDE 20 MG/1
20 CAPSULE ORAL 2 TIMES DAILY
Status: DISCONTINUED | OUTPATIENT
Start: 2022-12-15 | End: 2022-12-15 | Stop reason: HOSPADM

## 2022-12-15 RX ORDER — ZIPRASIDONE HYDROCHLORIDE 20 MG/1
20 CAPSULE ORAL 2 TIMES DAILY
Qty: 60 CAPSULE | Refills: 0 | Status: SHIPPED | OUTPATIENT
Start: 2022-12-15 | End: 2022-12-15 | Stop reason: SDUPTHER

## 2022-12-15 RX ORDER — ZIPRASIDONE HYDROCHLORIDE 20 MG/1
20 CAPSULE ORAL 2 TIMES DAILY
Qty: 60 CAPSULE | Refills: 0 | Status: SHIPPED | OUTPATIENT
Start: 2022-12-15 | End: 2023-01-14

## 2022-12-15 RX ADMIN — THERA TABS 1 TABLET: TAB at 09:12

## 2022-12-15 RX ADMIN — EZETIMIBE 10 MG: 10 TABLET ORAL at 09:12

## 2022-12-15 RX ADMIN — PANTOPRAZOLE SODIUM 40 MG: 40 TABLET, DELAYED RELEASE ORAL at 06:12

## 2022-12-15 RX ADMIN — ASPIRIN 81 MG: 81 TABLET, COATED ORAL at 09:12

## 2022-12-15 RX ADMIN — ESCITALOPRAM OXALATE 10 MG: 10 TABLET ORAL at 09:12

## 2022-12-15 RX ADMIN — MIDODRINE HYDROCHLORIDE 2.5 MG: 2.5 TABLET ORAL at 09:12

## 2022-12-15 RX ADMIN — ZIPRASIDONE HCL 20 MG: 20 CAPSULE ORAL at 10:12

## 2022-12-15 NOTE — PLAN OF CARE
Problem: Infection  Goal: Absence of Infection Signs and Symptoms  Outcome: Ongoing, Progressing     Problem: Adult Inpatient Plan of Care  Goal: Plan of Care Review  Outcome: Ongoing, Progressing  Goal: Patient-Specific Goal (Individualized)  Outcome: Ongoing, Progressing  Goal: Absence of Hospital-Acquired Illness or Injury  Outcome: Ongoing, Progressing  Goal: Optimal Comfort and Wellbeing  Outcome: Ongoing, Progressing  Goal: Readiness for Transition of Care  Outcome: Ongoing, Progressing     Problem: Skin Injury Risk Increased  Goal: Skin Health and Integrity  Outcome: Ongoing, Progressing     Problem: Impaired Wound Healing  Goal: Optimal Wound Healing  Outcome: Ongoing, Progressing

## 2022-12-15 NOTE — PLAN OF CARE
Patient cleared for discharge from case management standpoint.    Patient is discharging home with [facility]. Daughter, radha, is planning to move patient to [facility] when apartment is available either today or tomorrow.    Chart and discharge orders reviewed.  Patient discharged home with [facility] no further case management needs.         12/15/22 1126   Final Note   Assessment Type Final Discharge Note   Anticipated Discharge Disposition HospiceHome   Post-Acute Status   Post-Acute Authorization Hospice   Hospice Status Set-up Complete/Auth obtained   Discharge Delays None known at this time

## 2022-12-15 NOTE — DISCHARGE SUMMARY
"[facility] Medicine  Discharge Summary      Patient Name: Jarred Harrison  MRN: [id number]  ALLYSON: [id number]  Patient Class: OP- Observation  Admission Date: 12/12/2022  Hospital Length of Stay: 0 days  Discharge Date and Time: 12/15/2022 12:09 PM  Attending Physician: No att. providers found   Discharging Provider: Jami Andrew MD  Primary Care Provider: Jeyson Waldron MD    Primary Care Team: Networked reference to record PCT     HPI:   82 year old male with history of Dementia, Encephalopathy, CAD, HTN, GERD presented to ED with daughter stating that her father's dementia has worsened since being recently diagnosed with COVID. She would like for him to be placed into hospice. The patient has declined steadily and is no longer able to care for himself. Discussed with  in ED, who has contacted the Hospice company the daughter has selected and they will be trying to admit this evening or in AM.    In ED: labs reviewed and noted below: mild leukocytosis with minimal normocytic anemia; normal electrolytes and renal function; hepatic function is normal; lactate is normal; procalcitonin is not elevated. Flu is negative. UA: nitrite negative. CXR: NAPD.    Discussed with ED MD: Place in Observation,  consultation in ED, spoke with hospice company of A's choice: "[facility]", who will be admitting patient in AM. Continuing home regimen for chronic illnesses, except increasing Seroquel to bid.      * No surgery found *      Hospital Course:   Patient with a history of dementia with behavioral disturbance with recent worsening, wife passed away about 3 months ago, COVID-19 (tested positive 12/04/22), hypertension, GERD, hypotension, on midodrine, sick sinus syndrome who presented due to ongoing encephalopathy.  Patient with previous [facility] admission, 12/4/22 to 12/7/22, for encephalopathy, right facial droop and slurred speech, CVA workup with MRI negative.  During that " admission did have behavioral disturbance, attempting to get out of bed, hyperactive delirium, did not respond well to Haldol as per report.  Previous echo with EF of 58%.  Discussed with daughter at bedside, continues to be restless, fidgeting, ongoing confusion since discharge and unable to manage at home, he was brought to the ED and initial plan was home with hospice but now family requesting facility placement with hospice.  They were previously planning moving to assisted living facility before this decline.  On presentation was febrile to 102.3, subsequent temperature curve improved, procalcitonin 0.35, UA 2 WBC, blood cultures negative, chest x-ray clear, WBC count 16.  He was admitted to medical floor, monitoring, started on Geodon for hyperactive delirium with improvement.  No evidence of ongoing infection, remained afebrile, WBC count normalized, off antimicrobial therapy.  On 12/15 no complaints, doing better, plan is for discharge to [place] assisted living facility/memory unit, with [facility].  Discussed with son-in-law at bedside on day of discharge.  Discharge plan was reviewed, no questions or concerns.  Discussed with nursing and case management on day of discharge.      Discharge examination   Sitting in bed, hard of hearing, alert, on room air, regular heart rhythm       Goals of Care Treatment Preferences:  Code Status: DNR      Consults:   Consults (From admission, onward)        Status Ordering Provider     Inpatient consult to Social Work/Case Management  Once        Provider:  (Not yet assigned)    Acknowledged ANURADHA SY     Inpatient consult to Hospitalist  Once        Provider:  Anuradha Sy MD    Acknowledged PATRICK BENNETT          No new Assessment & Plan notes have been filed under this hospital service since the last note was generated.  Service: Hospital Medicine    Final Active Diagnoses:    Diagnosis Date Noted POA    PRINCIPAL PROBLEM:  Dementia with  behavioral disturbance [F03.918] 12/04/2022 Yes    Orthostatic hypotension [I95.1] 07/02/2022 Yes    Normochromic normocytic anemia [D64.9] 12/27/2021 Yes    Late onset Alzheimer's dementia with behavioral disturbance [G30.1, F02.818] 12/27/2021 Yes    Sick sinus syndrome [I49.5] 07/19/2020 Yes    H/O heart artery stent [Z95.5] 07/03/2017 Not Applicable    Hearing loss [H91.90] 11/26/2014 Yes    Gastro-esophageal reflux disease without esophagitis [K21.9] 11/26/2014 Yes      Problems Resolved During this Admission:    Diagnosis Date Noted Date Resolved POA    Fever [R50.9] 12/13/2022 12/14/2022 Yes    Leucocytosis [D72.829] 12/13/2022 12/14/2022 Yes       Discharged Condition: good    Disposition: Hospice/Home    Follow Up:   Follow-up Information     Jeyson Waldron MD. Schedule an appointment as soon as possible for a visit.    Specialties: Family Medicine, Home Health Services, Hospice Services  Why: follow up  Contact information:  [address]  [address]  [place] [address]  [phone]                       Patient Instructions:      Diet Cardiac     Notify your health care provider if you experience any of the following:  temperature >100.4     Notify your health care provider if you experience any of the following:  difficulty breathing or increased cough     Activity as tolerated       Significant Diagnostic Studies: Labs:   BMP:   Recent Labs   Lab 12/14/22  1017   *      K 3.9      CO2 26   BUN 16   CREATININE 0.9   CALCIUM 9.7   MG 2.1   , CMP   Recent Labs   Lab 12/14/22  1017      K 3.9      CO2 26   *   BUN 16   CREATININE 0.9   CALCIUM 9.7   ANIONGAP 8   , CBC   Recent Labs   Lab 12/14/22  1017   WBC 10.16   HGB 11.5*   HCT 34.3*      , INR   Lab Results   Component Value Date    INR 1.2 12/04/2022   , Lipid Panel   Lab Results   Component Value Date    CHOL 155 12/04/2022    HDL 55 12/04/2022    LDLCALC 91.0  12/04/2022    TRIG 45 12/04/2022    CHOLHDL 35.5 12/04/2022   , Troponin No results for input(s): TROPONINI in the last 168 hours. and A1C:   Recent Labs   Lab 12/05/22  0533   HGBA1C 5.7       Pending Diagnostic Studies:     None       MRI Brain Without Contrast    Result Date: 12/4/2022  MRI of the brain Clinical history is TIA. COMPARISON: Head CT at 8:39 AM Multiplanar images of the brain were obtained. The ventricles and sulci are mildly prominent commensurate with the patient's age. There is no abnormality on the diffusion-weighted images to suggest acute infarct. There is mild increased FLAIR and T2 signal in the periventricular white matter compatible with small vessel disease. There is no hemorrhage, mass or midline shift. There are no extra-axial fluid collections. There are flow voids within the cavernous portions of the internal carotid arteries and basilar artery indicating patency. The corpus callosum, cerebellar tonsils, midline structures are in orbits are normal. IMPRESSION: Generalized relaxed with mild periventricular small vessel disease No acute intracranial process Electronically signed by:  Ally Sheriff MD  12/4/2022 3:31 PM CST Workstation: [id number]    X-Ray Chest AP Portable    Result Date: 12/12/2022  XR CHEST 1 VIEW CLINICAL HISTORY: 82 years Male Sepsis COMPARISON: 12/4/2022 FINDINGS: Cardiomediastinal silhouette is within normal limits. Lungs are normally expanded with no airspace consolidation. No pleural effusion or pneumothorax. No acute osseous abnormality. IMPRESSION: No acute pulmonary process. Electronically signed by:  Ally Sheriff MD  12/12/2022 6:45 PM CST Workstation: [id number]    X-Ray Chest AP Portable    Result Date: 12/4/2022  XR CHEST 1 VIEW CLINICAL HISTORY: 82 years Male ams COMPARISON: 11/27/2017 FINDINGS: Cardiomediastinal silhouette is within normal limits. Lungs are normally expanded with no airspace consolidation. No pleural effusion or pneumothorax.  No acute osseous abnormality. IMPRESSION: No acute pulmonary process. Electronically signed by:  Ally Sheriff MD  12/4/2022 3:16 PM CST Workstation: [id number]    CT HEAD FOR STROKE    Result Date: 12/4/2022  CMS MANDATED QUALITY DATA-CT RADIATION DOSE-436 All CT scans at this facility dose modulation, iterative reconstruction, and or weight-based dosing when appropriate to reduce radiation dose to as low as reasonably achievable. HISTORY: Neuro deficit, acute, stroke suspected FINDINGS: Comparison to head CT of 12/20/2015. There is no acute intracranial hemorrhage, with no mass effect or abnormal extra-axial fluid. Scattered areas of nonspecific hypoattenuation involve the subcortical and deep periventricular white matter, with gray-white differentiation maintained. There is stable generalized prominence of the cortical sulci and ventricles. The cerebellum and brainstem are unremarkable. There are carotid siphon vascular calcifications. The visualized paranasal sinuses and mastoid air cells are clear. There is no acute osseous abnormality. IMPRESSION: 1. No acute intracranial hemorrhage, mass effect, or loss of gray-white differentiation. 2. Chronic microvascular ischemic changes and generalized cerebral atrophy. Electronically signed by:  Judson Ty MD  12/4/2022 9:06 AM CST Workstation: [id number]    Echo Saline Bubble? Yes    Result Date: 12/5/2022  · There is no evidence of intracardiac shunting. · The left ventricle is normal in size with mild concentric hypertrophy and normal systolic function. · The estimated ejection fraction is 58%. · Normal left ventricular diastolic function. · Normal right ventricular size with normal right ventricular systolic function. · Mild mitral regurgitation. · Normal central venous pressure (3 mmHg).      CTA Head and Neck (xpd)    Result Date: 12/4/2022  CMS MANDATED QUALITY DATA-CT RADIATION DOSE-436, CAROTID-195 All CT scans at this facility use dose modulation,  iterative reconstruction, and or weight based dosing when appropriate, to reduce radiation dose to as low as reasonably achievable. NASCET criteria were utilized for evaluation of carotid arterial stenosis. HISTORY: Neuro deficit, acute, stroke suspected FINDINGS: Thin axial imaging through the head and neck was performed with 100 mL Omnipaque 350 IV contrast, with sagittal and coronal reformatted images and MIP reconstructions performed, and images stored in the patient's permanent electronic medical record. Comparison to prior exams. CTA HEAD: The distal cervical, petrous, cavernous and supraclinoid segments of the internal carotid arteries are widely patent, with mild calcified plaque of the carotid siphons. The distal intracranial segments of the vertebral arteries and basilar artery are widely patent. The bilateral anterior, middle and posterior cerebral arteries are widely patent and taper appropriately, with no intracranial aneurysm or vascular malformation. The anterior communicating artery is patent. The visualized dural venous sinuses enhance normally. There is no enhancing intracranial mass. CTA NECK:  Scattered calcified plaque involves the aortic arch, with the arch and arch vessel origins widely patent. Both visualized subclavian arteries are widely patent, with the common carotid arteries widely patent. There is mild calcified and soft plaque of the carotid bulbs, with the internal carotid arteries widely patent through the level of the skull base. The external carotid arteries and branches are patent. The vertebral arteries arise normally from the subclavian arteries, with the right vertebral artery dominant. There is no arterial dissection or aneurysm. The superior mediastinum and cervical soft tissues enhance normally. The visualized upper lungs show apical fibronodular scarring and are otherwise clear. There are no acute fractures or destructive osseous lesions, with intervertebral disc space  narrowing in the spine. IMPRESSION: 1. Mild atheromatous plaque of the carotid bulbs, with widely patent carotid and vertebral arteries. 2. No significant abnormality of the intracranial arterial vasculature. Electronically signed by:  Judson Ty MD  12/4/2022 9:11 AM [facility] Workstation: [id number]    Medications:  Reconciled Home Medications:      Medication List      START taking these medications    ziprasidone 20 MG Cap  Commonly known as: GEODON  Take 1 capsule (20 mg total) by mouth 2 (two) times daily.        CONTINUE taking these medications    aspirin 81 MG EC tablet  Commonly known as: ECOTRIN  Take 81 mg by mouth once daily.     EScitalopram oxalate 10 MG tablet  Commonly known as: LEXAPRO  Take 1 tablet (10 mg total) by mouth once daily.     ezetimibe 10 mg tablet  Commonly known as: ZETIA  Take 1 tablet (10 mg total) by mouth once daily.     latanoprost 0.005 % ophthalmic solution  Place 1 drop into both eyes every evening.     midodrine 2.5 MG Tab  Commonly known as: PROAMATINE  Take 1 tablet (2.5 mg total) by mouth 3 (three) times daily with meals.     multivitamin capsule  Take 1 capsule by mouth once daily.     nitroGLYCERIN 0.4 MG SL tablet  Commonly known as: NITROSTAT  Place 1 tablet (0.4 mg total) under the tongue every 5 (five) minutes as needed for Chest pain.     pantoprazole 40 MG tablet  Commonly known as: PROTONIX  Take 1 tablet (40 mg total) by mouth once daily.        STOP taking these medications    fludrocortisone 0.1 mg Tab  Commonly known as: FLORINEF     QUEtiapine 25 MG Tab  Commonly known as: SEROQUEL            Indwelling Lines/Drains at time of discharge:   Lines/Drains/Airways     None                 Time spent on the discharge of patient: 28 minutes         Jami Andrew MD  Department of Hospital Medicine  [facility]

## 2022-12-17 LAB
BACTERIA BLD CULT: NORMAL
BACTERIA BLD CULT: NORMAL

## 2023-01-01 ENCOUNTER — HOSPITAL ENCOUNTER (EMERGENCY)
Facility: HOSPITAL | Age: 83
Discharge: PSYCHIATRIC OR SUBSTANCE USE FACILITY | End: 2023-01-01
Attending: EMERGENCY MEDICINE
Payer: MEDICARE

## 2023-01-01 VITALS
HEART RATE: 86 BPM | WEIGHT: 126 LBS | DIASTOLIC BLOOD PRESSURE: 62 MMHG | RESPIRATION RATE: 18 BRPM | SYSTOLIC BLOOD PRESSURE: 123 MMHG | OXYGEN SATURATION: 98 % | BODY MASS INDEX: 19.16 KG/M2 | TEMPERATURE: 98 F

## 2023-01-01 DIAGNOSIS — F23 ACUTE PSYCHOSIS: Primary | ICD-10-CM

## 2023-01-01 LAB
ALBUMIN SERPL BCP-MCNC: 3.8 G/DL (ref 3.5–5.2)
ALP SERPL-CCNC: 69 U/L (ref 55–135)
ALT SERPL W/O P-5'-P-CCNC: 15 U/L (ref 10–44)
AMPHET+METHAMPHET UR QL: NEGATIVE
ANION GAP SERPL CALC-SCNC: 9 MMOL/L (ref 8–16)
APAP SERPL-MCNC: <10 UG/ML (ref 10–20)
AST SERPL-CCNC: 36 U/L (ref 10–40)
BACTERIA #/AREA URNS HPF: NEGATIVE /HPF
BARBITURATES UR QL SCN>200 NG/ML: NEGATIVE
BASOPHILS # BLD AUTO: 0.03 K/UL (ref 0–0.2)
BASOPHILS NFR BLD: 0.4 % (ref 0–1.9)
BENZODIAZ UR QL SCN>200 NG/ML: ABNORMAL
BILIRUB SERPL-MCNC: 1.4 MG/DL (ref 0.1–1)
BILIRUB UR QL STRIP: NEGATIVE
BUN SERPL-MCNC: 16 MG/DL (ref 8–23)
BZE UR QL SCN: NEGATIVE
CALCIUM SERPL-MCNC: 9.7 MG/DL (ref 8.7–10.5)
CANNABINOIDS UR QL SCN: NEGATIVE
CHLORIDE SERPL-SCNC: 105 MMOL/L (ref 95–110)
CLARITY UR: CLEAR
CO2 SERPL-SCNC: 25 MMOL/L (ref 23–29)
COLOR UR: YELLOW
CREAT SERPL-MCNC: 0.9 MG/DL (ref 0.5–1.4)
CREAT UR-MCNC: 87 MG/DL (ref 23–375)
DIFFERENTIAL METHOD: ABNORMAL
EOSINOPHIL # BLD AUTO: 0.2 K/UL (ref 0–0.5)
EOSINOPHIL NFR BLD: 3.2 % (ref 0–8)
ERYTHROCYTE [DISTWIDTH] IN BLOOD BY AUTOMATED COUNT: 13.2 % (ref 11.5–14.5)
EST. GFR  (NO RACE VARIABLE): >60 ML/MIN/1.73 M^2
ETHANOL SERPL-MCNC: <5 MG/DL
GLUCOSE SERPL-MCNC: 82 MG/DL (ref 70–110)
GLUCOSE UR QL STRIP: NEGATIVE
HCT VFR BLD AUTO: 32.5 % (ref 40–54)
HGB BLD-MCNC: 10.9 G/DL (ref 14–18)
HGB UR QL STRIP: ABNORMAL
HYALINE CASTS #/AREA URNS LPF: 3 /LPF
IMM GRANULOCYTES # BLD AUTO: 0.01 K/UL (ref 0–0.04)
IMM GRANULOCYTES NFR BLD AUTO: 0.1 % (ref 0–0.5)
KETONES UR QL STRIP: ABNORMAL
LEUKOCYTE ESTERASE UR QL STRIP: NEGATIVE
LYMPHOCYTES # BLD AUTO: 1.3 K/UL (ref 1–4.8)
LYMPHOCYTES NFR BLD: 17.5 % (ref 18–48)
MCH RBC QN AUTO: 32.6 PG (ref 27–31)
MCHC RBC AUTO-ENTMCNC: 33.5 G/DL (ref 32–36)
MCV RBC AUTO: 97 FL (ref 82–98)
MICROSCOPIC COMMENT: ABNORMAL
MONOCYTES # BLD AUTO: 0.8 K/UL (ref 0.3–1)
MONOCYTES NFR BLD: 10.9 % (ref 4–15)
NEUTROPHILS # BLD AUTO: 5.1 K/UL (ref 1.8–7.7)
NEUTROPHILS NFR BLD: 67.9 % (ref 38–73)
NITRITE UR QL STRIP: NEGATIVE
NRBC BLD-RTO: 0 /100 WBC
OPIATES UR QL SCN: ABNORMAL
PCP UR QL SCN>25 NG/ML: NEGATIVE
PH UR STRIP: 6 [PH] (ref 5–8)
PLATELET # BLD AUTO: 173 K/UL (ref 150–450)
PMV BLD AUTO: 10.1 FL (ref 9.2–12.9)
POTASSIUM SERPL-SCNC: 3.4 MMOL/L (ref 3.5–5.1)
PROT SERPL-MCNC: 6.7 G/DL (ref 6–8.4)
PROT UR QL STRIP: ABNORMAL
RBC # BLD AUTO: 3.34 M/UL (ref 4.6–6.2)
RBC #/AREA URNS HPF: 6 /HPF (ref 0–4)
SALICYLATES SERPL-MCNC: <4 MG/DL (ref 15–30)
SARS-COV-2 RDRP RESP QL NAA+PROBE: NEGATIVE
SODIUM SERPL-SCNC: 139 MMOL/L (ref 136–145)
SP GR UR STRIP: 1.01 (ref 1–1.03)
SQUAMOUS #/AREA URNS HPF: 4 /HPF
TOXICOLOGY INFORMATION: ABNORMAL
TSH SERPL DL<=0.005 MIU/L-ACNC: 2.3 UIU/ML (ref 0.34–5.6)
URN SPEC COLLECT METH UR: ABNORMAL
UROBILINOGEN UR STRIP-ACNC: NEGATIVE EU/DL
WBC # BLD AUTO: 7.49 K/UL (ref 3.9–12.7)
WBC #/AREA URNS HPF: 3 /HPF (ref 0–5)

## 2023-01-01 PROCEDURE — 80053 COMPREHEN METABOLIC PANEL: CPT | Performed by: EMERGENCY MEDICINE

## 2023-01-01 PROCEDURE — 80179 DRUG ASSAY SALICYLATE: CPT | Performed by: EMERGENCY MEDICINE

## 2023-01-01 PROCEDURE — 84443 ASSAY THYROID STIM HORMONE: CPT | Performed by: EMERGENCY MEDICINE

## 2023-01-01 PROCEDURE — 80143 DRUG ASSAY ACETAMINOPHEN: CPT | Performed by: EMERGENCY MEDICINE

## 2023-01-01 PROCEDURE — 80307 DRUG TEST PRSMV CHEM ANLYZR: CPT | Performed by: EMERGENCY MEDICINE

## 2023-01-01 PROCEDURE — U0002 COVID-19 LAB TEST NON-CDC: HCPCS | Performed by: EMERGENCY MEDICINE

## 2023-01-01 PROCEDURE — 85025 COMPLETE CBC W/AUTO DIFF WBC: CPT | Performed by: EMERGENCY MEDICINE

## 2023-01-01 PROCEDURE — 81001 URINALYSIS AUTO W/SCOPE: CPT | Mod: 59 | Performed by: EMERGENCY MEDICINE

## 2023-01-01 PROCEDURE — 25000003 PHARM REV CODE 250: Performed by: EMERGENCY MEDICINE

## 2023-01-01 PROCEDURE — 82077 ASSAY SPEC XCP UR&BREATH IA: CPT | Mod: XB | Performed by: EMERGENCY MEDICINE

## 2023-01-01 PROCEDURE — 99285 EMERGENCY DEPT VISIT HI MDM: CPT

## 2023-01-01 RX ORDER — QUETIAPINE FUMARATE 25 MG/1
50 TABLET, FILM COATED ORAL
Status: COMPLETED | OUTPATIENT
Start: 2023-01-01 | End: 2023-01-01

## 2023-01-01 RX ORDER — OLANZAPINE 5 MG/1
10 TABLET, ORALLY DISINTEGRATING ORAL
Status: COMPLETED | OUTPATIENT
Start: 2023-01-01 | End: 2023-01-01

## 2023-01-01 RX ADMIN — OLANZAPINE 10 MG: 5 TABLET, ORALLY DISINTEGRATING ORAL at 01:01

## 2023-01-01 RX ADMIN — QUETIAPINE 50 MG: 25 TABLET ORAL at 03:01

## 2023-01-01 NOTE — ED PROVIDER NOTES
Encounter Date: 1/1/2023       History     Chief Complaint   Patient presents with    Mental Health Problem     Pt sent to er for mental health eval. Ems states pt increased agitation this am. + hx Alzheimer.    Altered Mental Status     Per staff at memory care facility     Patient presents for mental health evaluation after increasing agitation and behavioral outburst at [facility].  Patient has history of dementia.  Patient is currently  is a DNR.  At the worst symptoms are mild-to-moderate.  Further history is not possible as patient has dementia    Review of patient's allergies indicates:   Allergen Reactions    Hydrocodone      Past Medical History:   Diagnosis Date    AI (aortic insufficiency)     Coronary artery disease     GERD (gastroesophageal reflux disease)     Hyperlipidemia     Hypertension     LBBB (left bundle branch block)     Squamous cell carcinoma in situ (SCCIS) of scalp     SSS (sick sinus syndrome)      Past Surgical History:   Procedure Laterality Date    CARDIAC CATHETERIZATION      CATARACT EXTRACTION      CORONARY ANGIOPLASTY  08/29/82015    CORONARY STENT PLACEMENT  2015    Dr Parr    EPIDURAL STEROID INJECTION N/A 7/29/2022    Procedure: Injection, Steroid, Epidural;  Surgeon: Janes Woodson MD;  Location: [facility] OR;  Service: Pain Management;  Laterality: N/A;  L5-s1     FOOT SURGERY Right     KNEE SURGERY Left     right elbow       No family history on file.  Social History     Tobacco Use    Smoking status: Never    Smokeless tobacco: Never   Substance Use Topics    Alcohol use: Not Currently     Review of Systems   Unable to perform ROS: Dementia     Physical Exam     Initial Vitals [01/01/23 1040]   BP Pulse Resp Temp SpO2   (!) 147/67 72 16 98.3 °F (36.8 °C) 99 %      MAP       --         Physical Exam    Nursing note and vitals reviewed.  Constitutional: He is not diaphoretic. No distress.   Weak, frail, dementia, confused   HENT:   Head: Normocephalic and atraumatic.    Eyes: EOM are normal.   Neck: Neck supple.   Normal range of motion.  Cardiovascular:  Intact distal pulses.           Pulmonary/Chest: No respiratory distress.   Musculoskeletal:      Cervical back: Normal range of motion and neck supple.     Neurological: He is alert.   Skin: Skin is warm and dry.   Psychiatric: His affect is labile. His speech is tangential. He is agitated. Thought content is paranoid.       ED Course   Procedures  Labs Reviewed   CBC W/ AUTO DIFFERENTIAL - Abnormal; Notable for the following components:       Result Value    RBC 3.34 (*)     Hemoglobin 10.9 (*)     Hematocrit 32.5 (*)     MCH 32.6 (*)     Lymph % 17.5 (*)     All other components within normal limits   COMPREHENSIVE METABOLIC PANEL - Abnormal; Notable for the following components:    Potassium 3.4 (*)     Total Bilirubin 1.4 (*)     All other components within normal limits   URINALYSIS, REFLEX TO URINE CULTURE - Abnormal; Notable for the following components:    Protein, UA Trace (*)     Ketones, UA 1+ (*)     Occult Blood UA 1+ (*)     All other components within normal limits    Narrative:     Specimen Source->Urine   SALICYLATE LEVEL - Abnormal; Notable for the following components:    Salicylate Lvl <4.0 (*)     All other components within normal limits   URINALYSIS MICROSCOPIC - Abnormal; Notable for the following components:    RBC, UA 6 (*)     Hyaline Casts, UA 3 (*)     All other components within normal limits    Narrative:     Specimen Source->Urine   TSH   ALCOHOL,MEDICAL (ETHANOL)   ACETAMINOPHEN LEVEL   SARS-COV-2 RNA AMPLIFICATION, QUAL   DRUG SCREEN PANEL, URINE EMERGENCY          Imaging Results    None          Medications   QUEtiapine tablet 50 mg (has no administration in time range)   OLANZapine zydis disintegrating tablet 10 mg (10 mg Oral Given 1/1/23 1340)     Medical Decision Making:   Initial Assessment:   Patient appears demented and confused  Differential Diagnosis:   Considerations include but are  not limited to acute kidney injury, dehydration  Clinical Tests:   Lab Tests: Ordered and Reviewed  Radiological Study: Ordered and Reviewed  Medical Tests: Reviewed and Ordered  ED Management:  In the emergency department patient has no evidence of any acute medical abnormality.  He was given Zyprexa and Seroquel to help with agitation.  Physician emergency certificate completed for geriatric psych evaluation and treatment.      MDM    Patient presents for emergent evaluation of acute psychosis that poses a threat to life and/or bodily function.    In the ED patient found to have acute psychosis.    I ordered labs and personally reviewed them.  Labs significant for normal.      Patient was managed in the ED with oral Zyprexa and oral Seroquel  The response to treatment was no change.                    Medically cleared for psychiatry placement: 1/1/2023  2:40 PM         Clinical Impression:   Final diagnoses:  [F23] Acute psychosis (Primary)        ED Disposition Condition    Transfer to Psych Facility Stable          ED Prescriptions    None       Follow-up Information    None          Prosper Serna MD  01/01/23 3311

## 2023-01-01 NOTE — ED NOTES
[facility] notified that patient requires ambulance transport due to the patient's mental state.

## 2023-01-02 NOTE — ED NOTES
Pt is resting comfortably in bed with eyes closed. Chest rise/fall visible. NAD noted. Safety sitter is present with full visualization of the pt.  WCTM pt closely.

## 2023-01-31 ENCOUNTER — PATIENT MESSAGE (OUTPATIENT)
Dept: FAMILY MEDICINE | Facility: CLINIC | Age: 83
End: 2023-01-31

## 2024-03-13 NOTE — PROGRESS NOTES
Patient ID:  Jarred Harrison is a 80 y.o. male who presents for follow-up of Coronary Artery Disease and Hyperlipidemia      He is under significant amount of stress.  His wife had knee surgery and cannot do anything.  He has to change her clothes and take care of her.  He lives in car drive any selling the house because his wife cannot go the stairs.  They are moving to a condominium.  He has been very anxious he has lost significant amount of weight his appetite is gone.  He denies any chest pains any shortness of breath.      Past Medical History:   Diagnosis Date    AI (aortic insufficiency)     Coronary artery disease     GERD (gastroesophageal reflux disease)     Hyperlipidemia     LBBB (left bundle branch block)         Past Surgical History:   Procedure Laterality Date    CARDIAC CATHETERIZATION      CATARACT EXTRACTION      CORONARY ANGIOPLASTY  08/29/82015    FOOT SURGERY Right     INSERT / REPLACE / REMOVE PACEMAKER      KNEE SURGERY Left     right elbow            Current Outpatient Medications   Medication Instructions    ALPRAZolam (XANAX) 0.25 mg, Oral, Nightly PRN    aspirin (ECOTRIN) 81 mg, Oral, Daily    atorvastatin (LIPITOR) 40 mg, Oral, Daily    cetirizine (ZYRTEC) 10 MG tablet TK 1 T PO D PRF ALLERGIES/SINUS COG    coconut oil 1,000 mg Cap Oral    coenzyme Q10 (CO Q-10) 100 mg, Oral, Daily    escitalopram oxalate (LEXAPRO) 5 mg, Oral, Daily    gluc tellez/chondro tellez A/vit C/Mn (GLUCOSAMINE 1500 COMPLEX ORAL) Glucosamine    AND CHONDROITIN 1500, 1200 MG MULTIVITAMIN    glucosamine-chondroitin 500-400 mg tablet 1 tablet, Oral, 3 times daily    latanoprost 0.005 % ophthalmic solution No dose, route, or frequency recorded.    metoprolol succinate (TOPROL-XL) 12.5 mg, Oral, Daily    pantoprazole (PROTONIX) 40 mg, Oral, Daily        Review of patient's allergies indicates:   Allergen Reactions    Hydrocodone         Review of Systems   Constitution: Negative for chills and      3/13/2024         RE: Gill Mendez  [address] [facility] [id number]        Dear Colleague,    Thank you for referring your patient, Gill Mendez, to the [facility]. Please see a copy of my visit note below.      ENDOCRINOLOGY FOLLOW-UP         HISTORY OF PRESENT ILLNESS    Gill Mendez is seen in follow-up for the issues outlined below.     1.  Diabetes mellitus.    We transitioned from Trulicity to Mounjaro in 8/2023.  Continues on Mounjaro 12.5 mg weekly: Tolerating without side effects.    Had left hip revision surgery on 12/14/2023: Has recovered well.  Her sensation in the left lower extremity has improved and she is ambulating better.    Her blood sugars have been quite well-controlled and she has not needed to take Lantus for the past week.  Despite being off Lantus, her fingerstick glucose values have been optimal as below.      Current diabetes regimen: Mounjaro 12.5 mg weekly, Jardiance 25 mg daily, metformin extended release 2000 mg daily.    Has had skin reaction to adhesive from CGM in the past.    Using glucometer at home, typically checks glucose 1-2 times per day.  Average glucose in the past 2 weeks has been 110 mg/dL, highest 154 mg/dL.    Has lost an additional 8 pounds in the interim since last visit in 11/2023; at last visit, she had lost 9 pounds since 8/2023.    2.  Nodular goiter.     Follow-up thyroid ultrasound performed on 9/5/2023 showed stable appearing right dominant nodule, measuring 3.9 x 3.2 x 2.5 cm (previously 3.7 x 3 x 1.8 cm), mixed cystic and solid with macrocalcifications.    Pertinent endocrine and related history:  1.  Diabetes mellitus, type 2. Has previously followed with Dr. Quinteros in the [facility].  -Diabetes was diagnosed in 2005.  Has been complicated by diabetic retinopathy, neuropathy and nephropathy.  -Previously on prandial insulin: Found it difficult to take consistently.  Also prescribed Rybelsus in the past: This  "fever.   HENT: Negative for congestion and sore throat.    Cardiovascular: Negative.  Negative for chest pain and leg swelling.   Respiratory: Negative.  Negative for cough and shortness of breath.    Skin: Negative for itching and rash.   Musculoskeletal: Negative.  Negative for back pain and muscle cramps.   Gastrointestinal: Negative for abdominal pain and heartburn.   Neurological: Negative.  Negative for dizziness and weakness.   Psychiatric/Behavioral: The patient is nervous/anxious.         Objective:     Vitals:    10/08/20 1040   BP: 120/60   BP Location: Right arm   Patient Position: Sitting   BP Method: Medium (Manual)   Pulse: (!) 56   Temp: 97.3 °F (36.3 °C)   SpO2: 98%   Weight: 57.6 kg (127 lb)   Height: 5' 7" (1.702 m)       Physical Exam   Constitutional: He is oriented to person, place, and time. He appears well-developed and well-nourished.   HENT:   Head: Normocephalic and atraumatic.   Eyes: Conjunctivae and EOM are normal.   Cardiovascular: Normal rate, regular rhythm and normal heart sounds.   Pulmonary/Chest: Effort normal and breath sounds normal.   Abdominal: Soft. Bowel sounds are normal.   Musculoskeletal: Normal range of motion.   Neurological: He is alert and oriented to person, place, and time.   Skin: Skin is warm and dry.   Nursing note and vitals reviewed.    CMP  Sodium   Date Value Ref Range Status   07/09/2020 143 135 - 146 mmol/L Final     Potassium   Date Value Ref Range Status   07/09/2020 4.5 3.5 - 5.3 mmol/L Final     Chloride   Date Value Ref Range Status   07/09/2020 109 98 - 110 mmol/L Final     CO2   Date Value Ref Range Status   07/09/2020 27 20 - 32 mmol/L Final     Glucose   Date Value Ref Range Status   07/09/2020 97 65 - 99 mg/dL Final     Comment:                   Fasting reference interval          BUN, Bld   Date Value Ref Range Status   07/09/2020 19 7 - 25 mg/dL Final     Creatinine   Date Value Ref Range Status   07/09/2020 0.96 0.70 - 1.11 mg/dL Final     " caused nausea.  Was on glipizide, this was discontinued due to escalating insulin requirement.  We discontinued Actos in 9/2022.  -Previously used freestyle lilia but had reaction to sensor adhesive.  2.  History of foot ulcer; has history of 2 toe amputations.  3. Thyroid nodule. Goiter palpated on exam in 7/2022.  -No  history of excessive head or neck radiation exposure.  Family history is notable for mother who had thyroid nodules removed surgically: No thyroid cancer.  2 sisters also have thyroid nodules, no thyroid cancer.  -Thyroid US performed 7/13/2022 and showed a heterogenous gland with a 3.7 x 3 x 1.8 cm mixed cystic and solid nodule in the right lobe of the thyroid.  -Ultrasound-guided FNA of thyroid nodule on 7/20/2022, with benign cytology findings.    Pertinent Social History: , has 2 sons and a daughter.  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    PAST MEDICAL HISTORY  Past Medical History:   Diagnosis Date     Anxiety      Asthma      Cellulitis      Diabetes mellitus (H)      Essential hypertension     Created by Conversion  Replacement Utility updated for latest IMO load     Gastroparesis      Hyperlipidemia      Hypertension      Other and unspecified hyperlipidemia     Created by Conversion      PONV (postoperative nausea and vomiting)      Shortness of breath      Type II or unspecified type diabetes mellitus without mention of complication, not stated as uncontrolled     Created by Conversion        MEDICATIONS  Current Outpatient Medications   Medication Sig Dispense Refill     albuterol (PROVENTIL HFA;VENTOLIN HFA) 90 mcg/actuation inhaler Inhale 2 puffs into the lungs every 6 hours as needed       amLODIPine (NORVASC) 5 MG tablet Take 1 tablet by mouth daily       ammonium lactate (LAC-HYDRIN) 12 % external lotion Apply topically 2 times daily 225 g 3     atorvastatin (LIPITOR) 40 MG  Comment:     For patients >49 years of age, the reference limit  for Creatinine is approximately 13% higher for people  identified as -American.          Calcium   Date Value Ref Range Status   07/09/2020 9.9 8.6 - 10.3 mg/dL Final     Total Protein   Date Value Ref Range Status   07/09/2020 6.9 6.1 - 8.1 g/dL Final     Albumin   Date Value Ref Range Status   07/09/2020 4.2 3.6 - 5.1 g/dL Final     Total Bilirubin   Date Value Ref Range Status   07/09/2020 1.2 0.2 - 1.2 mg/dL Final     Alkaline Phosphatase   Date Value Ref Range Status   07/09/2020 45 35 - 144 U/L Final     AST   Date Value Ref Range Status   07/09/2020 30 10 - 35 U/L Final     ALT   Date Value Ref Range Status   07/09/2020 22 9 - 46 U/L Final     eGFR if    Date Value Ref Range Status   07/09/2020 85 > OR = 60 mL/min/1.73m2 Final     eGFR if non    Date Value Ref Range Status   07/09/2020 73 > OR = 60 mL/min/1.73m2 Final      BMP  Lab Results   Component Value Date     07/09/2020    K 4.5 07/09/2020     07/09/2020    CO2 27 07/09/2020    BUN 19 07/09/2020    CREATININE 0.96 07/09/2020    CALCIUM 9.9 07/09/2020    ESTGFRAFRICA 85 07/09/2020    EGFRNONAA 73 07/09/2020      BNP  @LABRCNTIP(BNP,BNPTRIAGEBLO)@   Lab Results   Component Value Date    CHOL 117 07/09/2020    CHOL 118 01/06/2020     Lab Results   Component Value Date    HDL 47 07/09/2020    HDL 42 01/06/2020     Lab Results   Component Value Date    LDLCALC 56 07/09/2020    LDLCALC 60 01/06/2020     Lab Results   Component Value Date    TRIG 63 07/09/2020    TRIG 80 01/06/2020     Lab Results   Component Value Date    CHOLHDL 2.5 07/09/2020    CHOLHDL 2.8 01/06/2020      Lab Results   Component Value Date    TSH 3.79 01/06/2020     No results found for: LABA1C, HGBA1C  Lab Results   Component Value Date    WBC 7.8 07/09/2020    HGB 13.0 (L) 07/09/2020    HCT 39.2 07/09/2020    MCV 96.8 07/09/2020     07/09/2020         No results  tablet Take 40 mg by mouth daily       chlorthalidone (HYGROTON) 25 MG tablet Take 1 tablet by mouth daily       citalopram (CELEXA) 20 MG tablet [CITALOPRAM (CELEXA) 20 MG TABLET] Take 20 mg by mouth every morning.   0     empagliflozin (JARDIANCE) 25 MG TABS tablet Take 1 tablet (25 mg) by mouth daily 90 tablet 1     fluticasone (FLOVENT HFA) 110 MCG/ACT inhaler 2 Puffs inhaled each morning. Rinse mouth with water after use to reduce aftertaste and incidence of candidiasis. Do not swallow.       lisinopriL (PRINIVIL,ZESTRIL) 10 MG tablet [LISINOPRIL (PRINIVIL,ZESTRIL) 10 MG TABLET] Take 10 mg by mouth daily.       metFORMIN (GLUCOPHAGE XR) 500 MG 24 hr tablet Take 2,000 mg by mouth daily (with breakfast)       pramipexole (MIRAPEX) 0.25 MG tablet Take 0.125 mg by mouth At Bedtime 0.5 with a 0.125  3     tirzepatide (MOUNJARO) 15 MG/0.5ML pen Inject 15 mg Subcutaneous every 7 days 2 mL 5     traZODone (DESYREL) 50 MG tablet Take 50 mg by mouth at bedtime       blood glucose (NO BRAND SPECIFIED) test strip Use to test blood sugar 2 times daily as directed. 200 strip 3     blood glucose monitoring (NO BRAND SPECIFIED) meter device kit Use to test blood sugar two times daily or as directed. 1 kit 0     magnesium 250 MG tablet Take 1 tablet by mouth daily         Allergies, family, and social history were reviewed and documented as needed in EHR.     REVIEW OF SYSTEMS  A focused ROS was performed, with pertinent positives and negatives as noted in the HPI.      PHYSICAL EXAM  /68 (BP Location: Right arm, Patient Position: Sitting, Cuff Size: Adult Regular)   Pulse 88   Wt 73.5 kg (162 lb)   BMI 26.96 kg/m    Body mass index is 26.96 kg/m .  Constitutional: Vital signs reviewed, as recorded above. Patient is alert, oriented and appears in no acute distress.  Eyes: PER, EOMI, no stare, lid lag, or retraction; no conjunctival injection.  Respiratory: Normal chest wall motion and respiratory effort.  MSK: No  clubbing or cyanosis; normal muscle bulk and tone.  Skin: No lesions on visible skin,  Neurological: Alert and oriented times 3. No tremor.    DATA REVIEW  Each of the following laboratory and/or imaging studies were reviewed.          ASSESSMENT  1.  Diabetes mellitus, type 2.  Transitioned from Trulicity to Mounjaro in 8/2023.  Tolerating Mounjaro without side effect, has been off insulin for the past week with excellent glycemic control.  Her hemoglobin A1c is higher than I would expect, although I wonder how much may be related to anemia and/or a reflection of previous glycemic control.  Would maximize Mounjaro dose and remain off insulin.  Continue metformin and Jardiance without changes.      2.  Diabetes preventive care.  -History of diabetic retinopathy, I reviewed records of eye exam from 5/1/2023 ([facility] [place]): Proliferative diabetic retinopathy, status post PRP and Avastin (continues on Avastin, although needing less frequent treatment); following up every 6 months, anticipating visit on 3/18/2024 (have asked her to update her ophthalmologist about treatment with Mounjaro)  -CKD and microalbuminuria on urine microalbumin screen on 10/16/2023, on lisinopril and Jardiance  -Foot exam performed on 11/29/2023: Footcare reviewed at that visit    3.  Dyslipidemia.  On statin therapy, optimal lipid profile on 8/21/2023.    4.  Goiter, with thyroid nodule.   Asymmetric thyroid, with prominence of the right lobe of the thyroid noted on exam in 7/2022; thyroid ultrasound revealed a right lobe nodule, which was aspirated on 7/20/2022.  Cytology was benign.  Normal TSH on 8/21/2023.  Follow-up thyroid ultrasound in 9/2023 shows no significant change in thyroid nodule.  Continue periodic monitoring.      PLAN  -Remain off Lantus  -Increase Mounjaro to 15 mg weekly  -Continue remainder of medications without changes   -Check blood glucose twice daily, fasting and before evening meal; also check with  found for this or any previous visit.   No results found for this or any previous visit.       Assessment:       Atherosclerotic heart disease of native coronary artery without angina pectoris  Stable on current medical therapy    Hyperlipidemia  Well controlled on current therapy       Plan:       Continue the the current  .  Return to the office in 6 months.  Blood work will be obtained from his primary care physician Dr. Waldron     any unusual symptoms  -Eye exam as scheduled next week--update ophthalmologist on treatment with Mounjaro  -Follow-up in June 2024 as scheduled, with labs before visit  -We will communicate results via [employer], or if needed by phone      Orders Placed This Encounter   Procedures     Basic metabolic panel     Hemoglobin A1c     CBC with platelets       Levy Lake MD   Division of Diabetes, Endocrinology and Metabolism  Department of Medicine          Again, thank you for allowing me to participate in the care of your patient.        Sincerely,        Levy Lake MD
